# Patient Record
Sex: FEMALE | Race: WHITE
[De-identification: names, ages, dates, MRNs, and addresses within clinical notes are randomized per-mention and may not be internally consistent; named-entity substitution may affect disease eponyms.]

---

## 2017-08-29 ENCOUNTER — HOSPITAL ENCOUNTER (EMERGENCY)
Dept: HOSPITAL 22 - ER | Age: 47
Discharge: HOME | End: 2017-08-29
Payer: MEDICAID

## 2017-08-29 VITALS — HEIGHT: 70 IN | BODY MASS INDEX: 25.77 KG/M2 | WEIGHT: 180 LBS

## 2017-08-29 VITALS — DIASTOLIC BLOOD PRESSURE: 105 MMHG | SYSTOLIC BLOOD PRESSURE: 145 MMHG

## 2017-08-29 DIAGNOSIS — Z72.0: ICD-10-CM

## 2017-08-29 DIAGNOSIS — I10: ICD-10-CM

## 2017-08-29 DIAGNOSIS — L02.413: Primary | ICD-10-CM

## 2017-08-29 PROCEDURE — 0H9DXZZ DRAINAGE OF RIGHT LOWER ARM SKIN, EXTERNAL APPROACH: ICD-10-PCS | Performed by: EMERGENCY MEDICINE

## 2017-08-29 NOTE — EMERGENCY ROOM REPORT
History of Present Illness
Time Seen by MD Catherine
Presenting Problem in Triage
Pt arrived:Walked    
Presenting Problem:SWELLING AND REDNESS NOTED TO R POSTERIOR FOREARM. PT REPORTS
AREA NOTED APPROX 1 WEEK AGO, PT STATES WAS TAKING SOME AMOXICILLIN SHE HAD AT 
HOME, AREA WAS IMPROVING, THEN STATES HIT AREA ON A SURFACE 2 DAYS AGO, STATES 
AREA HAS SWELLED MORE, BECOME MORE PAINFUL, ALSO REPORTS N/V PT REPORTS AREA ON 
R ARM IS RESULT OF USING IV DRUGS 
Onset of symptoms date/time:17/ or onset unknown for:MEDICAL HX UNKNOWN
Treatment Prior to Arrival: AMOXICILLIN   PTA Provided by:SELF
 
Sepsis Risk Assessment: 
Temp: 98.9 B/P: 138/91 MAP: 106 Pulse: 86 Resp: 18
Recent fever? N Clinical Suspician of Infection? N 
Mental Status: 1 - Regular (Normal Baseline)   Sepsis Risk:Low Sepsis Risk 
 
Have you (or family members/close friends) recently traveled outside the [place]? N
If Yes, where/when: 
Have you had exposure to infectious disease within the past month? N TB? 
Other?  Specify: 
 
Comment
The patient has an abscess on her RIGHT forearm extensor surface for 5 days. 
Subjective fever. Nausea vomiting. The patient is an IV drug abuser. She has had
prior abscesses which usually come to a head on their own and resolve, but this 
one did not.
ALLERGIES
Coded Allergies:
codeine (17)
naproxen (From NAPROSYN) (12/01/15)
 
Home Medications
Reported Medications
No Known Home Medications
 
 
 
History
 
Medical History
General
   CAD? No
   Angina: No
   MI: No
   Hypertension? Yes
   Hyperlipidemia? No
   CHF? No
   DVT? No
   PE? No
   COPD? No
   Asthma? Yes
   Anemia? No
   GERD? No
   Gastric ulcers? No
   GI Bleed? No
   Hernia? No
   Thyroid Problems? No
   Hypothyroidism? No
   CVA? No
   Seizures? No
   Diabetes? No
   Renal Insuffiency? No
   End Stage Renal Disease? No
   UTI? No
   Stones? No
   BPH? No
   GB Disease: Yes
   Nephritic Syndrome? No
   Asplenia? No
   Hepatitis? Yes
   Sickle Cell Disease? No
   Arthritis? No
   Migraines? No
   Cataracts? No
   Glaucoma? No
   MRSA? No
   HIV? No
   TB? No
   Anxiety? No
   Depression? No
   Cancer? No
   More? Yes
   Additional hx:
IV DRUG USE
Immunization Hx
   DT/Tetanus UNKNOWN
   Flu REFUSES
   Pneumonia REFUSES
Surgical Hx
Previous Surgery?Y   X 1   Tubal Ligation   Gallbladd   
              
            
 
GYN Hx
   LMP N/A
 
Family History
Family Hx
   Diabetes No
   CAD Yes
   Hypertension Yes
   Hyperlipidemia Yes
   Cancer Yes
   TB No
 
Social History
Smoking Hx
   Smoker: Current Every Day Smoker
   Tobacco: Yes
   Type Cigarettes
   Packs/day 1 1/2 - 2 Packs
Alcohol
   Alcohol: No
 
Review of Systems
All Other Systems Reviewed and Negative
Constitutional
fever
Skin
see HPI
 
Physical Exam
Vital Signs
 Vital Signs
 
 
Date Time Temp Pulse Resp B/P Pulse O2 O2 Flow FiO2
 
     Ox Delivery Rate 
 
 1221 98.9 86 18 138/91 96   
 
 
General Appearance normal appearance
Respiratory Status
No: respiratory distress. 
Cardiovascular regular rate/rhythm, normal peripheral pulses
Extremities indurated abscess on the extensor surface of RIGHT forearm at about 
the mid shaft. Approximately 5 cm diameter with surrounding cellulitis 10 cm 
diameter.
Neurologic alert, no motor/sensory deficits
 
Medical Decision Making
 
LABS/Meds/Orders
Pt receiving controlled substance in ED? Yes
Arun was queried for this patient? Yes
Comment
[id number] 
 
0 rxs
Results/Orders
 Current Medication Orders
 
 
  Sig/Giulia Start time  Last
 
Medication Dose Route Stop Time Status Admin
 
Oxycodone/ 1 EACH ONCE ONE  1330 DCr 
 
Acetaminophen  PO  1331  
 
Clindamycin HCl 300 MG ONCE ONE  1300 DC 
 
  PO  1301  
 
Lidocaine/Epinephrine 10 ML ONCE ONE  1300 DC 
 
  SC  1301  
 
Lidocaine/Epinephrine 0 .STK-MED ONE  1259 DC 
 
  .ROUTE   
 
Sodium Chloride 10 ML PRN PRN  1245 AC 
 
  IV  1236  
 
 
 Orders
 
 
Procedure Date/time Status
 
CULTURE, WOUND  1330 Active
 
IV SALINE LOCK  1236 Active
 
CULTURE, BLOOD  1236 Active
 
LACTIC ACID  1236 Active
 
CBC WITH AUTO DIFF  1236 Active
 
CHEM 12 PROFILE  1236 Active
 
 
 
Progress
-
Nurses unable to obtain IV access.
 
Procedures
 
Incision and Drainage
Progress
Incision/Drainage
Performed by: CHIP RAMESH
 
Consent: Verbal consent obtained.
Risks and benefits: risks, benefits and alternatives were discussed
Consent given by: patient
Patient identity confirmed: verbally with patient
Type: abscess
Location: RIGHT forearm
Anesthesia: local infiltration
Local anesthetic: lidocaine 1% with epinephrine
Patient sedated: no
Scalpel size: 11
Incision type: single straight
Complexity: simple
Drainage: purulent plus blood
Drainage amount: Large amount
Wound treatment: probed for loculationsl. wound left open
Packin/4 inch
Culture:  Yes
Patient tolerance: Patient tolerated the procedure well with no immediate 
complications
 
 
 
Departure
 
Departure
Disposition DC Home or Self Care(routine)
Clinical Impression
Primary Impression: Cutaneous abscess
Qualifiers:  Site of cutaneous abscess: extremity Site of cutaneous abscess of 
extremity: upper extremity Laterality: right Qualified Code: L02.413 - Cutaneous
abscess of right upper limb
Condition STABLE
Patient Instructions DI for Incision and Drainage of a Skin Abscess
Additional Instructions
Additional instructions for ABSCESS:
Day one and two:  Remove the bandage and shower the area, leaving the packing in
place.  Gently blot dry.  Apply a bandage.
Day three: Follow-up with primary care physician, clinic, or Urgent Treatment 
Center for packing removal and culture results.
 
Return to the emergency department if increasing pain, swelling, redness, 
redstreaks or fever greater than 101 degrees.
 
 
Prescriptions
Current Visit Scripts
Clindamycin Hcl (Clindamycin 300MG*****) 300 MG PO QID 
     #40 CAP 
 
OXYCODONE HCL/ACETAMINOPHEN (Percocet 5-325 MG Tablet) 1 TAB PO Q6HP PRN pain
     #10 TAB 
 
 
ED Critical Care
   Critical Care No

## 2017-08-29 NOTE — EMERGENCY ROOM REPORT
History of Present Illness
Time Seen by MD Catherine
Presenting Problem in Triage
Pt arrived:Walked    
Presenting Problem:SWELLING AND REDNESS NOTED TO R POSTERIOR FOREARM. PT REPORTS
AREA NOTED APPROX 1 WEEK AGO, PT STATES WAS TAKING SOME AMOXICILLIN SHE HAD AT 
HOME, AREA WAS IMPROVING, THEN STATES HIT AREA ON A SURFACE 2 DAYS AGO, STATES 
AREA HAS SWELLED MORE, BECOME MORE PAINFUL, ALSO REPORTS N/V PT REPORTS AREA ON 
R ARM IS RESULT OF USING IV DRUGS 
Onset of symptoms date/time:17/ or onset unknown for:MEDICAL HX UNKNOWN
Treatment Prior to Arrival: AMOXICILLIN   PTA Provided by:SELF
 
Sepsis Risk Assessment: 
Temp: 98.9 B/P: 138/91 MAP: 106 Pulse: 86 Resp: 18
Recent fever? N Clinical Suspician of Infection? N 
Mental Status: 1 - Regular (Normal Baseline)   Sepsis Risk:Low Sepsis Risk 
 
Have you (or family members/close friends) recently traveled outside the [place]? N
If Yes, where/when: 
Have you had exposure to infectious disease within the past month? N TB? 
Other?  Specify: 
 
Comment
The patient has an abscess on her RIGHT forearm extensor surface for 5 days. 
Subjective fever. Nausea vomiting. The patient is an IV drug abuser. She has had
prior abscesses which usually come to a head on their own and resolve, but this 
one did not.
ALLERGIES
Coded Allergies:
codeine (17)
naproxen (From NAPROSYN) (12/01/15)
 
Home Medications
Reported Medications
No Known Home Medications
 
 
 
History
 
Medical History
General
   CAD? No
   Angina: No
   MI: No
   Hypertension? Yes
   Hyperlipidemia? No
   CHF? No
   DVT? No
   PE? No
   COPD? No
   Asthma? Yes
   Anemia? No
   GERD? No
   Gastric ulcers? No
   GI Bleed? No
   Hernia? No
   Thyroid Problems? No
   Hypothyroidism? No
   CVA? No
   Seizures? No
   Diabetes? No
   Renal Insuffiency? No
   End Stage Renal Disease? No
   UTI? No
   Stones? No
   BPH? No
   GB Disease: Yes
   Nephritic Syndrome? No
   Asplenia? No
   Hepatitis? Yes
   Sickle Cell Disease? No
   Arthritis? No
   Migraines? No
   Cataracts? No
   Glaucoma? No
   MRSA? No
   HIV? No
   TB? No
   Anxiety? No
   Depression? No
   Cancer? No
   More? Yes
   Additional hx:
IV DRUG USE
Immunization Hx
   DT/Tetanus UNKNOWN
   Flu REFUSES
   Pneumonia REFUSES
Surgical Hx
Previous Surgery?Y   X 1   Tubal Ligation   Gallbladd   
              
            
 
GYN Hx
   LMP N/A
 
Family History
Family Hx
   Diabetes No
   CAD Yes
   Hypertension Yes
   Hyperlipidemia Yes
   Cancer Yes
   TB No
 
Social History
Smoking Hx
   Smoker: Current Every Day Smoker
   Tobacco: Yes
   Type Cigarettes
   Packs/day 1 1/2 - 2 Packs
Alcohol
   Alcohol: No
 
Review of Systems
All Other Systems Reviewed and Negative
Constitutional
fever
Skin
see HPI
 
Physical Exam
Vital Signs
 Vital Signs
 
 
Date Time Temp Pulse Resp B/P Pulse O2 O2 Flow FiO2
 
     Ox Delivery Rate 
 
 1221 98.9 86 18 138/91 96   
 
 
General Appearance normal appearance
Respiratory Status
No: respiratory distress. 
Cardiovascular regular rate/rhythm, normal peripheral pulses
Extremities indurated abscess on the extensor surface of RIGHT forearm at about 
the mid shaft. Approximately 5 cm diameter with surrounding cellulitis 10 cm 
diameter.
Neurologic alert, no motor/sensory deficits
 
Medical Decision Making
 
LABS/Meds/Orders
Pt receiving controlled substance in ED? Yes
Arun was queried for this patient? Yes
Comment
[id number] 
 
0 rxs
Results/Orders
 Current Medication Orders
 
 
  Sig/Giulia Start time  Last
 
Medication Dose Route Stop Time Status Admin
 
Oxycodone/ 1 EACH ONCE ONE  1330 DCr 
 
Acetaminophen  PO  1331  
 
Clindamycin HCl 300 MG ONCE ONE  1300 DC 
 
  PO  1301  
 
Lidocaine/Epinephrine 10 ML ONCE ONE  1300 DC 
 
  SC  1301  
 
Lidocaine/Epinephrine 0 .STK-MED ONE  1259 DC 
 
  .ROUTE   
 
Sodium Chloride 10 ML PRN PRN  1245 AC 
 
  IV  1236  
 
 
 Orders
 
 
Procedure Date/time Status
 
CULTURE, WOUND  1330 Active
 
IV SALINE LOCK  1236 Active
 
CULTURE, BLOOD  1236 Active
 
LACTIC ACID  1236 Active
 
CBC WITH AUTO DIFF  1236 Active
 
CHEM 12 PROFILE  1236 Active
 
 
 
Progress
-
Nurses unable to obtain IV access.
 
Procedures
 
Incision and Drainage
Progress
Incision/Drainage
Performed by: CHIP RAMESH
 
Consent: Verbal consent obtained.
Risks and benefits: risks, benefits and alternatives were discussed
Consent given by: patient
Patient identity confirmed: verbally with patient
Type: abscess
Location: RIGHT forearm
Anesthesia: local infiltration
Local anesthetic: lidocaine 1% with epinephrine
Patient sedated: no
Scalpel size: 11
Incision type: single straight
Complexity: simple
Drainage: purulent plus blood
Drainage amount: Large amount
Wound treatment: probed for loculationsl. wound left open
Packin/4 inch
Culture:  Yes
Patient tolerance: Patient tolerated the procedure well with no immediate 
complications
 
 
 
Departure
 
Departure
Disposition DC Home or Self Care(routine)
Clinical Impression
Primary Impression: Cutaneous abscess
Qualifiers:  Site of cutaneous abscess: extremity Site of cutaneous abscess of 
extremity: upper extremity Laterality: right Qualified Code: L02.413 - Cutaneous
abscess of right upper limb
Condition STABLE
Patient Instructions DI for Incision and Drainage of a Skin Abscess
Additional Instructions
Additional instructions for ABSCESS:
Day one and two:  Remove the bandage and shower the area, leaving the packing in
place.  Gently blot dry.  Apply a bandage.
Day three: Follow-up with primary care physician, clinic, or Urgent Treatment 
Center for packing removal and culture results.
 
Return to the emergency department if increasing pain, swelling, redness, 
redstreaks or fever greater than 101 degrees.
 
 
Prescriptions
Current Visit Scripts
Clindamycin Hcl (Clindamycin 300MG*****) 300 MG PO QID 
     #40 CAP 
 
OXYCODONE HCL/ACETAMINOPHEN (Percocet 5-325 MG Tablet) 1 TAB PO Q6HP PRN pain
     #10 TAB 
 
 
ED Critical Care
   Critical Care No

## 2017-09-01 ENCOUNTER — HOSPITAL ENCOUNTER (INPATIENT)
Dept: HOSPITAL 22 - UTC | Age: 47
LOS: 3 days | Discharge: HOME | DRG: 603 | End: 2017-09-04
Attending: FAMILY MEDICINE | Admitting: FAMILY MEDICINE
Payer: MEDICAID

## 2017-09-01 VITALS — DIASTOLIC BLOOD PRESSURE: 104 MMHG | SYSTOLIC BLOOD PRESSURE: 187 MMHG

## 2017-09-01 VITALS — HEIGHT: 70 IN | WEIGHT: 185 LBS | BODY MASS INDEX: 26.48 KG/M2

## 2017-09-01 VITALS — SYSTOLIC BLOOD PRESSURE: 144 MMHG | DIASTOLIC BLOOD PRESSURE: 86 MMHG

## 2017-09-01 DIAGNOSIS — B95.62: ICD-10-CM

## 2017-09-01 DIAGNOSIS — L03.113: ICD-10-CM

## 2017-09-01 DIAGNOSIS — L02.818: Primary | ICD-10-CM

## 2017-09-01 LAB
BASOPHILS # BLD AUTO: 2.5 K/MM3 (ref 0.7–4.5)
BUN: 5 MG/DL (ref 7–18)
EOSINOPHIL NFR BLD AUTO: 31.7 % (ref 10–50)
GFR SERPLBLD BASED ON 1.73 SQ M-ARVRAT: 90 ML/MIN (ref 59–?)
HCT VFR BLD CALC: 12.6 G/DL (ref 12.2–16.2)

## 2017-09-01 NOTE — EMERGENCY ROOM REPORT
History of Present Illness
Time Seen by MD 
Presenting Problem in Triage
Pt arrived:Walked    
Presenting Problem:PT ADVISES SHE HAD AN ABCESS ON HER RT FOREARM THAT HAD AN I&
D PERFORMED ON WED AND THE PACKING HAS SINCE FALLEN OUT AND PT HAS HAD AN 
INCREASE IN PAIN, REDNESS, AND SWELLING AT THE SITE 
Onset of symptoms date/time:/ or onset unknown for:MEDICAL HX UNKNOWN
Treatment Prior to Arrival: SENT FROM [facility]   PTA Provided by:NURSE
 
Sepsis Risk Assessment: 
Temp: 98.1 B/P: 187/104 MAP: 131 Pulse: 84 Resp: 20
Recent fever? N Clinical Suspician of Infection? N 
Mental Status: 1 - Regular (Normal Baseline)   Sepsis Risk:Low Sepsis Risk 
 
Have you (or family members/close friends) recently traveled outside the [place]? N
If Yes, where/when: 
Have you had exposure to infectious disease within the past month? N TB? 
Other?  Specify: 
 
Source patient, RN notes reviewed, family, old records
Exam Limitations no limitations
Comment
pt with iv drug use and has swelling rt upper ext - pt was seen in the ed and 
had i/d and placed on abx as op but continued to have sx - pt was seen in [facility] 
and reffered to ed with progressive sx and finding - pt with dec po intake 
Cardiac Chest Pain
   Chest pain indicative of cardiac No
Timing/Duration this evening
Severity moderate
ALLERGIES
Coded Allergies:
codeine (17)
naproxen (From NAPROSYN) (12/01/15)
 
Home Medications
Active Scripts
Clindamycin Hcl (Clindamycin 300MG*****) 300 MG PO QID 
     #40 CAP 
     Prov:      17
OXYCODONE HCL/ACETAMINOPHEN (Percocet 5-325 MG Tablet) 1 TAB PO Q6HP PRN pain
     #10 TAB 
     Prov:      17
 
 
 
History
 
Medical History
General
   CAD? No
   Angina: No
   MI: No
   Hypertension? Yes
   Hyperlipidemia? No
   CHF? No
   DVT? No
   PE? No
   COPD? No
   Asthma? Yes
   Anemia? No
   GERD? No
   Gastric ulcers? No
   GI Bleed? No
   Hernia? No
   Thyroid Problems? No
   Hypothyroidism? No
   CVA? No
   Seizures? No
   Diabetes? No
   Renal Insuffiency? No
   End Stage Renal Disease? No
   UTI? No
   Stones? No
   BPH? No
   GB Disease: Yes
   Nephritic Syndrome? No
   Asplenia? No
   Hepatitis? Yes
   Sickle Cell Disease? No
   Arthritis? No
   Migraines? No
   Cataracts? No
   Glaucoma? No
   MRSA? No
   HIV? No
   TB? No
   Anxiety? No
   Depression? No
   Cancer? No
   More? Yes
   Additional hx:
IV DRUG USE
Immunization Hx
   DT/Tetanus UNKNOWN
   Flu REFUSES
   Pneumonia REFUSES
Surgical Hx
Previous Surgery?Y   X 1   Tubal Ligation   Gallbladd   
              
            
 
GYN Hx
   LMP N/A
 
Family History
Family Hx
   Diabetes No
   CAD Yes
   Hypertension Yes
   Hyperlipidemia Yes
   Cancer Yes
   TB No
 
Social History
Smoking Hx
   Smoker: Current Every Day Smoker
   Tobacco: Yes
   Type Cigarettes
   Packs/day 1 1/2 - 2 Packs
Alcohol
   Alcohol: No
Drugs none
 
Review of Systems
All Other Systems Reviewed and Negative
Constitutional
denies fever
Eyes
denies drainage
ENT
denies: ear pain, epistaxis, throat pain. 
Respiratory
denies cough, denies shortness of breath, denies wheezing
Cardiovascular
denies chest pain, denies palpitations, denies syncope
Gastrointestinal
see HPI, denies abdominal pain, nausea, denies vomiting
Genitourinary
denies: dysuria, frequency, hesitancy, hematuria. 
Musculoskeletal
denies back pain, denies joint pain, denies joint swelling, denies neck pain
Skin
denies rash
Psychiatric/Neurological
denies headache, denies seizure
 
Physical Exam
Vital Signs
 Vital Signs
 
 
Date Time Temp Pulse Resp B/P Pulse O2 O2 Flow FiO2
 
     Ox Delivery Rate 
 
 1837 98.1 84 20 187/104 98   
 
 1808 98.1 84 20 187/104 98   
 
 
-
WBC >12,000 or <4,000 or 10% bands? 
2 or more SIRS Criteria Met?    B/P:187/104 MAP:131
Creatinine >2.0?  UA output<0.5ml/kg/hr for 2 hrs? 
Platelet count >100,000?  Lactate >2.0mmol/1? 
INR >1.2 or PTT > than 60 sec?  Evidence of Organ Dysfunction? 
Provider documented clinical suspician of infection? N
Sepsis Criteria Count: 1  Sepsis Risk: Low Sepsis Risk 
 
General Appearance no apparent distress
Eye Exam
   -
   bilateral eye PERRL, bilateral eye EOMI
Ear, Nose, Throat normal ENT inspection
Neck supple
Respiratory Status
No: respiratory distress. 
Lung Sounds
bilateral: normal breath sounds. 
Cardiovascular regular rate/rhythm
Peripheral Pulses
   Pulses normal Yes
Extremities swelling, induration rt forearm with no abscess with neurovascular 
ok - no compartment syndrome
Strength
4 Upper Ext (L), 4 Upper Ext (R), 4 Lower Ext (L), 4 Lower Ext (R)
Neurologic alert, CNs II-XII nml as tested, no motor/sensory deficits
Reflexes
   Reflexes normal Yes
Mental status normal mood/affect
Skin changes rt forearm as noted 
 
Medical Decision Making
 
LABS/Meds/Orders
Pt receiving controlled substance in ED? No
Results/Orders
 Laboratory Tests
 
 
17:
Lactic Acid 1.4
 
17:
Sodium 135  L, Potassium 3.2  L, Chloride 97  L, Carbon Dioxide 32, BUN 5  L, 
Creatinine 0.7, Estimated Creat Clear 124, Estimated GFR (MDRD) 90, Glucose 137 
H, Calcium 9.0, Total Bilirubin 0.4, AST 19, ALT 20, Alkaline Phosphatase 147  H
, Total Protein 9.4  H, Albumin 3.1  L, Globulin 6.3  H, Albumin/Globulin Ratio 
0.5  L, WBC 7.8, RBC 4.10  L, Hgb 12.6, Hct 36.9  L, MCV 89.9, RDW 13.9, Plt 
Count 320, MPV 7.1  L, Gran % 58.4, Gran # 4.5, Lymphocytes % 31.7, Monocytes % 
6.2, Eosinophils % 3.4, Basophils % 0.4, Lymphocytes # 2.5, Monocytes # 0.5, 
Eosinophils # 0.3, Basophils # 0.0, PUBS MCHC 33.8, MCH 30.3
 Current Medication Orders
 
 
  Sig/Giulia Start time  Last
 
Medication Dose Route Stop Time Status Admin
 
Sodium Chloride 10 ML PRN PRN 1845 AC 
 
  IV 1833  
 
Sodium Chloride 1,000 ML .Q1H1M 1845 DC 
 
  IV  1945  185
 
Sodium Chloride 10 ML PRN PRN  184 AC 
 
  IV  1833  
 
 
 Orders
 
 
Procedure Date/time Status
 
Decision to admit 2005 Active
 
IV SALINE LOCK 1833 Active
 
CULTURE, BLOOD 1833 Active
 
LACTIC ACID 1833 Complete
 
CBC WITH AUTO DIFF 1833 Complete
 
CHEM 12 PROFILE 1833 Complete
 
 
 
Departure
 
Departure
Time of Disposition 
Disposition Still a Patient
Clinical Impression
Primary Impression: Cellulitis
Qualifiers:  Site of cellulitis: extremity Site of cellulitis of extremity: 
upper extremity Laterality: right Qualified Code: L03.113 - Cellulitis of right 
upper limb
Secondary Impressions: IV drug user
Condition STABLE
Referrals
Sachin FOSTER,Alvarado Piedra (Family)
 
ED Critical Care
   Critical Care No
 
Electronically Signed by IVETT Stephenson MD on 17 at 2022

## 2017-09-01 NOTE — EMERGENCY ROOM REPORT
History of Present Illness
Time Seen by MD 
Presenting Problem in Triage
Pt arrived:Walked    
Presenting Problem:PT ADVISES SHE HAD AN ABCESS ON HER RT FOREARM THAT HAD AN I&
D PERFORMED ON WED AND THE PACKING HAS SINCE FALLEN OUT AND PT HAS HAD AN 
INCREASE IN PAIN, REDNESS, AND SWELLING AT THE SITE 
Onset of symptoms date/time:/ or onset unknown for:MEDICAL HX UNKNOWN
Treatment Prior to Arrival: SENT FROM [facility]   PTA Provided by:NURSE
 
Sepsis Risk Assessment: 
Temp: 98.1 B/P: 187/104 MAP: 131 Pulse: 84 Resp: 20
Recent fever? N Clinical Suspician of Infection? N 
Mental Status: 1 - Regular (Normal Baseline)   Sepsis Risk:Low Sepsis Risk 
 
Have you (or family members/close friends) recently traveled outside the [place]? N
If Yes, where/when: 
Have you had exposure to infectious disease within the past month? N TB? 
Other?  Specify: 
 
Source patient, RN notes reviewed, family, old records
Exam Limitations no limitations
Comment
pt with iv drug use and has swelling rt upper ext - pt was seen in the ed and 
had i/d and placed on abx as op but continued to have sx - pt was seen in [facility] 
and reffered to ed with progressive sx and finding - pt with dec po intake 
Cardiac Chest Pain
   Chest pain indicative of cardiac No
Timing/Duration this evening
Severity moderate
ALLERGIES
Coded Allergies:
codeine (17)
naproxen (From NAPROSYN) (12/01/15)
 
Home Medications
Active Scripts
Clindamycin Hcl (Clindamycin 300MG*****) 300 MG PO QID 
     #40 CAP 
     Prov:      17
OXYCODONE HCL/ACETAMINOPHEN (Percocet 5-325 MG Tablet) 1 TAB PO Q6HP PRN pain
     #10 TAB 
     Prov:      17
 
 
 
History
 
Medical History
General
   CAD? No
   Angina: No
   MI: No
   Hypertension? Yes
   Hyperlipidemia? No
   CHF? No
   DVT? No
   PE? No
   COPD? No
   Asthma? Yes
   Anemia? No
   GERD? No
   Gastric ulcers? No
   GI Bleed? No
   Hernia? No
   Thyroid Problems? No
   Hypothyroidism? No
   CVA? No
   Seizures? No
   Diabetes? No
   Renal Insuffiency? No
   End Stage Renal Disease? No
   UTI? No
   Stones? No
   BPH? No
   GB Disease: Yes
   Nephritic Syndrome? No
   Asplenia? No
   Hepatitis? Yes
   Sickle Cell Disease? No
   Arthritis? No
   Migraines? No
   Cataracts? No
   Glaucoma? No
   MRSA? No
   HIV? No
   TB? No
   Anxiety? No
   Depression? No
   Cancer? No
   More? Yes
   Additional hx:
IV DRUG USE
Immunization Hx
   DT/Tetanus UNKNOWN
   Flu REFUSES
   Pneumonia REFUSES
Surgical Hx
Previous Surgery?Y   X 1   Tubal Ligation   Gallbladd   
              
            
 
GYN Hx
   LMP N/A
 
Family History
Family Hx
   Diabetes No
   CAD Yes
   Hypertension Yes
   Hyperlipidemia Yes
   Cancer Yes
   TB No
 
Social History
Smoking Hx
   Smoker: Current Every Day Smoker
   Tobacco: Yes
   Type Cigarettes
   Packs/day 1 1/2 - 2 Packs
Alcohol
   Alcohol: No
Drugs none
 
Review of Systems
All Other Systems Reviewed and Negative
Constitutional
denies fever
Eyes
denies drainage
ENT
denies: ear pain, epistaxis, throat pain. 
Respiratory
denies cough, denies shortness of breath, denies wheezing
Cardiovascular
denies chest pain, denies palpitations, denies syncope
Gastrointestinal
see HPI, denies abdominal pain, nausea, denies vomiting
Genitourinary
denies: dysuria, frequency, hesitancy, hematuria. 
Musculoskeletal
denies back pain, denies joint pain, denies joint swelling, denies neck pain
Skin
denies rash
Psychiatric/Neurological
denies headache, denies seizure
 
Physical Exam
Vital Signs
 Vital Signs
 
 
Date Time Temp Pulse Resp B/P Pulse O2 O2 Flow FiO2
 
     Ox Delivery Rate 
 
 1837 98.1 84 20 187/104 98   
 
 1808 98.1 84 20 187/104 98   
 
 
-
WBC >12,000 or <4,000 or 10% bands? 
2 or more SIRS Criteria Met?    B/P:187/104 MAP:131
Creatinine >2.0?  UA output<0.5ml/kg/hr for 2 hrs? 
Platelet count >100,000?  Lactate >2.0mmol/1? 
INR >1.2 or PTT > than 60 sec?  Evidence of Organ Dysfunction? 
Provider documented clinical suspician of infection? N
Sepsis Criteria Count: 1  Sepsis Risk: Low Sepsis Risk 
 
General Appearance no apparent distress
Eye Exam
   -
   bilateral eye PERRL, bilateral eye EOMI
Ear, Nose, Throat normal ENT inspection
Neck supple
Respiratory Status
No: respiratory distress. 
Lung Sounds
bilateral: normal breath sounds. 
Cardiovascular regular rate/rhythm
Peripheral Pulses
   Pulses normal Yes
Extremities swelling, induration rt forearm with no abscess with neurovascular 
ok - no compartment syndrome
Strength
4 Upper Ext (L), 4 Upper Ext (R), 4 Lower Ext (L), 4 Lower Ext (R)
Neurologic alert, CNs II-XII nml as tested, no motor/sensory deficits
Reflexes
   Reflexes normal Yes
Mental status normal mood/affect
Skin changes rt forearm as noted 
 
Medical Decision Making
 
LABS/Meds/Orders
Pt receiving controlled substance in ED? No
Results/Orders
 Laboratory Tests
 
 
17:
Lactic Acid 1.4
 
17:
Sodium 135  L, Potassium 3.2  L, Chloride 97  L, Carbon Dioxide 32, BUN 5  L, 
Creatinine 0.7, Estimated Creat Clear 124, Estimated GFR (MDRD) 90, Glucose 137 
H, Calcium 9.0, Total Bilirubin 0.4, AST 19, ALT 20, Alkaline Phosphatase 147  H
, Total Protein 9.4  H, Albumin 3.1  L, Globulin 6.3  H, Albumin/Globulin Ratio 
0.5  L, WBC 7.8, RBC 4.10  L, Hgb 12.6, Hct 36.9  L, MCV 89.9, RDW 13.9, Plt 
Count 320, MPV 7.1  L, Gran % 58.4, Gran # 4.5, Lymphocytes % 31.7, Monocytes % 
6.2, Eosinophils % 3.4, Basophils % 0.4, Lymphocytes # 2.5, Monocytes # 0.5, 
Eosinophils # 0.3, Basophils # 0.0, PUBS MCHC 33.8, MCH 30.3
 Current Medication Orders
 
 
  Sig/Giulia Start time  Last
 
Medication Dose Route Stop Time Status Admin
 
Sodium Chloride 10 ML PRN PRN 1845 AC 
 
  IV 1833  
 
Sodium Chloride 1,000 ML .Q1H1M 1845 DC 
 
  IV  1945  185
 
Sodium Chloride 10 ML PRN PRN  184 AC 
 
  IV  1833  
 
 
 Orders
 
 
Procedure Date/time Status
 
Decision to admit 2005 Active
 
IV SALINE LOCK 1833 Active
 
CULTURE, BLOOD 1833 Active
 
LACTIC ACID 1833 Complete
 
CBC WITH AUTO DIFF 1833 Complete
 
CHEM 12 PROFILE 1833 Complete
 
 
 
Departure
 
Departure
Time of Disposition 
Disposition Still a Patient
Clinical Impression
Primary Impression: Cellulitis
Qualifiers:  Site of cellulitis: extremity Site of cellulitis of extremity: 
upper extremity Laterality: right Qualified Code: L03.113 - Cellulitis of right 
upper limb
Secondary Impressions: IV drug user
Condition STABLE
Referrals
Sachin FOSTER,Alvarado Piedra (Family)
 
ED Critical Care
   Critical Care No
 
Electronically Signed by IVETT Stephenson MD on 17 at 2022

## 2017-09-02 VITALS — SYSTOLIC BLOOD PRESSURE: 119 MMHG | DIASTOLIC BLOOD PRESSURE: 64 MMHG

## 2017-09-02 VITALS — DIASTOLIC BLOOD PRESSURE: 80 MMHG | SYSTOLIC BLOOD PRESSURE: 153 MMHG

## 2017-09-02 VITALS — SYSTOLIC BLOOD PRESSURE: 159 MMHG | DIASTOLIC BLOOD PRESSURE: 90 MMHG

## 2017-09-02 VITALS — DIASTOLIC BLOOD PRESSURE: 90 MMHG | SYSTOLIC BLOOD PRESSURE: 159 MMHG

## 2017-09-02 VITALS — SYSTOLIC BLOOD PRESSURE: 178 MMHG | DIASTOLIC BLOOD PRESSURE: 90 MMHG

## 2017-09-02 VITALS — DIASTOLIC BLOOD PRESSURE: 99 MMHG | SYSTOLIC BLOOD PRESSURE: 185 MMHG

## 2017-09-02 VITALS — SYSTOLIC BLOOD PRESSURE: 168 MMHG | DIASTOLIC BLOOD PRESSURE: 96 MMHG

## 2017-09-02 VITALS — DIASTOLIC BLOOD PRESSURE: 112 MMHG | SYSTOLIC BLOOD PRESSURE: 199 MMHG

## 2017-09-02 VITALS — DIASTOLIC BLOOD PRESSURE: 95 MMHG | SYSTOLIC BLOOD PRESSURE: 192 MMHG

## 2017-09-02 VITALS — SYSTOLIC BLOOD PRESSURE: 134 MMHG | DIASTOLIC BLOOD PRESSURE: 75 MMHG

## 2017-09-02 VITALS — SYSTOLIC BLOOD PRESSURE: 178 MMHG | DIASTOLIC BLOOD PRESSURE: 84 MMHG

## 2017-09-02 VITALS — DIASTOLIC BLOOD PRESSURE: 72 MMHG | SYSTOLIC BLOOD PRESSURE: 141 MMHG

## 2017-09-02 VITALS — DIASTOLIC BLOOD PRESSURE: 77 MMHG | SYSTOLIC BLOOD PRESSURE: 139 MMHG

## 2017-09-02 VITALS — SYSTOLIC BLOOD PRESSURE: 154 MMHG | DIASTOLIC BLOOD PRESSURE: 74 MMHG

## 2017-09-02 VITALS — SYSTOLIC BLOOD PRESSURE: 141 MMHG | DIASTOLIC BLOOD PRESSURE: 72 MMHG

## 2017-09-02 VITALS — SYSTOLIC BLOOD PRESSURE: 153 MMHG | DIASTOLIC BLOOD PRESSURE: 86 MMHG

## 2017-09-02 VITALS — SYSTOLIC BLOOD PRESSURE: 188 MMHG | DIASTOLIC BLOOD PRESSURE: 92 MMHG

## 2017-09-02 LAB
BASOPHILS # BLD AUTO: 1.9 K/MM3 (ref 0.7–4.5)
BUN: 4 MG/DL (ref 7–18)
EOSINOPHIL NFR BLD AUTO: 33 % (ref 10–50)
GFR SERPLBLD BASED ON 1.73 SQ M-ARVRAT: 107 ML/MIN (ref 59–?)
HCT VFR BLD CALC: 10.9 G/DL (ref 12.2–16.2)

## 2017-09-02 PROCEDURE — 0H9BXZZ DRAINAGE OF RIGHT UPPER ARM SKIN, EXTERNAL APPROACH: ICD-10-PCS | Performed by: SURGERY

## 2017-09-02 NOTE — ANESTHESIA RECORD
Anesthesia Record Part I
Total IV fluids: 500
EBL (ml): 10
Urine Output: 0
B/P: 139/57
% SaO2: 98
Pulse: 75
Resps: 16
Temp: 97.8
Patient is: Awake, Stable
Stable to PACU at: 1050
 
Electronically Signed by Duke Manjarrez CRNA on 09/02/17 at 1056

## 2017-09-02 NOTE — HISTORY AND PHYSICAL REPORT
Demographics:
Admit date: 17
Chief complaint:
swollen upper ext 
PRIMARY DIAGNOSIS: CELLULITIS
Allergies:
Coded Allergies:
codeine (17)
naproxen (From NAPROSYN) (12/01/15)
 
 
History of present illness:
History of present illness:
this wf who admits to iv drug use has progressive swelling and tenderness rt 
forearm- she was seen a couple of days ago in the ed and had i/d and po abx 
which showed mrsa- she has continued sx and progressive swelling and pain and 
returned to ed for failing op treatment- pt was admitted for iv abx and pain 
meds with surg consult -
 
Past medical history:
Family HX
   Family Hx Insignificant Yes
Immunization HX
   DT/Tetanus Unknown
   Flu REFUSES
   Pneumonia Never Had
   TB Test in last year No
General
   CAD? No
   Angina: No
   MI: No
   Hypertension? Yes
   Hyperlipidemia? No
   CHF? No
   DVT? No
   PE? No
   COPD? No
   Asthma? Yes
   Anemia? No
   GERD? No
   Gastric ulcers? No
   GI Bleed? No
   Hernia? No
   Thyroid Problems? No
   Hypothyroidism? No
   CVA? No
   Seizures? No
   Diabetes? No
   Renal Insuffiency? No
   UTI? No
   Stones? No
   BPH? No
   GB Disease: Yes
   Nephritic Syndrome? No
   Asplenia? No
   Hepatitis? Yes
   Sickle Cell Disease? No
   Arthritis? No
   Migraines? No
   Cataracts? No
   Glaucoma? No
   MRSA? No
   HIV? No
   TB? No
   Anxiety? No
   Depression? No
   Cancer? No
   More? Yes
   Additional hx:
IV DRUG USE
Past Surgical HX
Previous Surgery?Y   X 1   Tubal Ligation   Gallbladd   
              
            
 
Current home meds:
Active Scripts
Clindamycin Hcl (Clindamycin 300MG*****) 300 MG PO QID 
     #40 CAP 
     Prov:      17
OXYCODONE HCL/ACETAMINOPHEN (Percocet 5-325 MG Tablet) 1 TAB PO Q6HP PRN pain
     #10 TAB 
     Prov:      17
 
 
 
Social Hx:
Smoking HX
   Tobacco Yes
   Type Cigarettes
   Packs/day 1 1/2 - 2 PACKS
   Are you/the child exposed to second-hand smoke: Yes
Alcohol
   Alcohol: No
Hx of Drug Use
   Drug Use? Yes
   Drug(s) of Choice: heroin
Patien't marital status is 
Patient's support system is good
 
Review of systems:
Constitutional
see HPI, fever, weakness. 
Eyes
No: blurred vision. 
Ears, Nose, Mouth, Throat
No ear discharge, No epistaxis, No throat pain
Respiratory
No: cough, shortness of breath, wheezing. 
Cardiovascular
No chest pain, No palpitations, No syncope
Gastrointestinal/Abdominal
No diarrhea, No vomiting
Genitourinary
No: dysuria, frequency, hesitancy, hematuria. 
Musculoskeletal
No: back pain, neck pain. 
Skin
see HPI, other.  No: rash. 
Neurological
No: headache, seizure disorder. 
Psychiatric
No: no symptoms reported. 
 
Exam:
Lab data for last 24 hours:
Laboratory Tests
 
 
17:
Lactic Acid 1.4, ESR >120  H
 
17:
Sodium 135  L, Potassium 3.2  L, Chloride 97  L, Carbon Dioxide 32, BUN 5  L, 
Creatinine 0.7, Estimated Creat Clear 124, Estimated GFR (MDRD) 90, Glucose 137 
H, Calcium 9.0, Total Bilirubin 0.4, AST 19, ALT 20, Alkaline Phosphatase 147  H
, Total Protein 9.4  H, Albumin 3.1  L, Globulin 6.3  H, Albumin/Globulin Ratio 
0.5  L, WBC 7.8, RBC 4.10  L, Hgb 12.6, Hct 36.9  L, MCV 89.9, RDW 13.9, Plt 
Count 320, MPV 7.1  L, Gran % 58.4, Gran # 4.5, Lymphocytes % 31.7, Monocytes % 
6.2, Eosinophils % 3.4, Basophils % 0.4, Lymphocytes # 2.5, Monocytes # 0.5, 
Eosinophils # 0.3, Basophils # 0.0, PUBS MCHC 33.8, MCH 30.3
Microbiology
1840  BLOOD: Anaerobic Blood Culture - RECD
1840  BLOOD: Aerobic Blood Culture - RECD
1840  BLOOD: Anaerobic Blood Culture - RECD
1840  BLOOD: Aerobic Blood Culture - RECD
 
 
Admission vital signs:
1ST Vital Signs
 
 
 Result Date Time
 
Pulse Ox 98 1808
 
B/P 187/104 1808
 
Temp 98.1 1808
 
Pulse 84 1808
 
Resp 20 1808
 
O2 Delivery ROOM AIR 2055
 
 
 
Exam
   General appearance: alert, awake
   Eyes: anicteric, PERRLA
   ENT: dry mucous membranes
   Neck: no carotid bruit
   Cardiovascular: regular rate & rhythm, no murmur, no rub
   Respiratory: clear to auscultation
   ABD: soft
   Genitourinary: no hematuria
   Extremities: swollen rt forearm with reddness and induration with 
neurovascular ok 
   Musculoskeletal: equal muscle strength
   Skin: see above 
   Neuro: alert, CNs II-XII nml as tested
Additional information:
will admit  for iv abx and surg consult as pt failed out pt
 
Plan:
Problem List
 1. IV drug user
 
 2. Cellulitis
 
 3. Tobacco use disorder
 
 4. MRSA cellulitis
 
Plan:
will see if i/d needed 
 
Electronically Signed by Sachin FOSTER,Alvarado Piedra on 17 at 3331

## 2017-09-02 NOTE — CONSULT NOTE
Pharmacokinetic Consult
Date of consult: 09/02/17
Time of consult: 0849
Referring provider:
DR. ROMERO
Reason for consult:
VANCOMYCIN DOSING
Allergies:
Coded Allergies:
codeine (08/29/17)
naproxen (From NAPROSYN) (12/01/15)
 
Home Medications:
Active Scripts
Clindamycin Hcl (Clindamycin 300MG*****) 300 MG PO QID 
     #40 CAP 
     Prov:      08/29/17
OXYCODONE HCL/ACETAMINOPHEN (Percocet 5-325 MG Tablet) 1 TAB PO Q6HP PRN pain
     #10 TAB 
     Prov:      08/29/17
 
 
Height (feet): 5
Height (inches): 10.00
Medical History:
   CAD? No
   Angina: No
   MI: No
   Hypertension? Yes
   Hyperlipidemia? No
   CHF? No
   DVT? No
   PE? No
   COPD? No
   Asthma? Yes
   Anemia? No
   GERD? No
   Gastric ulcers? No
   GI Bleed? No
   Hernia? No
   Thyroid Problems? No
   Hypothyroidism? No
   CVA? No
   Seizures? No
   Diabetes? No
   Renal Insuffiency? No
   UTI? No
   Stones? No
   BPH? No
   GB Disease: Yes
   Nephritic Syndrome? No
   Asplenia? No
   Hepatitis? Yes
   Sickle Cell Disease? No
   Arthritis? No
   Migraines? No
   Cataracts? No
   Glaucoma? No
   MRSA? No
   HIV? No
   TB? No
   Anxiety? No
   Depression? No
   Cancer? No
   More? Yes
   Additional hx:
IV DRUG USE
Labs:
Laboratory Tests
 
 
09/02/17 0625:
Sodium 139, Potassium 4.0, Chloride 101, Carbon Dioxide 28, BUN 4  L, Creatinine
0.6, Estimated Creat Clear 154, Estimated GFR (MDRD) 107, Glucose 84, Calcium 
8.4  L, WBC 5.8, RBC 3.57  L, Hgb 10.9  L, Hct 32.0  L, MCV 89.5, RDW 14.2, Plt 
Count 261, MPV 8.3, Gran % 53.2, Gran # 3.1, Lymphocytes % 33.0, Monocytes % 9.8
 H, Eosinophils % 3.6, Basophils % 0.3, Lymphocytes # 1.9, Monocytes # 0.6, 
Eosinophils # 0.2, Basophils # 0.0, PUBS MCHC 33.7, MCH 30.2
 
09/01/17 1840:
Lactic Acid 1.4, ESR >120  H
 
09/01/17 1840:
Sodium 135  L, Potassium 3.2  L, Chloride 97  L, Carbon Dioxide 32, BUN 5  L, 
Creatinine 0.7, Estimated Creat Clear 124, Estimated GFR (MDRD) 90, Glucose 137 
H, Calcium 9.0, Total Bilirubin 0.4, AST 19, ALT 20, Alkaline Phosphatase 147  H
, Total Protein 9.4  H, Albumin 3.1  L, Globulin 6.3  H, Albumin/Globulin Ratio 
0.5  L, WBC 7.8, RBC 4.10  L, Hgb 12.6, Hct 36.9  L, MCV 89.9, RDW 13.9, Plt 
Count 320, MPV 7.1  L, Gran % 58.4, Gran # 4.5, Lymphocytes % 31.7, Monocytes % 
6.2, Eosinophils % 3.4, Basophils % 0.4, Lymphocytes # 2.5, Monocytes # 0.5, 
Eosinophils # 0.3, Basophils # 0.0, PUBS MCHC 33.8, MCH 30.3
Microbiology
09/01 1840  BLOOD: Anaerobic Blood Culture - RECD
09/01 1840  BLOOD: Aerobic Blood Culture - RECD
09/01 1840  BLOOD: Anaerobic Blood Culture - RECD
09/01 1840  BLOOD: Aerobic Blood Culture - RECD
 
 
Problem List:
 1. Cellulitis
 
Plan:
BASED ON PATIENT FACTORS, RECOMMEND VANCOMYCIN 1250 MG IV Q8H. WILL OBTAIN 
VANCOMYCIN TROUGH LEVEL PRIOR TO 4TH DOSE. PHARMACY WILL FOLLOW DAILY AND ADJUST
AS APPROPRIATE.
 
Electronically Signed by Ronda Hardin on 09/02/17 at 0850

## 2017-09-02 NOTE — ANESTHESIA RECORD
Anesthesia Record Part II
Discharge time: 1120
Destination: Second Floor
PACU nurse assessment review? Yes
Patient is: Stable
Anesthesia complications? No
 
Electronically Signed by Duke Manjarrez CRNA on 09/02/17 at 1050

## 2017-09-02 NOTE — PHARMACY CLINIC NOTE
Patient Demographics
 
Patient Demographics
Admission date:
17
 
Date: 17
Time: 1106
Allergies
Coded Allergies:
codeine (17)
naproxen (From NAPROSYN) (12/01/15)
 
HEIGHT- FT: 5
IN: 10.00
[id number]
 
 
VTE General Information
Labs:
Laboratory Tests
 
 
 
 
 0625 1840
 
Hematology  
 
  Hgb (12.2 - 16.2 g/dL) 10.9  L 12.6
 
  Hct (37.0 - 47.0 %) 32.0  L 36.9  L
 
  Plt Count (142 - 424 K/mm3) 261 320
 
 
 
Disclaimer
The following section includes nursing documentation that has been pulled in for
pharmacy review.
 
Patient's VTE score: 1
Patient's VTE Risk: VERY LOW RISK
Clinical trial participant? No
 
VTE prophylaxis
NQF 0371
   VTE prophylaxis ordered? Yes
Type of prophylaxis/treatment: EVELINE
 
Electronically Signed by Ronda Hardin on 17 at 1106

## 2017-09-02 NOTE — CONSULT NOTE
Standard Demographics
 
Patient Demo
Date of Consultation: 17
Referring Provider: Walter Stephenson MD
Reason for Consultation: abscess
PRIMARY DIAGNOSIS: CELLULITIS
Allergies:
Coded Allergies:
codeine (17)
naproxen (From NAPROSYN) (12/01/15)
 
 
History of Present Illness
Chief Complaint:
Abscess
History of Present Illness:
This is a 46-year-old female seen in consultation from Dr. Stephenson for evaluation
regarding a RIGHT upper extremity abscess. She states that her "a month or so 
ago" she had a small injury when working on fence. She had initial resolution of
symptoms; however, over the past "week or so" she has had increasing pain and 
redness along the RIGHT forearm. She was diagnosed with abscess and underwent 
incision and drainage in the emergency department. She initially improved at the
site of drainage; however, she has now developed increasing redness and swelling
in the area adjacent to the incision and drainage site. No fevers.
Past Medical History
Reports: hypertension, asthma. 
Surgical History
Previous Surgery?Y   X 1   Tubal Ligation   Gallbladd   
              
            
 
Allergies
Coded Allergies:
codeine (17)
naproxen (From NAPROSYN) (12/01/15)
 
Medications:
Active Scripts
Clindamycin Hcl (Clindamycin 300MG*****) 300 MG PO QID 
     #40 CAP 
     Prov:      17
OXYCODONE HCL/ACETAMINOPHEN (Percocet 5-325 MG Tablet) 1 TAB PO Q6HP PRN pain
     #10 TAB 
     Prov:      17
 
 
Family history
Postive for: HTN. 
Smoking Hx
   Tobacco: Yes
   Smoker: Current Every Day Smoker
   Type: Cigarettes
   Packs/day: 1 1/2 - 2 Packs
   Are you/the child exposed to second-hand smoke: Yes
Alcohol
   Alcohol: No
Hx of Drug Use
   Drug Use? Yes
   Drug(s) of Choice: .
 
Review of Systems
Constitutional
No: recent weight loss. 
Skin
No: bruising. 
Immune/allergy
No: anaphalaxis. 
Eyes
No: discharge. 
ENT
No: nose bleed. 
Respiratory
No: pneumonia. 
Cardiovascular
No: palpitations. 
GI
No: nausea, vomitting. 
 (female)
No: hematuria. 
Heme
No: petechia. 
Endocrine
No: polydipsia. 
Neurological
No: change in LOC. 
Psychiatric
No: anxious. 
 
Physical Exam
VS/I&O
Vital Signs
 
 
Date Time Temp Pulse Resp B/P Pulse O2 O2 Flow FiO2
 
     Ox Delivery Rate 
 
 0354 98.1 62 18 119/64 94 ROOM AIR  
 
 0300   18     
 
 2303   18     
 
 211 98.4 64 18 144/86 94 ROOM AIR  
 
 2055  64      
 
2055 98.4 64 18 144/86    
 
2055     94 ROOM AIR  
 
 204 98.1 64 20 153/109 98   
 
 204 98.1 64 20 153/109 98   
 
2039  64 20 153/109 98   
 
 1837 98.1 84 20 187/104 98   
 
 1808 98.1 84 20 187/104 98   
 
 
I&O
 
 
  0700
 
Intake Total 1091
 
Output Total 
 
Balance 1091
 
  
 
Intake, IV 1091
 
Patient 83.916 kg
 
Weight 
 
 
 
Exam
   General appearance no acute distress
   Respiratory no distress
   Cardiovascular regular rate and rhythm
   Abdomen soft
   Skin abcess location (Right forearm), abcess condition (+ cellulitis), abcess
size (15cm)
 
Plan
Plan:
Impression:
RIGHT forearm abscess
 
 
Plan:
Incision and drainage of RIGHT forearm abscess-I have discussed the risks and 
benefits and she agrees to proceed
 
Electronically Signed by TOBI VALERO MD on 17 at 0814

## 2017-09-02 NOTE — ANESTHESIA RECORD
Anesthesia Record Part II
Discharge time: 1120
Destination: Second Floor
PACU nurse assessment review? Yes
Patient is: Stable
Anesthesia complications? No
 
Electronically Signed by Duke Manjarrez CRNA on 09/02/17 at 1054

## 2017-09-02 NOTE — HISTORY AND PHYSICAL REPORT
Demographics:
Admit date: 17
Chief complaint:
swollen upper ext 
PRIMARY DIAGNOSIS: CELLULITIS
Allergies:
Coded Allergies:
codeine (17)
naproxen (From NAPROSYN) (12/01/15)
 
 
History of present illness:
History of present illness:
this wf who admits to iv drug use has progressive swelling and tenderness rt 
forearm- she was seen a couple of days ago in the ed and had i/d and po abx 
which showed mrsa- she has continued sx and progressive swelling and pain and 
returned to ed for failing op treatment- pt was admitted for iv abx and pain 
meds with surg consult -
 
Past medical history:
Family HX
   Family Hx Insignificant Yes
Immunization HX
   DT/Tetanus Unknown
   Flu REFUSES
   Pneumonia Never Had
   TB Test in last year No
General
   CAD? No
   Angina: No
   MI: No
   Hypertension? Yes
   Hyperlipidemia? No
   CHF? No
   DVT? No
   PE? No
   COPD? No
   Asthma? Yes
   Anemia? No
   GERD? No
   Gastric ulcers? No
   GI Bleed? No
   Hernia? No
   Thyroid Problems? No
   Hypothyroidism? No
   CVA? No
   Seizures? No
   Diabetes? No
   Renal Insuffiency? No
   UTI? No
   Stones? No
   BPH? No
   GB Disease: Yes
   Nephritic Syndrome? No
   Asplenia? No
   Hepatitis? Yes
   Sickle Cell Disease? No
   Arthritis? No
   Migraines? No
   Cataracts? No
   Glaucoma? No
   MRSA? No
   HIV? No
   TB? No
   Anxiety? No
   Depression? No
   Cancer? No
   More? Yes
   Additional hx:
IV DRUG USE
Past Surgical HX
Previous Surgery?Y   X 1   Tubal Ligation   Gallbladd   
              
            
 
Current home meds:
Active Scripts
Clindamycin Hcl (Clindamycin 300MG*****) 300 MG PO QID 
     #40 CAP 
     Prov:      17
OXYCODONE HCL/ACETAMINOPHEN (Percocet 5-325 MG Tablet) 1 TAB PO Q6HP PRN pain
     #10 TAB 
     Prov:      17
 
 
 
Social Hx:
Smoking HX
   Tobacco Yes
   Type Cigarettes
   Packs/day 1 1/2 - 2 PACKS
   Are you/the child exposed to second-hand smoke: Yes
Alcohol
   Alcohol: No
Hx of Drug Use
   Drug Use? Yes
   Drug(s) of Choice: heroin
Patien't marital status is 
Patient's support system is good
 
Review of systems:
Constitutional
see HPI, fever, weakness. 
Eyes
No: blurred vision. 
Ears, Nose, Mouth, Throat
No ear discharge, No epistaxis, No throat pain
Respiratory
No: cough, shortness of breath, wheezing. 
Cardiovascular
No chest pain, No palpitations, No syncope
Gastrointestinal/Abdominal
No diarrhea, No vomiting
Genitourinary
No: dysuria, frequency, hesitancy, hematuria. 
Musculoskeletal
No: back pain, neck pain. 
Skin
see HPI, other.  No: rash. 
Neurological
No: headache, seizure disorder. 
Psychiatric
No: no symptoms reported. 
 
Exam:
Lab data for last 24 hours:
Laboratory Tests
 
 
17:
Lactic Acid 1.4, ESR >120  H
 
17:
Sodium 135  L, Potassium 3.2  L, Chloride 97  L, Carbon Dioxide 32, BUN 5  L, 
Creatinine 0.7, Estimated Creat Clear 124, Estimated GFR (MDRD) 90, Glucose 137 
H, Calcium 9.0, Total Bilirubin 0.4, AST 19, ALT 20, Alkaline Phosphatase 147  H
, Total Protein 9.4  H, Albumin 3.1  L, Globulin 6.3  H, Albumin/Globulin Ratio 
0.5  L, WBC 7.8, RBC 4.10  L, Hgb 12.6, Hct 36.9  L, MCV 89.9, RDW 13.9, Plt 
Count 320, MPV 7.1  L, Gran % 58.4, Gran # 4.5, Lymphocytes % 31.7, Monocytes % 
6.2, Eosinophils % 3.4, Basophils % 0.4, Lymphocytes # 2.5, Monocytes # 0.5, 
Eosinophils # 0.3, Basophils # 0.0, PUBS MCHC 33.8, MCH 30.3
Microbiology
1840  BLOOD: Anaerobic Blood Culture - RECD
1840  BLOOD: Aerobic Blood Culture - RECD
1840  BLOOD: Anaerobic Blood Culture - RECD
1840  BLOOD: Aerobic Blood Culture - RECD
 
 
Admission vital signs:
1ST Vital Signs
 
 
 Result Date Time
 
Pulse Ox 98 1808
 
B/P 187/104 1808
 
Temp 98.1 1808
 
Pulse 84 1808
 
Resp 20 1808
 
O2 Delivery ROOM AIR 2055
 
 
 
Exam
   General appearance: alert, awake
   Eyes: anicteric, PERRLA
   ENT: dry mucous membranes
   Neck: no carotid bruit
   Cardiovascular: regular rate & rhythm, no murmur, no rub
   Respiratory: clear to auscultation
   ABD: soft
   Genitourinary: no hematuria
   Extremities: swollen rt forearm with reddness and induration with 
neurovascular ok 
   Musculoskeletal: equal muscle strength
   Skin: see above 
   Neuro: alert, CNs II-XII nml as tested
Additional information:
will admit  for iv abx and surg consult as pt failed out pt
 
Plan:
Problem List
 1. IV drug user
 
 2. Cellulitis
 
 3. Tobacco use disorder
 
 4. MRSA cellulitis
 
Plan:
will see if i/d needed 
 
Electronically Signed by Sachin FOSTER,Alvarado Piedra on 17 at 8959

## 2017-09-03 VITALS — DIASTOLIC BLOOD PRESSURE: 81 MMHG | SYSTOLIC BLOOD PRESSURE: 149 MMHG

## 2017-09-03 VITALS — SYSTOLIC BLOOD PRESSURE: 149 MMHG | DIASTOLIC BLOOD PRESSURE: 81 MMHG

## 2017-09-03 VITALS — DIASTOLIC BLOOD PRESSURE: 88 MMHG | SYSTOLIC BLOOD PRESSURE: 153 MMHG

## 2017-09-03 VITALS — DIASTOLIC BLOOD PRESSURE: 100 MMHG | SYSTOLIC BLOOD PRESSURE: 171 MMHG

## 2017-09-03 VITALS — DIASTOLIC BLOOD PRESSURE: 81 MMHG | SYSTOLIC BLOOD PRESSURE: 151 MMHG

## 2017-09-03 NOTE — ACUTE CARE PROGRESS NOTE (QUA)
Progress Notes
 
Subjective
Date 17
Time 1100
Note
Pt had I&D of right forearm yesterday.  States it is still very sore, but 
feeling somewhat better.
Patient/family reports: feeling better, pain
 
Objective
Findings
Last VS-Temp:98.1 B/P:151/81  Pulse:62 Resp:18 SaO2:98    ROOM AIR 
Last weight lbs:185 oz:0 K.916 Method:Floor Scales 
 
 
Exam
   General appearance: alert, awake
   Cardiovascular: regular rate & rhythm
   Respiratory: normal exam
   Extremities: right arm is bandaged but no streaking 
   Skin: normal exam
   Neuro: alert, oriented
Reviewed: allergies, medications, vital signs
 
Assessment/Plan
Problem List
 1. IV drug user
 
 2. Cellulitis
 
 3. Tobacco use disorder
 
 4. MRSA cellulitis
 
Patient condition Stable
Plan: continue current care
This inpt stay is expected to cross 2 MNs from start of care Yes
 
Electronically Signed by Alvarado Stephenson MD on 17 at 1348

## 2017-09-03 NOTE — POST-OP PROGRESS NOTE
Post Op Subjective Data
Patient is post-op day 1
Subjective data:
Feels "OK"
 
Post op objective data
Vitals,I&O,and Labs:
Vital signs, intake and output,and available lab data for the last 24 hours is 
as noted below.
Vital Signs
 
 
Date Time Temp Pulse Resp B/P Pulse O2 O2 Flow FiO2
 
     Ox Delivery Rate 
 
09/03 0759 98.1 62 18 151/81 98   
 
09/03 0758   18     
 
09/03 0723 98.1 62 18 151/81 98 ROOM AIR  
 
09/03 0616   16     
 
09/03 0404   16     
 
09/03 0347 98.7 53 16 153/88 96 ROOM AIR  
 
09/03 0144   16     
 
09/02 2357   16     
 
09/02 2340 97.6 62 16 153/86 97 ROOM AIR  
 
09/02 2159   16     
 
09/02 2134   16     
 
09/02 2124 98.7 63 16 154/74 97   
 
09/02 2105   16     
 
09/02 2006 98.7 63 16 154/74 97 ROOM AIR  
 
09/02 1831  65 18 159/90 96   
 
09/02 1828  00 00 00/00 00   
 
09/02 1820 98.5 60 18 134/75 98   
 
09/02 1809 97.8       
 
09/02 1720 98.5 68 18 168/96 97   
 
09/02 1717   18     
 
09/02 1620 98.6 67 18 153/80 97   
 
09/02 1606   20     
 
09/02 1520 98.6 82 18 139/77 93   
 
09/02 1449   20     
 
09/02 1420 98.8 61 20 141/72 92   
 
09/02 1350 98.4 61 20 154/95 95   
 
09/02 1329   20     
 
09/02 1320 98.6 66 18 178/90 97   
 
09/02 1250 98.7 58 20 178/84 99   
 
09/02 1222   20     
 
09/02 1220 98.6 61 20 192/95 99   
 
09/02 1205 97.4 72 20 188/92 97   
 
09/02 1150 98.3 74 20 185/99 98   
 
09/02 1141   20     
 
09/02 1135 98.1 81 20 199/112 97   
 
09/02 1135 98.1 81 20 199/112 97   
 
09/02 1121   20     
 
09/02 1120 98.5 64 16 147/73 98 ROOM AIR  
 
09/02 1116   20     
 
09/02 1110 98.4 68 16 145/63 98 ROOM AIR  
 
09/02 1108   22     
 
09/02 1100 98.1 66 16 141/60 98 ROOM AIR  
 
09/02 1056 97.8 75 16 139/57 98   
 
09/02 1050 97.8 75 16 139/57 98 ROOM AIR  
 
09/02 0928   18     
 
 
 
 
 09/02 1500 09/02 2300 09/03 0700
 
Intake Total 
 
Output Total  0 
 
Balance 
 
    
 
Intake, IV  50 2110
 
Intake, Oral 280 240 480
 
Intake, Tube  0 
 
Irrigant   
 
Output,  0 
 
Emesis   
 
Output,  0 
 
Estimated   
 
Blood Loss   
 
Output, Other  0 
 
Output, Urine  0 
 
Patient 83.916 kg  
 
Weight   
 
 
Laboratory Tests
 
 
Test Result Date Time
 
Chemistry  
 
  Sodium (mmoL/L) 139 09/02 0625
 
  Potassium (mmoL/L) 4.0 09/02 0625
 
  Chloride (mmoL/L) 101 09/02 0625
 
  Carbon Dioxide (mmoL/L) 28 09/02 0625
 
  BUN (mg/dL) 4 09/02 0625
 
  Creatinine (mg/dL) 0.6 09/02 0625
 
  Estimated Creat Clear (ML/MIN) 154 09/02 0625
 
  Estimated GFR (MDRD) (ML/MIN) 107 09/02 0625
 
  Glucose (mg/dL) 84 09/02 0625
 
  Lactic Acid (mmol/L) 1.4 09/01 1840
 
  Calcium (mg/dL) 8.4 09/02 0625
 
  Total Bilirubin (mg/dL) 0.4 09/01 1840
 
  AST (U/L) 19 09/01 1840
 
  ALT (U/L) 20 09/01 1840
 
  Alkaline Phosphatase (U/L) 147 09/01 1840
 
  Total Protein (gm/dL) 9.4 09/01 1840
 
  Albumin (gm/dL) 3.1 09/01 1840
 
  Globulin (gm/dL) 6.3 09/01 1840
 
  Albumin/Globulin Ratio 0.5 09/01 1840
 
Hematology  
 
  WBC (K/MM3) 5.8 09/02 0625
 
  RBC (M/mm3) 3.57 09/02 0625
 
  Hgb (g/dL) 10.9 09/02 0625
 
  Hct (%) 32.0 09/02 0625
 
  MCV (fl) 89.5 09/02 0625
 
  RDW (%) 14.2 09/02 0625
 
  Plt Count (K/mm3) 261 09/02 0625
 
  MPV (fl) 8.3 09/02 0625
 
  Gran % (%) 53.2 09/02 0625
 
  Gran # (K/mm3) 3.1 09/02 0625
 
  Lymphocytes % (%) 33.0 09/02 0625
 
  Monocytes % (%) 9.8 09/02 0625
 
  Eosinophils % (%) 3.6 09/02 0625
 
  Basophils % (%) 0.3 09/02 0625
 
  Lymphocytes # (K/mm3) 1.9 09/02 0625
 
  Monocytes # (K/mm3) 0.6 09/02 0625
 
  Eosinophils # (K/mm3) 0.2 09/02 0625
 
  Basophils # (K/MM3) 0.0 09/02 0625
 
  PUBS MCHC (g/dl) 33.7 09/02 0625
 
  ESR (mm/hr) >120 09/01 1840
 
Immunology  
 
  MCH (pg) 30.2 09/02 0625
 
 
 
 
Physical Exam
VS/I&O
Vital Signs
 
 
Date Time Temp Pulse Resp B/P Pulse O2 O2 Flow FiO2
 
     Ox Delivery Rate 
 
09/03 0759 98.1 62 18 151/81 98   
 
09/03 0758   18     
 
09/03 0723 98.1 62 18 151/81 98 ROOM AIR  
 
09/03 0616   16     
 
09/03 0404   16     
 
09/03 0347 98.7 53 16 153/88 96 ROOM AIR  
 
09/03 0144   16     
 
09/02 2357   16     
 
09/02 2340 97.6 62 16 153/86 97 ROOM AIR  
 
09/02 2159   16     
 
09/02 2134   16     
 
09/02 2124 98.7 63 16 154/74 97   
 
09/02 2105   16     
 
09/02 2006 98.7 63 16 154/74 97 ROOM AIR  
 
09/02 1831  65 18 159/90 96   
 
09/02 1828  00 00 00/00 00   
 
09/02 1820 98.5 60 18 134/75 98   
 
09/02 1809 97.8       
 
09/02 1720 98.5 68 18 168/96 97   
 
09/02 1717   18     
 
09/02 1620 98.6 67 18 153/80 97   
 
09/02 1606   20     
 
09/02 1520 98.6 82 18 139/77 93   
 
09/02 1449   20     
 
09/02 1420 98.8 61 20 141/72 92   
 
09/02 1350 98.4 61 20 154/95 95   
 
09/02 1329   20     
 
09/02 1320 98.6 66 18 178/90 97   
 
09/02 1250 98.7 58 20 178/84 99   
 
09/02 1222   20     
 
09/02 1220 98.6 61 20 192/95 99   
 
09/02 1205 97.4 72 20 188/92 97   
 
09/02 1150 98.3 74 20 185/99 98   
 
09/02 1141   20     
 
09/02 1135 98.1 81 20 199/112 97   
 
09/02 1135 98.1 81 20 199/112 97   
 
09/02 1121   20     
 
09/02 1120 98.5 64 16 147/73 98 ROOM AIR  
 
09/02 1116   20     
 
09/02 1110 98.4 68 16 145/63 98 ROOM AIR  
 
09/02 1108   22     
 
09/02 1100 98.1 66 16 141/60 98 ROOM AIR  
 
09/02 1056 97.8 75 16 139/57 98   
 
09/02 1050 97.8 75 16 139/57 98 ROOM AIR  
 
09/02 0928   18     
 
 
I&O
 
 
 09/03 0700
 
Intake Total 3160
 
Output Total 0
 
Balance 3160
 
  
 
Intake, IV 2160
 
Intake, Oral 1000
 
Intake, Tube 0
 
Irrigant 
 
Output, 0
 
Emesis 
 
Output, 0
 
Estimated 
 
Blood Loss 
 
Output, Other 0
 
Output, Urine 0
 
Patient 83.916 kg
 
Weight 
 
 
 
Exam
   General appearance no acute distress
   Respiratory no distress
   Cardiovascular regular rate and rhythm
   Skin abcess location (packed.no spreading cellulitis), abcess condition
 
Post op patient plan
Diagnoses:
RIGHT upper extremity abscess-overall, doing fairly well status post extensive 
incision and drainage
Plan: Antibiotics
This inpt stay is expected to cross 2 MNs from start of care Yes
Additional data:
Twice-daily dressing changes
 
Electronically Signed by TOBI VALERO MD on 09/03/17 at 0831

## 2017-09-03 NOTE — ACUTE CARE PROGRESS NOTE (QUA)
Progress Notes
 
Subjective
Date 17
Time 1100
Note
Pt had I&D of right forearm yesterday.  States it is still very sore, but 
feeling somewhat better.
Patient/family reports: feeling better, pain
 
Objective
Findings
Last VS-Temp:98.1 B/P:151/81  Pulse:62 Resp:18 SaO2:98    ROOM AIR 
Last weight lbs:185 oz:0 K.916 Method:Floor Scales 
 
 
Exam
   General appearance: alert, awake
   Cardiovascular: regular rate & rhythm
   Respiratory: normal exam
   Extremities: right arm is bandaged but no streaking 
   Skin: normal exam
   Neuro: alert, oriented
Reviewed: allergies, medications, vital signs
 
Assessment/Plan
Problem List
 1. IV drug user
 
 2. Cellulitis
 
 3. Tobacco use disorder
 
 4. MRSA cellulitis
 
Patient condition Stable
Plan: continue current care
This inpt stay is expected to cross 2 MNs from start of care Yes
 
Electronically Signed by Alvarado Stephenson MD on 17 at 1340

## 2017-09-03 NOTE — CONSULT NOTE
Pharmacokinetic Consult
Date of consult: 09/03/17
Time of consult: 8812
Referring provider:
DR. ROMERO
Reason for consult:
VANCOMYCIN TROUGH LEVEL
Allergies:
Coded Allergies:
codeine (09/02/17)
naproxen (From NAPROSYN) (09/02/17)
 
Home Medications:
Active Scripts
Clindamycin Hcl (Clindamycin 300MG*****) 300 MG PO QID 
     #40 CAP 
     Prov:      08/29/17
OXYCODONE HCL/ACETAMINOPHEN (Percocet 5-325 MG Tablet) 1 TAB PO Q6HP PRN PAIN
     #10 TAB 
     Prov:      08/29/17
 
 
Height (feet): 5
Height (inches): 10.00
Medical History:
   CAD? No
   Angina: No
   MI: No
   Hypertension? Yes
   Hyperlipidemia? No
   CHF? No
   DVT? No
   PE? No
   COPD? No
   Asthma? Yes
   Anemia? No
   GERD? No
   Gastric ulcers? No
   GI Bleed? No
   Hernia? No
   Thyroid Problems? No
   Hypothyroidism? No
   CVA? No
   Seizures? No
   Diabetes? No
   Renal Insuffiency? No
   UTI? No
   Stones? No
   BPH? No
   GB Disease: Yes
   Nephritic Syndrome? No
   Asplenia? No
   Hepatitis? Yes
   Sickle Cell Disease? No
   Arthritis? No
   Migraines? No
   Cataracts? No
   Glaucoma? No
   MRSA? No
   HIV? No
   TB? No
   Anxiety? No
   Depression? No
   Cancer? No
   More? Yes
   Additional hx:
IV DRUG USE
Labs:
Laboratory Tests
 
 
09/03/17 1245:
Vancomycin Trough 11.8
 
Problem List:
 1. MRSA cellulitis
 
Plan:
BASED ON VANCOMYCIN TROUGH LEVEL, RECOMMEND CONTINUING VANCOMYCIN 1250 MG IV 
Q8H. PHARMACY WILL CONTINUE TO MONITOR DAILY AND ADJUST AS APPROPRIATE.
 
Electronically Signed by Ronda Hardin on 09/03/17 at 2196

## 2017-09-03 NOTE — CONSULT NOTE
Pharmacokinetic Consult
Date of consult: 09/03/17
Time of consult: 6322
Referring provider:
DR. ROMERO
Reason for consult:
VANCOMYCIN TROUGH LEVEL
Allergies:
Coded Allergies:
codeine (09/02/17)
naproxen (From NAPROSYN) (09/02/17)
 
Home Medications:
Active Scripts
Clindamycin Hcl (Clindamycin 300MG*****) 300 MG PO QID 
     #40 CAP 
     Prov:      08/29/17
OXYCODONE HCL/ACETAMINOPHEN (Percocet 5-325 MG Tablet) 1 TAB PO Q6HP PRN PAIN
     #10 TAB 
     Prov:      08/29/17
 
 
Height (feet): 5
Height (inches): 10.00
Medical History:
   CAD? No
   Angina: No
   MI: No
   Hypertension? Yes
   Hyperlipidemia? No
   CHF? No
   DVT? No
   PE? No
   COPD? No
   Asthma? Yes
   Anemia? No
   GERD? No
   Gastric ulcers? No
   GI Bleed? No
   Hernia? No
   Thyroid Problems? No
   Hypothyroidism? No
   CVA? No
   Seizures? No
   Diabetes? No
   Renal Insuffiency? No
   UTI? No
   Stones? No
   BPH? No
   GB Disease: Yes
   Nephritic Syndrome? No
   Asplenia? No
   Hepatitis? Yes
   Sickle Cell Disease? No
   Arthritis? No
   Migraines? No
   Cataracts? No
   Glaucoma? No
   MRSA? No
   HIV? No
   TB? No
   Anxiety? No
   Depression? No
   Cancer? No
   More? Yes
   Additional hx:
IV DRUG USE
Labs:
Laboratory Tests
 
 
09/03/17 1245:
Vancomycin Trough 11.8
 
Problem List:
 1. MRSA cellulitis
 
Plan:
BASED ON VANCOMYCIN TROUGH LEVEL, RECOMMEND CONTINUING VANCOMYCIN 1250 MG IV 
Q8H. PHARMACY WILL CONTINUE TO MONITOR DAILY AND ADJUST AS APPROPRIATE.
 
Electronically Signed by Ronda Hardin on 09/03/17 at 4349

## 2017-09-04 VITALS — SYSTOLIC BLOOD PRESSURE: 167 MMHG | DIASTOLIC BLOOD PRESSURE: 87 MMHG

## 2017-09-04 VITALS — DIASTOLIC BLOOD PRESSURE: 98 MMHG | SYSTOLIC BLOOD PRESSURE: 165 MMHG

## 2017-09-04 NOTE — ACUTE CARE PROGRESS NOTE (QUA)
Progress Notes
 
Subjective
Date 17
Time 0732
Note
doing better
Patient/family reports: feeling better
Nursing reports: no complaints
 
Objective
Findings
Last VS-Temp:98.0 B/P:167/87  Pulse:56 Resp:18 SaO2:98    ROOM AIR 
Last weight lbs:185 oz:0 K.916 Method:Floor Scales 
 
 
Exam
   General appearance: alert, awake
   Eyes: anicteric, PERRLA
   ENT: dry mucous membranes
   Neck: no JVD
   Cardiovascular: regular rate & rhythm
   Respiratory: no respiratory distress
   ABD: soft
   Genitourinary: normal voiding & quantity
   Extremities: dressing rt upper ext 
   Musculoskeletal: equal muscle strength
   Skin: dressing rt upper ext 
   Neuro: alert, CNs II-XII nml as tested
Reviewed: allergies, medications, vital signs, lab results, consult note
 
Assessment/Plan
Problem List
 1. IV drug user
 
 2. Cellulitis
 
 3. Tobacco use disorder
 
 4. MRSA cellulitis
 
Patient condition Improving
Plan: initiate discharge plan
This inpt stay is expected to cross 2 MNs from start of care Yes
Comments:
discussed with surg and will d/c today 
 
Electronically Signed by Alvarado Stephenson MD on 17 at 0773

## 2017-09-04 NOTE — DISCHARGE SUMMARY STANDARD
Demographics
Admit date: 09/01/17
Discharge date: 09/04/17
 
History of present illness
History of present illness
this wf who admits to iv drug use has progressive swelling and tenderness rt 
forearm- she was seen a couple of days ago in the ed and had i/d and po abx 
which showed mrsa- she has continued sx and progressive swelling and pain and 
returned to ed for failing op treatment- pt was admitted for iv abx and pain 
meds with surg consult -
 
Hospital Course
Hospital Course:
pt has did better with ivf fluids and abx and surg i/d and will be released to 
be followed as op by pcp and surg -s is a 46-year-old female seen in 
consultation from Dr. Stephenson for evaluation regarding a RIGHT upper extremity 
abscess. She states that her "a month or so ago" she had a small injury when 
working on fence. She had initial resolution of symptoms; however, over the past
"week or so" she has had increasing pain and redness along the RIGHT forearm. 
She was diagnosed with abscess and underwent incision and drainage in the 
emergency department. She initially improved at the site of drainage; however, 
she has now developed increasing redness and swelling in the area adjacent to 
the incision and drainage site. No fevers.ision and drainage of complex RIGHT 
upper extremity abscess (complex 15 cm subcutaneous pocket with small punctate 
collections)
Indications:
BERYL MANN is a 46 year-old Female with a history RIGHT upper extremity abscess 
status post bedside incision and drainage. She continued to progress in terms of
pain, swelling, and redness and was admitted for further evaluation and 
management.
 
 
Discharge diagnoses
Problem List
 1. IV drug user
 
 2. Cellulitis
 
 3. Tobacco use disorder
 
 4. MRSA cellulitis
 
 5. Abscess of forearm, right
 
 
Medications
Medications:
Discharge meds are as noted.
 
Comment:
will follow as op closely with surg
 
Follow up
Follow up in office in: 2 DAYS
with:
Fryman APRN, Eugonda
 
Electronically Signed by Sachin FOSTER,Alvarado Piedra on 09/04/17 at 0788

## 2017-09-04 NOTE — POST-OP PROGRESS NOTE
Post Op Subjective Data
Patient is post-op day 2
Subjective data:
No new complaints.
 
Post op objective data
Vitals,I&O,and Labs:
Vital signs, intake and output,and available lab data for the last 24 hours is 
as noted below.
Vital Signs
 
 
Date Time Temp Pulse Resp B/P Pulse O2 O2 Flow FiO2
 
     Ox Delivery Rate 
 
09/04 0842 98.0 56 18 167/87 98   
 
09/04 0720 98.0 56 18 167/87 98 ROOM AIR  
 
09/04 0627   18     
 
09/04 0400 98.0 50 18 165/98 99 ROOM AIR  
 
09/04 0123   18     
 
09/03 2132 98.9 61 18 149/81 97   
 
09/03 2011   18     
 
09/03 1940 98.9 61 18 149/81 97 ROOM AIR  
 
09/03 1611   18     
 
09/03 1515 98.5 70 18 171/100 99 ROOM AIR  
 
09/03 1307   18     
 
09/03 1048   18     
 
09/03 0934   18     
 
 
 
 
 09/03 1500 09/03 2300 09/04 0700
 
Intake Total 960  
 
Output Total 0 0 
 
Balance 960 0 
 
    
 
Intake, Oral 960  
 
Output, Urine 0 0 
 
 
Laboratory Tests
 
 
Test Result Date Time
 
Chemistry  
 
  Sodium (mmoL/L) 139 09/02 0625
 
  Potassium (mmoL/L) 4.0 09/02 0625
 
  Chloride (mmoL/L) 101 09/02 0625
 
  Carbon Dioxide (mmoL/L) 28 09/02 0625
 
  BUN (mg/dL) 4 09/02 0625
 
  Creatinine (mg/dL) 0.6 09/02 0625
 
  Estimated Creat Clear (ML/MIN) 154 09/02 0625
 
  Estimated GFR (MDRD) (ML/MIN) 107 09/02 0625
 
  Glucose (mg/dL) 84 09/02 0625
 
  Lactic Acid (mmol/L) 1.4 09/01 1840
 
  Calcium (mg/dL) 8.4 09/02 0625
 
  Total Bilirubin (mg/dL) 0.4 09/01 1840
 
  AST (U/L) 19 09/01 1840
 
  ALT (U/L) 20 09/01 1840
 
  Alkaline Phosphatase (U/L) 147 09/01 1840
 
  Total Protein (gm/dL) 9.4 09/01 1840
 
  Albumin (gm/dL) 3.1 09/01 1840
 
  Globulin (gm/dL) 6.3 09/01 1840
 
  Albumin/Globulin Ratio 0.5 09/01 1840
 
Hematology  
 
  WBC (K/MM3) 5.8 09/02 0625
 
  RBC (M/mm3) 3.57 09/02 0625
 
  Hgb (g/dL) 10.9 09/02 0625
 
  Hct (%) 32.0 09/02 0625
 
  MCV (fl) 89.5 09/02 0625
 
  RDW (%) 14.2 09/02 0625
 
  Plt Count (K/mm3) 261 09/02 0625
 
  MPV (fl) 8.3 09/02 0625
 
  Gran % (%) 53.2 09/02 0625
 
  Gran # (K/mm3) 3.1 09/02 0625
 
  Lymphocytes % (%) 33.0 09/02 0625
 
  Monocytes % (%) 9.8 09/02 0625
 
  Eosinophils % (%) 3.6 09/02 0625
 
  Basophils % (%) 0.3 09/02 0625
 
  Lymphocytes # (K/mm3) 1.9 09/02 0625
 
  Monocytes # (K/mm3) 0.6 09/02 0625
 
  Eosinophils # (K/mm3) 0.2 09/02 0625
 
  Basophils # (K/MM3) 0.0 09/02 0625
 
  PUBS MCHC (g/dl) 33.7 09/02 0625
 
  ESR (mm/hr) >120 09/01 1840
 
Immunology  
 
  MCH (pg) 30.2 09/02 0625
 
Toxicology  
 
  Vancomycin Trough (mcg/mL) 11.8 09/03 1245
 
 
 
 
Physical Exam
VS/I&O
Vital Signs
 
 
Date Time Temp Pulse Resp B/P Pulse O2 O2 Flow FiO2
 
     Ox Delivery Rate 
 
09/04 0842 98.0 56 18 167/87 98   
 
09/04 0720 98.0 56 18 167/87 98 ROOM AIR  
 
09/04 0627   18     
 
09/04 0400 98.0 50 18 165/98 99 ROOM AIR  
 
09/04 0123   18     
 
09/03 2132 98.9 61 18 149/81 97   
 
09/03 2011   18     
 
09/03 1940 98.9 61 18 149/81 97 ROOM AIR  
 
09/03 1611   18     
 
09/03 1515 98.5 70 18 171/100 99 ROOM AIR  
 
09/03 1307   18     
 
09/03 1048   18     
 
09/03 0934   18     
 
 
I&O
 
 
 09/04 0700
 
Intake Total 960
 
Output Total 0
 
Balance 960
 
  
 
Intake, Oral 960
 
Output, Urine 0
 
 
 
Exam
   General appearance no acute distress
   Respiratory no distress
   Cardiovascular regular rate and rhythm
   Extremities moves all (no spreading cellulitis)
 
Post op patient plan
Diagnoses:
RUE abscess with cellulitis-overall, doing very well status post incision and 
drainage
Plan: (see below)
This inpt stay is expected to cross 2 MNs from start of care Yes
Additional data:
Likely discharge home today as per primary service with close outpatient follow-
up. She will continue by mouth antibiotics.
 
Electronically Signed by TOBI VALERO MD on 09/04/17 at 0908

## 2017-09-04 NOTE — DISCHARGE SUMMARY STANDARD
Demographics
Admit date: 09/01/17
Discharge date: 09/04/17
 
History of present illness
History of present illness
this wf who admits to iv drug use has progressive swelling and tenderness rt 
forearm- she was seen a couple of days ago in the ed and had i/d and po abx 
which showed mrsa- she has continued sx and progressive swelling and pain and 
returned to ed for failing op treatment- pt was admitted for iv abx and pain 
meds with surg consult -
 
Hospital Course
Hospital Course:
pt has did better with ivf fluids and abx and surg i/d and will be released to 
be followed as op by pcp and surg -s is a 46-year-old female seen in 
consultation from Dr. Stephenson for evaluation regarding a RIGHT upper extremity 
abscess. She states that her "a month or so ago" she had a small injury when 
working on fence. She had initial resolution of symptoms; however, over the past
"week or so" she has had increasing pain and redness along the RIGHT forearm. 
She was diagnosed with abscess and underwent incision and drainage in the 
emergency department. She initially improved at the site of drainage; however, 
she has now developed increasing redness and swelling in the area adjacent to 
the incision and drainage site. No fevers.ision and drainage of complex RIGHT 
upper extremity abscess (complex 15 cm subcutaneous pocket with small punctate 
collections)
Indications:
BERYL MANN is a 46 year-old Female with a history RIGHT upper extremity abscess 
status post bedside incision and drainage. She continued to progress in terms of
pain, swelling, and redness and was admitted for further evaluation and 
management.
 
 
Discharge diagnoses
Problem List
 1. IV drug user
 
 2. Cellulitis
 
 3. Tobacco use disorder
 
 4. MRSA cellulitis
 
 5. Abscess of forearm, right
 
 
Medications
Medications:
Discharge meds are as noted.
 
Comment:
will follow as op closely with surg
 
Follow up
Follow up in office in: 2 DAYS
with:
Fryman APRN, Eugonda
 
Electronically Signed by Sachin FOSTER,Alvarado Piedra on 09/04/17 at 0762

## 2017-09-04 NOTE — ACUTE CARE PROGRESS NOTE (QUA)
Progress Notes
 
Subjective
Date 17
Time 0732
Note
doing better
Patient/family reports: feeling better
Nursing reports: no complaints
 
Objective
Findings
Last VS-Temp:98.0 B/P:167/87  Pulse:56 Resp:18 SaO2:98    ROOM AIR 
Last weight lbs:185 oz:0 K.916 Method:Floor Scales 
 
 
Exam
   General appearance: alert, awake
   Eyes: anicteric, PERRLA
   ENT: dry mucous membranes
   Neck: no JVD
   Cardiovascular: regular rate & rhythm
   Respiratory: no respiratory distress
   ABD: soft
   Genitourinary: normal voiding & quantity
   Extremities: dressing rt upper ext 
   Musculoskeletal: equal muscle strength
   Skin: dressing rt upper ext 
   Neuro: alert, CNs II-XII nml as tested
Reviewed: allergies, medications, vital signs, lab results, consult note
 
Assessment/Plan
Problem List
 1. IV drug user
 
 2. Cellulitis
 
 3. Tobacco use disorder
 
 4. MRSA cellulitis
 
Patient condition Improving
Plan: initiate discharge plan
This inpt stay is expected to cross 2 MNs from start of care Yes
Comments:
discussed with surg and will d/c today 
 
Electronically Signed by Alvarado Stephenson MD on 17 at 0733

## 2017-10-07 NOTE — EXTERNAL MEDICAL SUMMARY RPT
[facility] Patient Summary
 Created on: 10/04/2017
 
BERYL MANN
External Reference #: [id number]
: 68
Sex: Female
 
Demographics
 
 
 
 Address  [address]  [address]
 
 Home Phone  [phone]
 
 Preferred Language  Unknown
 
 Marital Status  Unknown
 
 Spiritism Affiliation  Unknown
 
 Race  Unknown
 
 Ethnic Group  Unknown
 
 
Author
 
 
 
 Author  [facility]IR Andrade, [employer] 
 
 Organization  [employer]
 
 Address  Unknown
 
 Phone  Unavailable
 
 
 
Results
 
 
 
 Vancomycin [Mass/volume] in Serum or Plasma --trough
 
 
 
 
 Observa  Value  Referen  Units  Interpr  Notes  Date
 
 tion   ce    etation  
 
   Range    
 
 
 
 
 Vancomyci  10.0 -   mcg/mL  Normal  No   Sep 3 
 
 n   20.0    informati  2017 
 
 [Mass/vol     on in   12:45 PM
 
 ume] in      source  
 
 Serum or      data 
 
 Plasma      
 
 --trough     
 
 
 
 
 Choriogonadotropin.beta subunit [Units] in 24 hour Urine
 
 
 
 
 Observa  Value  Referen  Units  Interpr  Notes  Date
 
 tion   ce    etation  
 
   Range    
 
 
 
 
 Choriogon  NEG  No   No   No   Sep 2 
 
 adotropin   informati  informati  informati  2017 
 
 .beta    on in   on in   on in   10:00 AM
 
 subunit    source   source   source  
 
 [Units]    data  data  data 
 
 in 24      
 
 hour      
 
 Urine     
 
 
 
 
 Basic metabolic panel in Blood
 
 
 
 
 Observa  Value  Referen  Units  Interpr  Notes  Date
 
 tion   ce    etation  
 
   Range    
 
 
 
 
 Urea   7 - 18  mg/dL  Low  No   Sep 2 
 
 nitrogen      informati  2017 6:25
 
 [Mass/vol     on in    AM
 
 ume] in      source  
 
 Serum or      data 
 
 Plasma     
 
 Calcium   8.5 -   mg/dL  Low  No   Sep 2 
 
 [Mass/vol  10.1    informati  2017 6:25
 
 ume] in      on in    AM
 
 Serum or      source  
 
 Plasma     data 
 
 Chloride   98 - 107  mmoL/L  Normal  No   Sep 2 
 
 [Moles/vo     informati  2017 6:25
 
 lume] in      on in    AM
 
 Serum or      source  
 
 Plasma     data 
 
 Carbon   21.0 -   mmoL/L  Normal  No   Sep 2 
 
 dioxide,   32.0    informati  2017 6:25
 
 total      on in    AM
 
 [Moles/vo     source  
 
 lume] in      data 
 
 Serum or      
 
 Plasma     
 
 Creatinin  0.55 -   mg/dL  Normal  No   Sep 2 
 
 e   1.02    informati  2017 6:25
 
 [Mass/vol     on in    AM
 
 ume] in      source  
 
 Serum or      data 
 
 Plasma     
 
 Creatinin  50 - 200  ML/MIN  Normal  No   Sep 2 
 
 e renal      informati  2017 6:25
 
 clearance     on in    AM
 
       source  
 
 predicted     data 
 
  by      
 
 Cockcroft     
 
 -Gault      
 
 formula     
 
 Estimated  59-  ML/MIN  No   REFERENCE  Sep 2 
 
      informati   RANGE:   2017 6:25
 
 glomerula    on in   >60    AM
 
 r     source   ML/MIN/1. 
 
 filtratio    data  73 SQUARE 
 
 n rate       METERSIf 
 
 (GF      this  
 
     patient  
 
     is  
 
     -A 
 
     merican,  
 
     then  
 
     multiply  
 
     theresult 
 
      by  
 
     1.210. 
 
 Glucose   74 - 106  mg/dL  Normal  No   Sep 2 
 
 [Mass/vol     informati  2017 6:25
 
 ume] in      on in    AM
 
 Serum or      source  
 
 Plasma     data 
 
 Potassium  3.5 - 5.1  mmoL/L  Normal  No   Sep 2 
 
       informati  2017 6:25
 
 [Moles/vo     on in    AM
 
 lume] in      source  
 
 Serum or      data 
 
 Plasma     
 
 Sodium   136 - 145  mmoL/L  Normal  No   Sep 2 
 
 [Moles/vo     informati  2017 6:25
 
 lume] in      on in    AM
 
 Serum or      source  
 
 Plasma     data 
 
 
 
 
 CBC W Auto Differential panel in Blood
 
 
 
 
 Observa  Value  Referen  Units  Interpr  Notes  Date
 
 tion   ce    etation  
 
   Range    
 
 
 
 
 Basophils  0 - 0.2  K/MM3  Normal  No   Sep 2 
 
       informati  2017 6:25
 
 [#/volume     on in    AM
 
 ] in      source  
 
 Blood by      data 
 
 Automated     
 
  count     
 
 Basophils  0.1 - 2.0  %  Normal  No   Sep 2 
 
 /100      informati  2017 6:25
 
 leukocyte     on in    AM
 
 s in      source  
 
 Blood by      data 
 
 Automated     
 
  count     
 
 Eosinophi  0.0 - 0.4  K/mm3  Normal  No   Sep 2 
 
 ls      informati  2017 6:25
 
 [#/volume     on in    AM
 
 ] in      source  
 
 Blood by      data 
 
 Automated     
 
  count     
 
 Eosinophi  0.1 -   %  Normal  No   Sep 2 
 
 ls/100   12.0    informati  2017 6:25
 
 leukocyte     on in    AM
 
 s in      source  
 
 Blood by      data 
 
 Automated     
 
  count     
 
 Granulocy  1.8 - 7.8  K/mm3  Normal  No   Sep 2 
 
 karen      informati  2017 6:25
 
 [#/volume     on in    AM
 
 ] in      source  
 
 Blood by      data 
 
 Automated     
 
  count     
 
 Granulocy  37.0 -   %  Normal  No   Sep 2 
 
 karen/100   80.0    informati  2017 6:25
 
 leukocyte     on in    AM
 
 s in      source  
 
 Blood by      data 
 
 Automated     
 
  count     
 
 Hematocri  37.0 -   %  Low  No   Sep 2 
 
 t [Volume  47.0    informati  2017 6:25
 
       on in    AM
 
 Fraction]     source  
 
  of Blood     data 
 
 Hemoglobi  12.2 -   g/dL  Low  No   Sep 2 
 
 n   16.2    informati  2017 6:25
 
 [Mass/vol     on in    AM
 
 ume] in      source  
 
 Blood     data 
 
 Lymphocyt  0.7 - 4.5  K/mm3  Normal  No   Sep 2 
 
 es      informati   6:25
 
 [#/volume     on in    AM
 
 ] in      source  
 
 Unspecifi     data 
 
 ed      
 
 specimen      
 
 by      
 
 Automated     
 
  count     
 
 Lymphocyt  10 - 50.0  %  Normal  No   Sep 2 
 
 es      informati  2017 6:25
 
 [#/volume     on in    AM
 
 ] in      source  
 
 Unspecifi     data 
 
 ed      
 
 specimen      
 
 by      
 
 Automated     
 
  count     
 
 Erythrocy  27 - 31.2  pg  Normal  No   Sep 2 
 
 te mean      informati  2017 6:25
 
 corpuscul     on in    AM
 
 ar      source  
 
 hemoglobi     data 
 
 n      
 
 [Entitic      
 
 mass]     
 
 Erythrocy  31.8 -   g/dl  Normal  No   Sep 2 
 
 te mean   35.4    informati  2017 6:25
 
 corpuscul     on in    AM
 
 ar      source  
 
 hemoglobi     data 
 
 n      
 
 concentra     
 
 tion      
 
 [Mass/vol     
 
 ume] by      
 
 Automated     
 
  count     
 
 Erythrocy  82.2 -   fl  Normal  No   Sep 2 
 
 te mean   97.8    informati  2017 6:25
 
 corpuscul     on in    AM
 
 ar volume     source  
 
  [Entitic     data 
 
  volume]      
 
 by      
 
 Automated     
 
  count     
 
 Monocytes  0.1 - 1.0  K/mm3  Normal  No   Sep 2 
 
       informati  2017 6:25
 
 [#/volume     on in    AM
 
 ] in      source  
 
 Blood by      data 
 
 Automated     
 
  count     
 
 Monocytes  1.7 - 9.3  %  High  No   Sep 2 
 
 /100      informati  2017 6:25
 
 leukocyte     on in    AM
 
 s in      source  
 
 Blood by      data 
 
 Automated     
 
  count     
 
 Platelet   7.4 -   fl  Normal  No   Sep 2 
 
 mean   10.4    informati  2017 6:25
 
 volume      on in    AM
 
 [Entitic      source  
 
 volume]      data 
 
 in Blood      
 
 by      
 
 Automated     
 
  count     
 
 Platelets  142 - 424  K/mm3  Normal  No   Sep 2 
 
       informati  2017 6:25
 
 [#/volume     on in    AM
 
 ] in      source  
 
 Blood     data 
 
 Erythrocy  4.2 - 5.4  M/mm3  Low  No   Sep 2 
 
 karen      informati  2017 6:25
 
 [#/volume     on in    AM
 
 ] in      source  
 
 Amniotic      data 
 
 fluid     
 
 Erythrocy  11.5 -   %  Normal  No   Sep 2 
 
 te   17.5    informati  2017 6:25
 
 distribut     on in    AM
 
 ion width     source  
 
  [Entitic     data 
 
  volume]      
 
 by      
 
 Automated     
 
  count     
 
 Leukocyte  4.8 -   K/MM3  No   No   Sep 2 
 
 s   10.8   informati  informati  2017 6:25
 
 [#/volume    on in   on in    AM
 
 ] in     source   source  
 
 Blood    data  data 
 
 
 
 
 CRP
 
 
 
 
 Observa  Value  Referen  Units  Interpr  Notes  Date
 
 tion   ce    etation  
 
   Range    
 
 
 
 
 CRP  0.0 - 0.9  MG/DL  High  No   Sep 1 
 
     informati  2017 6:40
 
     on in    PM
 
     source  
 
     data 
 
 
 
 
 Lactate [Moles/volume] in Blood
 
 
 
 
 Observa  Value  Referen  Units  Interpr  Notes  Date
 
 tion   ce    etation  
 
   Range    
 
 
 
 
 Lactate   0.4 - 2.0  mmol/L  Normal  No   Sep 1 
 
 [Moles/vo     informati  2017 6:40
 
 lume] in      on in    PM
 
 Blood     source  
 
     data 
 
 
 
 
 Comprehensive metabolic 2000 panel in Serum or Plasma
 
 
 
 
 Observa  Value  Referen  Units  Interpr  Notes  Date
 
 tion   ce    etation  
 
   Range    
 
 
 
 
 Albumin/G  1.1 - 1.8  No   Low  No   Sep 1 
 
 lobulin    informati   informati  2017 6:40
 
 [Mass    on in    on in    PM
 
 ratio] in   source    source  
 
  Serum or   data   data 
 
  Plasma     
 
 Albumin   3.4 - 5.0  gm/dL  Low  No   Sep 1 
 
 [Mass/vol     informati  2017 6:40
 
 ume] in      on in    PM
 
 Serum or      source  
 
 Plasma     data 
 
 Alkaline   46 - 116  U/L  High  No   Sep 1 
 
 phosphata     informati  2017 6:40
 
 se      on in    PM
 
 [Enzymati     source  
 
 c      data 
 
 activity/     
 
 volume]      
 
 in Serum      
 
 or Plasma     
 
 Bilirubin  0.2 - 1.0  mg/dL  Normal  No   Sep 1 
 
 .total      informati  2017 6:40
 
 [Mass/vol     on in    PM
 
 ume] in      source  
 
 Serum or      data 
 
 Plasma     
 
 Urea   7 - 18  mg/dL  Low  No   Sep 1 
 
 nitrogen      informati  2017 6:40
 
 [Mass/vol     on in    PM
 
 ume] in      source  
 
 Serum or      data 
 
 Plasma     
 
 Calcium   8.5 -   mg/dL  Normal  No   Sep 1 
 
 [Mass/vol  10.1    informati  2017 6:40
 
 ume] in      on in    PM
 
 Serum or      source  
 
 Plasma     data 
 
 Chloride   98 - 107  mmoL/L  Low  No   Sep 1 
 
 [Moles/vo     informati  2017 6:40
 
 lume] in      on in    PM
 
 Serum or      source  
 
 Plasma     data 
 
 Carbon   21.0 -   mmoL/L  Normal  No   Sep 1 
 
 dioxide,   32.0    informati  2017 6:40
 
 total      on in    PM
 
 [Moles/vo     source  
 
 lume] in      data 
 
 Serum or      
 
 Plasma     
 
 Creatinin  0.55 -   mg/dL  Normal  No   Sep 1 
 
 e   1.02    informati  2017 6:40
 
 [Mass/vol     on in    PM
 
 ume] in      source  
 
 Serum or      data 
 
 Plasma     
 
 Creatinin  50 - 200  ML/MIN  Normal  No   Sep 1 
 
 e renal      informati  2017 6:40
 
 clearance     on in    PM
 
       source  
 
 predicted     data 
 
  by      
 
 Cockcroft     
 
 -Gault      
 
 formula     
 
 Estimated  59-  ML/MIN  No   REFERENCE  Sep 1 
 
      informati   RANGE:   2017 6:40
 
 glomerula    on in   >60    PM
 
 r     source   ML/MIN/1. 
 
 filtratio    data  73 SQUARE 
 
 n rate       METERSIf 
 
 (GF      this  
 
     patient  
 
     is  
 
     -A 
 
     merican,  
 
     then  
 
     multiply  
 
     theresult 
 
      by  
 
     1.210. 
 
 Globulin   1.3 - 3.2  gm/dL  High  No   Sep 1 
 
 [Mass/vol     informati  2017 6:40
 
 ume] in      on in    PM
 
 Serum     source  
 
     data 
 
 Glucose   74 - 106  mg/dL  High  No   Sep 1 
 
 [Mass/vol     informati  2017 6:40
 
 ume] in      on in    PM
 
 Serum or      source  
 
 Plasma     data 
 
 Potassium  3.5 - 5.1  mmoL/L  Low  No   Sep 1 
 
       informati  2017 6:40
 
 [Moles/vo     on in    PM
 
 lume] in      source  
 
 Serum or      data 
 
 Plasma     
 
 Sodium   136 - 145  mmoL/L  Low  No   Sep 1 
 
 [Moles/vo     informati  2017 6:40
 
 lume] in      on in    PM
 
 Serum or      source  
 
 Plasma     data 
 
 Aspartate  15 - 37  U/L  Normal  No   Sep 1 
 
       informati  2017 6:40
 
 aminotran     on in    PM
 
 sferase      source  
 
 [Enzymati     data 
 
 c      
 
 activity/     
 
 volume]      
 
 in Serum      
 
 or Plasma     
 
 Alanine   12 - 78  U/L  Normal  No   Sep 1 
 
 aminotran     informati  2017 6:40
 
 sferase      on in    PM
 
 [Enzymati     source  
 
 c      data 
 
 activity/     
 
 volume]      
 
 in Serum      
 
 or Plasma     
 
 Protein   6.4 - 8.2  gm/dL  High  No   Sep 1 
 
 [Mass/vol     informati   6:40
 
 ume] in      on in    PM
 
 Serum or      source  
 
 Plasma     data 
 
 
 
 
 CBC W Auto Differential panel in Blood
 
 
 
 
 Observa  Value  Referen  Units  Interpr  Notes  Date
 
 tion   ce    etation  
 
   Range    
 
 
 
 
 Basophils  0 - 0.2  K/MM3  Normal  No   Sep 1 
 
       informati  2017 6:40
 
 [#/volume     on in    PM
 
 ] in      source  
 
 Blood by      data 
 
 Automated     
 
  count     
 
 Basophils  0.1 - 2.0  %  Normal  No   Sep 1 
 
 /100      informati  2017 6:40
 
 leukocyte     on in    PM
 
 s in      source  
 
 Blood by      data 
 
 Automated     
 
  count     
 
 Eosinophi  0.0 - 0.4  K/mm3  Normal  No   Sep 1 
 
 ls      informati  2017 6:40
 
 [#/volume     on in    PM
 
 ] in      source  
 
 Blood by      data 
 
 Automated     
 
  count     
 
 Eosinophi  0.1 -   %  Normal  No   Sep 1 
 
 ls/100   12.0    informati  2017 6:40
 
 leukocyte     on in    PM
 
 s in      source  
 
 Blood by      data 
 
 Automated     
 
  count     
 
 Granulocy  1.8 - 7.8  K/mm3  Normal  No   Sep 1 
 
 karen      informati  2017 6:40
 
 [#/volume     on in    PM
 
 ] in      source  
 
 Blood by      data 
 
 Automated     
 
  count     
 
 Granulocy  37.0 -   %  Normal  No   Sep 1 
 
 karen/100   80.0    informati  2017 6:40
 
 leukocyte     on in    PM
 
 s in      source  
 
 Blood by      data 
 
 Automated     
 
  count     
 
 Hematocri  37.0 -   %  Low  No   Sep 1 
 
 t [Volume  47.0    informati  2017 6:40
 
       on in    PM
 
 Fraction]     source  
 
  of Blood     data 
 
 Hemoglobi  12.2 -   g/dL  No   No   Sep 1 
 
 n   16.2   informati  informati  2017 6:40
 
 [Mass/vol    on in   on in    PM
 
 ume] in     source   source  
 
 Blood    data  data 
 
 Lymphocyt  0.7 - 4.5  K/mm3  Normal  No   Sep 1 
 
 es      informati  2017 6:40
 
 [#/volume     on in    PM
 
 ] in      source  
 
 Unspecifi     data 
 
 ed      
 
 specimen      
 
 by      
 
 Automated     
 
  count     
 
 Lymphocyt  10 - 50.0  %  Normal  No   Sep 1 
 
 es      informati  2017 6:40
 
 [#/volume     on in    PM
 
 ] in      source  
 
 Unspecifi     data 
 
 ed      
 
 specimen      
 
 by      
 
 Automated     
 
  count     
 
 Erythrocy  27 - 31.2  pg  Normal  No   Sep 1 
 
 te mean      informati  2017 6:40
 
 corpuscul     on in    PM
 
 ar      source  
 
 hemoglobi     data 
 
 n      
 
 [Entitic      
 
 mass]     
 
 Erythrocy  31.8 -   g/dl  Normal  No   Sep 1 
 
 te mean   35.4    informati  2017 6:40
 
 corpuscul     on in    PM
 
 ar      source  
 
 hemoglobi     data 
 
 n      
 
 concentra     
 
 tion      
 
 [Mass/vol     
 
 ume] by      
 
 Automated     
 
  count     
 
 Erythrocy  82.2 -   fl  Normal  No   Sep 1 
 
 te mean   97.8    informati  2017 6:40
 
 corpuscul     on in    PM
 
 ar volume     source  
 
  [Entitic     data 
 
  volume]      
 
 by      
 
 Automated     
 
  count     
 
 Monocytes  0.1 - 1.0  K/mm3  Normal  No   Sep 1 
 
       informati  2017 6:40
 
 [#/volume     on in    PM
 
 ] in      source  
 
 Blood by      data 
 
 Automated     
 
  count     
 
 Monocytes  1.7 - 9.3  %  Normal  No   Sep 1 
 
 /100      informati  2017 6:40
 
 leukocyte     on in    PM
 
 s in      source  
 
 Blood by      data 
 
 Automated     
 
  count     
 
 Platelet   7.4 -   fl  Low  No   Sep 1 
 
 mean   10.4    informati  2017 6:40
 
 volume      on in    PM
 
 [Entitic      source  
 
 volume]      data 
 
 in Blood      
 
 by      
 
 Automated     
 
  count     
 
 Platelets  142 - 424  K/mm3  Normal  No   Sep 1 
 
       informati  2017 6:40
 
 [#/volume     on in    PM
 
 ] in      source  
 
 Blood     data 
 
 Erythrocy  4.2 - 5.4  M/mm3  Low  No   Sep 1 
 
 karen      informati  2017 6:40
 
 [#/volume     on in    PM
 
 ] in      source  
 
 Amniotic      data 
 
 fluid     
 
 Erythrocy  11.5 -   %  Normal  No   Sep 1 
 
 te   17.5    informati  2017 6:40
 
 distribut     on in    PM
 
 ion width     source  
 
  [Entitic     data 
 
  volume]      
 
 by      
 
 Automated     
 
  count     
 
 Leukocyte  4.8 -   K/MM3  Normal  No   Sep 1 
 
 s   10.8    informati   6:40
 
 [#/volume     on in    PM
 
 ] in      source  
 
 Blood     data 
 
 
 
 
 HepC Qnt-ARUP
 
 
 
 
 Observa  Value  Referen  Units  Interpr  Notes  Date
 
 tion   ce    etation  
 
   Range    
 
 Hepatit  7.3  No   log_IU  No   INTERPR   
 
 is C    informa   informa  ETIVE   2017 
 
 RNA   tion in   tion in  INFORMA  3:50 PM
 
    source    source  TION:  
 
    data    data  Hepatit 
 
      is C  
 
      Virus  
 
      by  
 
      Quantit 
 
      ative  
 
      PCR\.br 
 
      \The  
 
      quantit 
 
      ative  
 
      range  
 
      of this 
 
       assay  
 
      is 1.2  
 
      - 8.0  
 
      log  
 
      IU/mL  
 
      (15-\.b 
 
      r\100,0 
 
      00,000  
 
      IU/mL). 
 
      \.br\Li 
 
      elaine of  
 
      detecti 
 
      on  
 
      (LOD):\ 
 
      .br\15  
 
      IU/mL  
 
      (1.2  
 
      log  
 
      IU/mL)\ 
 
      .br\LOD 
 
       values 
 
       do not 
 
       apply  
 
      to  
 
      diluted 
 
        
 
      specime 
 
      ns.\.br 
 
      \An  
 
      interpr 
 
      etation 
 
       of  
 
      "Not  
 
      Detecte 
 
      d" does 
 
       not  
 
      rule  
 
      out the 
 
        
 
      presenc 
 
      e\.br\o 
 
      f PCR  
 
      inhibit 
 
      ors in  
 
      the  
 
      patient 
 
        
 
      specime 
 
      n or  
 
      hepatit 
 
      is C  
 
      virus  
 
      RNA\.br 
 
      \concen 
 
      tration 
 
      s below 
 
       the  
 
      level  
 
      of  
 
      detecti 
 
      on of  
 
      the  
 
      test.  
 
      Care\.b 
 
      r\shoul 
 
      d be  
 
      taken  
 
      when  
 
      interpr 
 
      eting  
 
      any  
 
      single  
 
      viral  
 
      load\.b 
 
      r\deter 
 
      minatio 
 
      n.\.br\ 
 
      This  
 
      test  
 
      should  
 
      not be  
 
      used  
 
      for  
 
      blood  
 
      donor  
 
      screeni 
 
      ng,  
 
      associa 
 
      regi\.br 
 
      \re-ent 
 
      ry  
 
      protoco 
 
      ls, or  
 
      for  
 
      screeni 
 
      ng  
 
      Human  
 
      Cell,  
 
      Tissues 
 
        
 
      and\.br 
 
      \Cellul 
 
      ar  
 
      Tissue- 
 
      Based  
 
      Product 
 
      s  
 
      (HCT/P) 
 
      . 
 
 HCV   19,000,  No   IU/mL  No   No    
 
 IU-ARUP  000  informa   informa  informa  2017 
 
   tion in   tion in  tion in  3:50 PM
 
    source    source   source 
 
    data    data   data 
 
 HCV RNA  Detecte  Not   No   Abnorma  No    
 
    d  Detecte  informa  l  informa  2017 
 
 Interpr   d  tion in   tion in  3:50 PM
 
 etation     source    source 
 
     data    data 
 
 EER HCV  See   No   No   No   Access    
 
  RNA   Note  informa  informa  informa  ARUP   2017 
 
 Quant    tion in  tion in  tion in  Enhance  3:50 PM
 
 RT-PCR-    source   source   source  d  
 
 [facility]    data   data   data  Report  
 
      using  
 
      either  
 
      link  
 
      below:\ 
 
      .br\\.b 
 
      r\-Dire 
 
      ct  
 
      access: 
 
        
 
      https:/ 
 
      [email or web address] 
 
      [id number] 
 
      [id number] 
 
      [id number] 
 
      \.br\\. 
 
      br\-Ent 
 
      er  
 
      [place] 
 
      e,  
 
      [employer] 
 
      d:  
 
      https:/ 
 
      [email or web address] 
 
      [employer] 
 
      com\.br 
 
      \  
 
      [place] 
 
      e:  
 
      9Km?q-E 
 
      7\.br\  
 
      [employer] 
 
      d:  
 
      X!d3+c\ 
 
      .br\Per 
 
      formed  
 
      by EMERITA garcia,\ 
 
      .br\[address] 
 
        
 
      Yusef Cummins  
 
      [facility] [place]  
 
      [id number]  
 
      [phone]\. 
 
      br\www. 
 
      [employer] 
 
      .com,  
 
      Ramesh Blankenship MD -  
 
      Lab.  
 
      Direct[employer] 
 
      r 
 
 
 
 
 EK EKG 12 LEAD
 
 
 
 
 Observa  Value  Referen  Units  Interpr  Notes  Date
 
 tion   ce    etation  
 
   Range    
 
    Station  No   No   No   No    
 
  clara ECG  informa  informa  informa  informa  2017 
 
     tion in  tion in  tion in  tion in  5:20 PM
 
  Study\.   source   source   source   source 
 
  br\St.    data   data   data   data 
 
  Elizabe     
 
  th      
 
  [place]     
 
  h\.br\I     
 
  nterpre     
 
  tive      
 
  Stateme     
 
  nts\.br     
 
  \Sinus      
 
  bradyca     
 
  rdia\.b     
 
  r\Chrissie     
 
  l ECG      
 
  except      
 
  for      
 
  rate\.b     
 
  r\Elect     
 
  ronical     
 
  ly      
 
  Signed      
 
  On      
 
  7-15-20     
 
  17      
 
  11:30:5     
 
  4 EDT      
 
  by Price Whitman MD     
 
 
 
 
 Chlamyd/GC TMA
 
 
 
 
 Observa  Value  Referen  Units  Interpr  Notes  Date
 
 tion   ce    etation  
 
   Range    
 
 [place]  Negativ  No   No   No   No    
 
 ia   e  informa  informa  informa  informa   
 
 trachom   tion in  tion in  tion in  tion in  12:45 
 
 atis    source   source   source   source  PM
 
    data   data   data   data 
 
 Neisser  Negativ  No   No   No   Testing   
 
 ia   e  informa  informa  informa      
 
 gonorrh   tion in  tion in  tion in  methodo  12:45 
 
 oeae    source   source   source  logy is  PM
 
    data   data   data    
 
      transcr 
 
      iption  
 
      mediate 
 
      d  
 
      amplifi 
 
      cation  
 
      (TMA)  
 
      using  
 
      the  
 
      Aptima  
 
      Combo 2 
 
       assay  
 
      from  
 
      Nephros 
 
      [email or web address] 
 
      be.\.br 
 
      \A  
 
      negativ 
 
      e  
 
      result  
 
      does  
 
      not  
 
      complet 
 
      ely  
 
      rule  
 
      out a  
 
      [place]yd 
 
      ia  
 
      trachom 
 
      atis or 
 
        
 
      Neisser 
 
      ia  
 
      gonorrh 
 
      oeae  
 
      infecti 
 
      on due  
 
      to  
 
      potenti 
 
      al  
 
      inhibit 
 
      ors or  
 
      levels  
 
      present 
 
       below  
 
      the  
 
      limit  
 
      of  
 
      detecti 
 
      on by  
 
      this  
 
      assay.  
 
        
 
      Results 
 
       are  
 
      depende 
 
      nt on  
 
      proper  
 
      collect 
 
      ion and 
 
        
 
      transpo 
 
      rt of  
 
      specime 
 
      n. This 
 
       test  
 
      is  
 
      indicat 
 
      ed for  
 
      medical 
 
        
 
      purpose 
 
      s only  
 
      and  
 
      should  
 
      not be  
 
      used  
 
      for  
 
      legal  
 
      or  
 
      forensi 
 
      c  
 
      purpose 
 
      s.\.br\ 
 
      \.br\Th 
 
      e  
 
      perform 
 
      ance  
 
      charact 
 
      eristic 
 
      s of  
 
      this  
 
      test  
 
      were  
 
      validat 
 
      ed by  
 
      [facility] 
 
      are  
 
      laborat 
 
      ory.  
 
      This  
 
      assay  
 
      is FDA  
 
      cleared 
 
       to  
 
      test  
 
      the  
 
      followi 
 
      ng  
 
      specime 
 
      ns:  
 
      clinici 
 
      an-kelechi 
 
      ected  
 
      endocer 
 
      vical,  
 
      vaginal 
 
       and  
 
      male  
 
      urethra 
 
      l swab  
 
      specime 
 
      ns,  
 
      patient 
 
        
 
      collect 
 
      ed  
 
      vaginal 
 
        
 
      specime 
 
      ns  
 
      within  
 
      a  
 
      clinic  
 
      setting 
 
      , Thin  
 
      Prep  
 
      Specime 
 
      ns in  
 
      Preserv 
 
      Cyt  
 
      Solutio 
 
      n, and  
 
      first-s 
 
      tream,  
 
      unprese 
 
      rved  
 
      male  
 
      urine  
 
      specime 
 
      ns.  
 
      Testing 
 
       on  
 
      female  
 
      urine  
 
      is not  
 
      FDA  
 
      approve 
 
      d by  
 
      this  
 
      methodo 
 
      logy,  
 
      but has 
 
       been  
 
      develop 
 
      ed and  
 
      validat 
 
      ed by  
 
      the [facility] 
 
      are  
 
      laborat 
 
      ory.   
 
      Detaile 
 
      d  
 
      methodo 
 
      logy is 
 
        
 
      availab 
 
      le upon 
 
        
 
      request 
 
      . 
 
 
 
 
 Hep Prf-Ac
 
 
 
 
 Observa  Value  Referen  Units  Interpr  Notes  Date
 
 tion   ce    etation  
 
   Range    
 
 Hepatit  Negativ  Negativ  No   No   No    
 
 is B   e  e  informa  informa  informa  2017 
 
 virus     tion in  tion in  tion in  9:05 AM
 
 surface     source   source   source 
 
  Ag      data   data   data 
 
 [Presen      
 
 ce] in       
 
 Serum       
 
 by       
 
 Immunoa      
 
 ssay      
 
 Hep B   Negativ  Negativ  No   No   No    
 
 Core   e  e  informa  informa  informa  2017 
 
 IgM    tion in  tion in  tion in  9:05 AM
 
     source   source   source 
 
     data   data   data 
 
 Hepatit  Negativ  Negativ  No   No   No    
 
 is A   e  e  informa  informa  informa  2017 
 
 virus     tion in  tion in  tion in  9:04 AM
 
 Ab      source   source   source 
 
 [Units/     data   data   data 
 
 volume]      
 
  in       
 
 Serum       
 
 by       
 
 Radioim      
 
 munoass      
 
 ay       
 
 (MANNY)      
 
 Hep C   Positiv  Negativ  No   Abnorma  High    
 
 Ab  e  e  informa  l  signal/  2017 
 
    tion in   cutoff   9:21 AM
 
     source   ratio  
 
     data   (>= 5,  
 
      Archite 
 
      ct  
 
      Anti-HC 
 
      V). [facility] 
 
      are  
 
      Laborat 
 
      ory  
 
      recomme 
 
      nds  
 
      collect 
 
      ing a  
 
      new  
 
      specime 
 
      n and  
 
      testing 
 
       for  
 
      Hepatit 
 
      is C  
 
      RNA  
 
      Quantit 
 
      ative  
 
      PCR to  
 
      confirm 
 
        
 
      positiv 
 
      ity and 
 
        
 
      provide 
 
       a  
 
      baselin 
 
      e viral 
 
       load  
 
      for  
 
      monitor 
 
      ing  
 
      treatme 
 
      nt  
 
      efficac 
 
      y. 
 
 
 
 
 Mg
 
 
 
 
 Observa  Value  Referen  Units  Interpr  Notes  Date
 
 tion   ce    etation  
 
   Range    
 
 Magnesi  2.0  1.6 -   mg/dL  No   No    
 
 um    2.4   informa  informa   
 
 [Moles/     tion in  tion in  6:37 PM
 
 volume]      source   source 
 
  in       data   data 
 
 Serum       
 
 or       
 
 Plasma      
 
 
 
 
 TSH
 
 
 
 
 Observa  Value  Referen  Units  Interpr  Notes  Date
 
 tion   ce    etation  
 
   Range    
 
 Thyrotr  0.516  0.270 -  mcIU/mL  No   No    
 
 opin     4.200   informa  informa   
 
 [Units/     tion in  tion in  6:37 PM
 
 volume]      source   source 
 
  in       data   data 
 
 Serum       
 
 or       
 
 Plasma      
 
 
 
 
 GGT
 
 
 
 
 Observa  Value  Referen  Units  Interpr  Notes  Date
 
 tion   ce    etation  
 
   Range    
 
 Gamma   52  5 - 36  IU/L  High  No    
 
 glutamy      informa  2017 
 
 l       tion in  6:37 PM
 
 transfe       source 
 
 rase        data 
 
 [Enzyma      
 
 tic       
 
 activit      
 
 y/volum      
 
 e] in       
 
 Serum       
 
 or       
 
 Plasma      
 
 
 
 
 Hemogram
 
 
 
 
 Observa  Value  Referen  Units  Interpr  Notes  Date
 
 tion   ce    etation  
 
   Range    
 
 LEUKOCY  9.7  4.0 -   x10(3)/  No   No    
 
 KAREN   11.0  mcL  informa  informa  2017 
 
     tion in  tion in  3:55 PM
 
      source   source 
 
      data   data 
 
 Erythro  3.86  3.80 -   x10(6)/  No   No    
 
 cytes    5.10  mcL  informa  informa   
 
 [#/volu     tion in  tion in  3:55 PM
 
 me] in       source   source 
 
 Blood       data   data 
 
 by       
 
 Automat      
 
 ed       
 
 count      
 
 Hemoglo  11.7  12.0 -   gm/dL  Low  No    
 
 bin    15.6    informa   
 
 [Mass/v      tion in  3:55 PM
 
 olume]        source 
 
 in        data 
 
 Blood      
 
 Hematoc  35.9  35.7 -   %  No   No    
 
 rit    45.9   informa  informa   
 
 [Volume     tion in  tion in  3:55 PM
 
        source   source 
 
 Fractio      data   data 
 
 n] of       
 
 Blood       
 
 by       
 
 Automat      
 
 ed       
 
 count      
 
 Erythro  93.2  82.5 -   fL  No   No    
 
 cyte    99.8   informa  informa  2017 
 
 mean      tion in  tion in  3:55 PM
 
 corpusc      source   source 
 
 ular       data   data 
 
 volume       
 
 [Entiti      
 
 c       
 
 volume]      
 
  by       
 
 Automat      
 
 ed       
 
 count      
 
 Erythro  30.2  27.0 -   pg  No   No    
 
 cyte    34.3   informa  informa   
 
 mean      tion in  tion in  3:55 PM
 
 corpusc      source   source 
 
 ular       data   data 
 
 hemoglo      
 
 bin       
 
 [Entiti      
 
 c mass]      
 
  by       
 
 Automat      
 
 ed       
 
 count      
 
 Erythro  32.4  32.1 -   gm/dL  No   No    
 
 cyte    35.3   informa  informa  2017 
 
 mean      tion in  tion in  3:55 PM
 
 corpusc      source   source 
 
 ular       data   data 
 
 hemoglo      
 
 bin       
 
 concent      
 
 ration       
 
 [Mass/v      
 
 olume]       
 
 by       
 
 Automat      
 
 ed       
 
 count      
 
 Erythro  15.1  11.5 -   %  High  No    
 
 cyte    15.0    informa  2017 
 
 distrib      tion in  3:55 PM
 
 ution        source 
 
 width        data 
 
 [Ratio]      
 
  by       
 
 Automat      
 
 ed       
 
 count      
 
 Platele  234  144 -   x10(3)/  No   No    
 
 ts    423  mcL  informa  informa  2017 
 
 [#/volu     tion in  tion in  3:55 PM
 
 me] in       source   source 
 
 Blood       data   data 
 
 by       
 
 Automat      
 
 ed       
 
 count      
 
 MPV  8.3  6.8 -   fL  No   No    
 
   10.8   informa  informa  2017 
 
     tion in  tion in  3:55 PM
 
      source   source 
 
      data   data 
 
 
 
 
 UA
 
 
 
 
 Observa  Value  Referen  Units  Interpr  Notes  Date
 
 tion   ce    etation  
 
   Range    
 
 UA   Dorinda  No   No   No   No    
 
 Color   informa  informa  informa  informa  2017 
 
   tion in  tion in  tion in  tion in  8:39 PM
 
    source   source   source   source 
 
    data   data   data   data 
 
 UA   Cloudy  Clear  No   Abnorma  No    
 
 Appear    informa  l  informa  2017 
 
    tion in   tion in  8:39 PM
 
     source    source 
 
     data    data 
 
 UA   Negativ  Negativ  No   No   No    
 
 Glucose  e  e  informa  informa  informa  2017 
 
    tion in  tion in  tion in  8:39 PM
 
     source   source   source 
 
     data   data   data 
 
 UA   Negativ  Negativ  No   No   No    
 
 Ketones  e  e  informa  informa  informa   
 
    tion in  tion in  tion in  8:39 PM
 
     source   source   source 
 
     data   data   data 
 
 UA   Negativ  Negativ  No   No   No    
 
 Blood  e  e  informa  informa  informa  2017 
 
    tion in  tion in  tion in  8:39 PM
 
     source   source   source 
 
     data   data   data 
 
 UA pH  5.0  4.8 -   No   No   Referen   
 
   8.0  informa  informa  ce   2017 
 
    tion in  tion in  range   8:39 PM
 
     source   source  valid  
 
     data   data  for  
 
      random  
 
      specime 
 
      ns  
 
      only. 
 
 UA   100   Negativ  No   Abnorma  No    
 
 Protein  mg/dl  e  informa  l  informa   
 
    tion in   tion in  8:39 PM
 
     source    source 
 
     data    data 
 
 UA   2 mg/dl  <=1   No   Abnorma  No    
 
 Urobili   mg/dl  informa  l  informa  2017 
 
 nogen    tion in   tion in  8:39 PM
 
     source    source 
 
     data    data 
 
 UA   Negativ  Negativ  No   No   No    
 
 Nitrite  e  e  informa  informa  informa  2017 
 
    tion in  tion in  tion in  8:39 PM
 
     source   source   source 
 
     data   data   data 
 
 UA Leuk  Large  Negativ  No   Abnorma  No    
 
  Est   e  informa  l  informa  2017 
 
    tion in   tion in  8:39 PM
 
     source    source 
 
     data    data 
 
 UA Spec  1.028  1.001 -  No   No   Referen   
 
  Grav    1.035  informa  informa  ce   2017 
 
    tion in  tion in  range   8:39 PM
 
     source   source  valid  
 
     data   data  for  
 
      random  
 
      specime 
 
      ns  
 
      only. 
 
 UA WBC  39  0 - 4  /HPF  High  No    
 
      informa  2017 
 
      tion in  8:39 PM
 
       source 
 
       data 
 
 UA RBC  4  0 - 3  /HPF  High  No    
 
      informa  2017 
 
      tion in  8:39 PM
 
       source 
 
       data 
 
 UA   4+  No   No   No   No    
 
 Squam    informa  informa  informa  informa  2017 
 
 Epi   tion in  tion in  tion in  tion in  8:39 PM
 
    source   source   source   source 
 
    data   data   data   data 
 
 UA   3+  No   No   No   No    
 
 Mucous   informa  informa  informa  informa  2017 
 
   tion in  tion in  tion in  tion in  8:39 PM
 
    source   source   source   source 
 
    data   data   data   data 
 
 UA   Trace  No   No   Abnorma  No    
 
 Bacteri   informa  informa  l  informa  2017 
 
 a   tion in  tion in   tion in  8:39 PM
 
    source   source    source 
 
    data   data    data 
 
 UA CA   Few  No   No   No   No    
 
 Ox Flor   informa  informa  informa  informa  2017 
 
   tion in  tion in  tion in  tion in  8:39 PM
 
    source   source   source   source 
 
    data   data   data   data

## 2017-10-07 NOTE — EXTERNAL MEDICAL SUMMARY RPT
Patient Summary
 Created on: 10/05/2017
 
BERYL MANN
: 68
Sex: Undifferentiated
 
Demographics
 
 
 
 Preferred Language  English
 
 Marital Status  Unknown
 
 [employer] Affiliation  Unknown
 
 Race  Unknown
 
 Ethnic Group  Unknown
 
 
Author
 
 
 
 Author            SAMIR
 
 Address  Unknown
 
 Phone  [email or web address]
 
                                                                
 
Immunization
                                    No patient found.

## 2017-10-07 NOTE — EXTERNAL MEDICAL SUMMARY RPT
Patient Summary
 Created on: 10/05/2017
 
BERYL MANN
External Reference #: [id number]
: 68
Sex: Female
 
Demographics
 
 
 
 Address  [address] [place]  [address]
 
 Preferred Language  English
 
 Marital Status  Unknown
 
 [employer] Affiliation  Unknown
 
 Race  Unknown
 
 Ethnic Group  Unknown
 
 
Author
 
 
 
 Author            ,            SAMIR DAWSON
 
 Address  Unknown
 
 Phone  [email or web address]
 
 
 
Care Team Providers
 
 
 
 Care Team Member Name  Role  Phone
 
 VINICIO COOK,   Unavailable  Unavailable
 
 VINICIO COOK   
 
 ELLIS ALL, ELLIS ALL   Unavailable  Unavailable
 
 [facility]   Un[facility]  Unavailable
 
 [facility]   
 
 RAJESH PAMELA,   Unavailable  Unavailable
 
 RAJESH PAMELA   
 
 RAJESH PAMELA,   Unavailable  Unavailable
 
 RAJESH PAMELA   
 
 RAJESH, REYNA,   Unavailable  Unavailable
 
 RAJESH, REYNA   
 
 [facility]  Unavailable  Unavailable
 
  [facility]  
 
    
 
 [facility]   Unavailable  Unavailable
 
 [place]   
 
 [facility] [place]  
 
    
 
 [facility]   Unavailable  Unavailable
 
 [place]  
 
  [facility] [place]  
 
    
 
 [facility]   Unavailable  Unavailable
 
 TREMAYNE BECKMANEY   Unavailable  Unavailable
 
 [facility]   
 
 PITO ROMERO,   Unavailable  Unavailable
 
 PITO ROMERO JOHN W,   Unavailable  Unavailable
 
 SHAHLA CRUZ   
 
 [facility]   Unavailable  Unavailable
 
 [facility]   
 
 [facility]   
 
 DANIEL MULLEN,   Unavailable  Unavailable
 
 DANIEL MULLEN   
 
 [facility],  Unavailable  Unavailable
 
  [facility]  
 
    
 
 [facility]   Unavailable  Unavailable
 
 [facility], [facility]   
 
 VÍCTOR GRAVES   Unavailable  Unavailable
 
 VÍCTOR GRAVES   Unavailable  Unavailable
 
 TASH THOMAS,   Unavailable  Unavailable
 
 TASH THOMAS EMMETT P,   Unavailable  Unavailable
 
 SHELLY DUQUE   
 
 [facility]   Unavailable  Unavailable
 
 [facility]   
 
 [facility]   
 
 [facility]   Unavailable  Unavailable
 
 [facility]   
 
 [facility]   
 
 RENUSCH, RENUSCH   Unavailable  Unavailable
 
 GENA ARNOLD,   Unavailable  Unavailable
 
 GENA ARNOLD PHILLIP A,   Unavailable  Unavailable
 
 ÁLVARO CORDERO A C, WRIGHT,   Unavailable  Unavailable
 
 ASA C   
 
                                            
 
Purpose
                      Continuity of Care Document - 2009 through 10-05-
2017                                                                            
                     
 
Problems
                      
 
 
 Code         Diagnosis    DOS          Provider     Status     
 
                                                               
 
               
 
 [id number]       CUTANEOUS   2017   [facility]              
 
              ABSCESS OF                PHYSICIANS,             
 
      RIGHT UPPER           PLLC                   
 
   LIMB                       
 
                  
 
      
 
 [id number]         ANEMIA   2015   [facility]              
 
              UNSPECIFIED               PHYSICIANS,             
 
                            PLLC                   
 
                          
 
      
 
 I10          ESSENTIAL   [facility]              
 
      HYPERTENSIO          INC                     
 
  N                          
 
             
 
 [id number]       CELLULITIS   2015   [facility]              
 
              OF RIGHT                PHYSICIANS,             
 
      UPPER LIMB            PLLC                   
 
                              
 
             
 
 R945         ABNORMAL   2015   [facility]              
 
              RESULTS OF                [facility]              
 
      LIVER           INC                     
 
  FUNCTION                 
 
  STUDIES       
 
                
 
        
 
 Z720         TOBACCO USE  2015   [facility]              
 
                                        [facility]              
 
                       INC                     
 
                 
 
 [id number]       UNSPECIFIED  10-   [facility]              
 
               ASTHMA                [facility]              
 
      UNCOMPLICAT          INC                     
 
  ED                         
 
              
 
 66046        ASTHMA,   2015   [facility]              
 
              UNSPECIFIED               PHYSICIANS              
 
      ,           GROUP                   
 
  UNSPECIFIED                 
 
   STATUS         
 
                
 
        
 
 [id number]        OTHER   2015   [facility]              
 
              MALAISE AND               PHYSICIANS              
 
       FATIGUE             GROUP                   
 
                              
 
           
 
 7905         OTHER   2015   [facility]              
 
              NONSPECIFIC               PHYSICIANS              
 
       ABNORMAL           GROUP                   
 
  SERUM                  
 
  ENZYME      
 
  LEVELS        
 
                
 
       
 
 39518        DEGEN   2015   [place]              
 
              LUMBAR/LUMB               MEDICAL              
 
      OSACRAL           IMAGING ASS             
 
  INTERVERTEB                 
 
  RAL DISC              
 
                
 
         
 
 7245         UNSPECIFIED  2015   [place]              
 
               BACKACHE                 MEDICAL              
 
                           IMAGING ASS             
 
                        
 
            
 
 7804         DIZZINESS   2015   [place]              
 
              AND                MEDICAL              
 
      GIDDINESS            IMAGING ASS             
 
                              
 
                  
 
 87949        SHORTNESS   2015   [place]              
 
              OF BREATH                 MEDICAL              
 
                           IMAGING ASS             
 
                        
 
            
 
 2859         UNSPECIFIED  2014   [facility]                
 
               ANEMIA                                           
 
                                          
 
          
 
 6202         OTHER AND   2014   [facility]              
 
              UNSPECIFIED               PAMELA                     
 
     OVARIAN                                  
 
  CYST            
 
                
 
 3384         CHRONIC   2010   [facility],              
 
              PAIN                VINICIO W               
 
    SYNDROME                                     
 
                        
 
         
 
 V154         PERS HX   2010   DEPT FOR              
 
              PSYCHOLOGIC               PUBLIC HLTH             
 
    AL TRAUMA                                   
 
  PRS HAZARDS             
 
   HEALTH       
 
                
 
        
 
 43346        REGULAR   2010   F[facility]              
 
              ASTIGMATISM               VISION                  
 
                                                 
 
                 
 
 69526        UNSPECIFIED  2010   [facility]              
 
                              SERVICES              
 
    ARTHROPATHY          PSC                     
 
   OTHER                 
 
  SPECIFIED    
 
  SITES         
 
                
 
      
 
 09044        DISPLCMT   2010   [facility]              
 
              LUMBAR                SERVICES              
 
    INTERVERT           PSC                     
 
  DISC W/O                 
 
  MYELOPATHY    
 
                
 
           
 
 7242         LUMBAGO      2010   [facility]ANS              
 
                                        SERVICES              
 
                 PSC                     
 
                 
 
 12289        SPINAL STEN  2010   [facility]              
 
               LUMB REG                [facility]              
 
    W/O           INC                     
 
  NEUROGENIC                 
 
  CLAUDICATIO   
 
  N             
 
             
 
 V571         OTHER   2010   [place]              
 
              PHYSICAL                [facility]              
 
    THERAPY              INC                     
 
                             
 
        
 
 77964        CONTUSION   2010   [place]              
 
              OF BACK                   [facility]              
 
                         INC                     
 
                     
 
 8472         LUMBAR   2010   [place]              
 
              SPRAIN AND                EMERGENCY              
 
    STRAIN               SERVICES              
 
                ASSOCIATES    
 
                   
 
           
 
         PLACE OF   2010   [place]              
 
              OCCURRENCE,               MEDICAL              
 
     HOME                IMAGING              
 
                ASSOCIATES    
 
                  
 
           
 
         FALL FROM   2010   [place]              
 
              OTHER                MEDICAL              
 
    SLIPPING           IMAGING              
 
  TRIPPING OR   ASSOCIATES    
 
   STUMBLING                
 
                       
 
           
 
 4660         ACUTE   10-   [facility],              
 
              BRONCHITIS                VINICIO W               
 
                                                 
 
                   
 
 5276         MUCOCELE OF  10-   [facility]              
 
               SALIVARY                PATHOLOGY              
 
    GLAND                LABORATORY              
 
                              
 
             
 
 39161        DENTAL   2009   MULL[facility],              
 
              CARIES                DANIEL W                
 
    EXTENDING                                   
 
  INTO PULP            
 
                
 
          
 
 5220         PULPITIS     2009   DANIEL MULLEN                
 
                                          
 
           
 
 [id number]        TOOTH   2009   [facility],              
 
              BROKEN FX                DANIEL COOPER                
 
    DUE TO                                   
 
  TRAUMA W/O           
 
  MENTION    
 
  COMP          
 
                
 
                                                                                
                                                                                
                                                                                
                                                                                
                                                                                
             
 
Medications
                      
 
 
 Na  ND  Rx  Da  Fi  Fi  Am  Da  Di  Ph  RX  Ph  St
 
 me  C   No  te  ll  ll  ou  ys  ag  ar   #  ys  at
 
         rm        s   nt      no  ma      ic  us
 
             Or  Da              si  cy      ia    
 
             de  te              s           n     
 
             re                                    
 
             d                                     
 
                                                   
 
                                                   
 
                                                   
 
                                                  
 
                                   
 
                           
 
                      
 
               
 
              
 
 RI  68      09  09      30  8           00  [place]  Ac
 
 OM  38      -0  -2      .0              00  L-  ti
 
 ET  20      4-  9-      00              07  MA  ve
 
 HA  04      20  20                      50  RT    
 
 ZI  10      17  17                      75       
 
 NE  1                                   29  PH    
 
                                            AR    
 
 25                                          MA    
 
                                            CY    
 
 MG                                             
 
                                      #5    
 
 TA                                    91 
 
 BL                                    
 
 ET                                    
 
                                
 
                           
 
                           
 
                           
 
                  
 
                  
 
                  
 
               
 
               
 
 HY  00      09  09      10  3           00  [place]  Ac
 
 DR  40      -0  -2      .0              00  L-  ti
 
 OC  60      4-  9-      00              02  MA  ve
 
 OD  12      20  20                      24  RT    
 
 ON  50      17  17                      17       
 
 -A  1                                   72  PH    
 
 CE                                          AR    
 
 TA                                          MA    
 
 MI                                          CY    
 
 NO                                             
 
 PH                                    #5    
 
 N                                     91 
 
 10                                    
 
 -3                                    
 
 25                             
 
                           
 
                           
 
                           
 
                  
 
                  
 
                  
 
               
 
               
 
               
 
 JAIME  53      09  09      20  10          00  [place]  Ac
 
 LF  74      -0  -2      .0              00  L-  ti
 
 AM  60      4-  9-      00              07  MA  ve
 
 ET  27      20  20                      50  RT    
 
 HO  20      17  17                      75       
 
 XA  5                                   30  PH    
 
 ZO                                          AR    
 
 LE                                          MA    
 
 -T                                          CY    
 
 MP                                              
 
                                      #5    
 
 DS                                    91 
 
                                      
 
 TA                                    
 
 BL                             
 
 ET                        
 
                           
 
                           
 
                  
 
                  
 
                  
 
               
 
               
 
               
 
               
 
 TR  57      09  09      6.  3           00  HO  Ac
 
 AM  66      -0  -2      00              00  ME  ti
 
 AD  40      6-  9-      0               04  TO  ve
 
 OL  37      20  20                      02  WN    
 
    71      17  17                      44       
 
 HC  8                                   70  PH    
 
 L                                           AR    
 
 50                                          MA    
 
                                            CY    
 
 MG                                             
 
                                      OF    
 
 TA                                     
 
 BL                                 CY 
 
 ET                                 NT 
 
                             HI 
 
                        AN 
 
                        A  
 
                           
 
                  
 
                  
 
                  
 
                  
 
                  
 
               
 
               
 
              
 
 CL  00      08  09      40  10          00  HO  Ac
 
 IN  59      -2  -2      .0              00  ME  ti
 
 DA  12      9-  2-      00              06  TO  ve
 
 MY  93      20  20                      09  WN    
 
 CI  20      17  17                      32       
 
 N   1                                   77  PH    
 
 HC                                          AR    
 
 L                                           MA    
 
 30                                          CY    
 
 0                                               
 
 MG                                    OF    
 
                                        
 
 CA                                 CY 
 
 PS                                 NT 
 
 UL                          HI 
 
 E                      AN 
 
                        A  
 
                           
 
                  
 
                  
 
                  
 
                  
 
                  
 
                  
 
                 
 
              
 
 OX  53      08  09      10  2           00  HO  Ac
 
 YC  74      -2  -2      .0              00  ME  ti
 
 OD  60      9-  2-      00              02  TO  ve
 
 ON  20      20  20                      01  WN    
 
 E-  30      17  17                      44       
 
 AC  5                                   93  PH    
 
 ET                                          AR    
 
 AM                                          MA    
 
 IN                                          CY    
 
 OP                                             
 
 HE                                    OF    
 
 N                                       
 
 5-                                 CY 
 
 32                                 NT 
 
 5                           HI 
 
                        AN 
 
                        A  
 
                           
 
                  
 
                  
 
                  
 
                  
 
                  
 
                 
 
               
 
              
 
 AL  00      03  05  5   90  30      EA  16  AR  Ac
 
 RI  78      -1  -1      .0          ST  82  NO  ti
 
 AZ  11      7-  7-      00          SI  20  LD  ve
 
 OL  08      20  20                  DE           
 
 AM  90      10  10                         RI    
 
  2  5                               PH      CH    
 
                                    AR      AR    
 
 MG                                  MA      D     
 
                                    CY      W     
 
 TA                                             
 
 BL                         OF            
 
 ET                                   
 
                         CY      
 
                         NT      
 
                  HI      
 
                AN      
 
                A       
 
                       
 
                  
 
                  
 
                  
 
               
 
               
 
               
 
               
 
              
 
     00      03  05  1   15  25      EA  16  GI  Ac
 
     59      -2  -1      0.          ST  98  LB  ti
 
     10      9-  7-      00          SI  21  ER  ve
 
     38      20  20      0           DE      T     
 
     50      10  10                         [place]    
 
     1                               PH      HN    
 
                                     AR       W    
 
                                     MA            
 
                                     CY            
 
                                                
 
                            OF            
 
                                      
 
                           CY      
 
                         NT      
 
                  HI      
 
                AN      
 
                A    
 
                     
 
               
 
               
 
               
 
               
 
               
 
               
 
               
 
              
 
 AL  00      03  04  5   90  30      EA  16  AR  Ac
 
 RI  78      -1  -1      .0          ST  82  NO  ti
 
 AZ  11      7-  7-      00          SI  20  LD  ve
 
 OL  08      20  20                  DE           
 
 AM  90      10  10                         RI    
 
  2  5                               PH      CH    
 
                                    AR      AR    
 
 MG                                  MA      D     
 
                                    CY      W     
 
 TA                                             
 
 BL                         OF            
 
 ET                                   
 
                         CY      
 
                         NT      
 
                  HI      
 
                AN      
 
                A       
 
                       
 
                  
 
                  
 
                  
 
               
 
               
 
               
 
               
 
              
 
     59      10  04  11  8.  20      EA  14  AR  Ac
 
     31      -2  -1      50          ST  82  NO  ti
 
     00      3-  6-      0           SI  09  LD  ve
 
     57      20  20                  DE           
 
     92      09  10                         RI    
 
     0                               PH      CH    
 
                                     AR      AR    
 
                                     MA      D     
 
                                     CY      W     
 
                                                 
 
                            OF            
 
                                     
 
                         CY      
 
                         NT      
 
                  HI      
 
                AN      
 
                A       
 
                       
 
               
 
               
 
               
 
               
 
               
 
               
 
               
 
              
 
 TI  00      03  03  1   90  30      EA  16  GI  Ac
 
 ZA  18      -3  -3      .0          ST  99  LB  ti
 
 NI  54      0-  0-      00          SI  57  ER  ve
 
 DI  40      20  20                  DE      T     
 
 NE  02      10  10                         [place]    
 
    3                               PH      HN    
 
 HC                                  AR       W    
 
 L                                   MA            
 
 4                                   CY            
 
 MG                                             
 
                           OF            
 
 TA                                   
 
 BL                      CY      
 
 ET                      NT      
 
                  HI      
 
                AN      
 
                A    
 
                     
 
                  
 
                  
 
                  
 
                  
 
                  
 
               
 
               
 
              
 
     00      03  03  1   15  25      EA  16  GI  Ac
 
     59      -2  -2      0.          ST  98  LB  ti
 
     10      9-  9-      00          SI  21  ER  ve
 
     38      20  20      0           DE      T     
 
     50      10  10                         [place]    
 
     1                               PH      HN    
 
                                     AR       W    
 
                                     MA            
 
                                     CY            
 
                                                
 
                            OF            
 
                                      
 
                           CY      
 
                         NT      
 
                  HI      
 
                AN      
 
                A    
 
                     
 
               
 
               
 
               
 
               
 
               
 
               
 
               
 
              
 
 AL  00      03  03  5   90  30      EA  16  AR  Ac
 
 RI  78      -1  -1      .0          ST  82  NO  ti
 
 AZ  11      7-  7-      00          SI  20  LD  ve
 
 OL  08      20  20                  DE           
 
 AM  90      10  10                         RI    
 
  2  5                               PH      CH    
 
                                    AR      AR    
 
 MG                                  MA      D     
 
                                    CY      W     
 
 TA                                             
 
 BL                         OF            
 
 ET                                   
 
                         CY      
 
                         NT      
 
                  HI      
 
                AN      
 
                A       
 
                       
 
                  
 
                  
 
                  
 
               
 
               
 
               
 
               
 
              
 
 TI  00      03  03  0   60  30      EA  16  MI  Ac
 
 ZA  18      -0  -0      .0          ST  57  CK  ti
 
 NI  54      1-  1-      00          SI  54     ve
 
 DI  40      20  20                  DE      GR    
 
 NE  02      10  10                         EG    
 
    3                               PH      OR    
 
 HC                                  AR      Y     
 
 L                                   MA      E     
 
 4                                   CY            
 
 MG                                             
 
                           OF            
 
 TA                                   
 
 BL                      CY      
 
 ET                      NT      
 
                  HI      
 
                AN      
 
                A      
 
                     
 
                  
 
                  
 
                  
 
                  
 
                  
 
               
 
               
 
              
 
 LI  63      03  03  5   60  30      EA  16  MI  Ac
 
 DO  48      -0  -0      .0          ST  57  CK  ti
 
 DE  10      1-  1-      00          SI  56     ve
 
 RM  68      20  20                  DE      GR    
 
    70      10  10                         EG    
 
 5%  6                               PH      OR    
 
                                    AR      Y     
 
 PA                                  MA      E     
 
 TC                                  CY            
 
 H                                              
 
                            OF            
 
                                      
 
                         CY      
 
                         NT      
 
                  HI      
 
                AN      
 
                A      
 
                     
 
                 
 
               
 
               
 
               
 
               
 
               
 
               
 
              
 
 AL  67      02  02  00  90  30      EA  16  AR  Ac
 
 RI  25      -1  -2      .0          ST  43  NO  ti
 
 AZ  30      9-  6-      00          SI  25  LD  ve
 
 OL  90      20  20                  DE           
 
 AM  11      10  10                         RI    
 
    1                               PH      CH    
 
 0.                                  AR      AR    
 
 5                                   MA      D     
 
 MG                                  CY      W     
 
                                                
 
 TA                         OF            
 
 BL                         CY         
 
 ET                      NT      
 
                         HI      
 
                  AN      
 
                A       
 
                        
 
                       
 
                  
 
                  
 
                  
 
                  
 
               
 
               
 
              
 
     59      10  12  01  8.  15      EA  14  AR  Ac
 
     31      -2  -0      50          ST  82  NO  ti
 
     00      3-  3-      0           SI  09  LD  ve
 
     57      20  20                  DE           
 
     92      09  09                         RI    
 
     0                               PH      CH    
 
                                     AR      AR    
 
                                     MA      D     
 
                                     CY      W     
 
                                                 
 
                            OF            
 
                           CY         
 
                         NT      
 
                         HI      
 
                  AN      
 
                A       
 
                        
 
                       
 
               
 
               
 
               
 
               
 
               
 
               
 
              
 
     59      10  11  00  8.  15      EA  14  AR  Ac
 
     31      -2  -0      50          ST  82  NO  ti
 
     00      3-  5-      0           SI  09  LD  ve
 
     57      20  20                  DE           
 
     92      09  09                         RI    
 
     0                               PH      CH    
 
                                     AR      AR    
 
                                     MA      D     
 
                                     CY      W     
 
                                                 
 
                            OF            
 
                           CY         
 
                         NT      
 
                         HI      
 
                  AN      
 
                A       
 
                        
 
                       
 
               
 
               
 
               
 
               
 
               
 
               
 
              
 
 AZ  00      10  10  00  6.  6       EA  14  AR  Ac
 
 IT  09      -0  -2      00          ST  56  NO  ti
 
 HR  37      5-  2-      0           SI  82  LD  ve
 
 OM  14      20  20                  DE           
 
 YC  61      09  09                         RI    
 
 IN  8                               PH      CH    
 
                                    AR      AR    
 
 25                                  MA      D     
 
 0                                   CY      W     
 
 MG                                             
 
                           OF            
 
 TA                        CY         
 
 BL                      NT      
 
 ET                      HI      
 
                  AN      
 
                A       
 
                        
 
                       
 
                  
 
                  
 
                  
 
                  
 
                  
 
               
 
              
 
     00      10  10  00  60  20      EA  14  AR  Ac
 
     25      -0  -2      .0          ST  56  NO  ti
 
     83      5-  2-      00          SI  81  LD  ve
 
     67      20  20                  DE           
 
     80      09  09                         RI    
 
     1                               PH      CH    
 
                                     AR      AR    
 
                                     MA      D     
 
                                     CY      W     
 
                                                 
 
                            OF            
 
                            CY         
 
                         NT      
 
                         HI      
 
                  AN      
 
                A       
 
                        
 
                       
 
               
 
               
 
               
 
               
 
               
 
               
 
              
 
 IN  00      07  10  02  60  20      EA  13  MO  Ac
 
 DO  78      -1  -2      .0          ST  52  SE  ti
 
 ME  12      7-  2-      00          SI  24  S   ve
 
 TH  32      20  20                  DE      ST    
 
 AC  51      09  09                         EP    
 
 IN  0                               PH      HE    
 
                                    AR      N     
 
 25                                  MA      A     
 
                                    CY            
 
 MG                                              
 
                           OF            
 
 CA                         CY         
 
 PS                      NT      
 
 UL                      HI      
 
 E                AN      
 
                A       
 
                       
 
                     
 
                  
 
                  
 
                  
 
                  
 
                  
 
                 
 
              
 
 AM  00      09  10  00  15  5       EA  14  HE  Ac
 
 OX  78      -2  -0      .0          ST  43  ND  ti
 
 IC  12      5-  8-      00          SI  14  ER  ve
 
 IL  61      20  20                  DE      SO    
 
 LI  30      09  09                         N     
 
 N   5                               PH      RO    
 
 50                                  AR      BE    
 
 0                                   MA      RT    
 
 MG                                  CY       W    
 
                                               
 
 CA                         OF            
 
 PS                         CY         
 
 UL                      NT      
 
 E                       HI      
 
                  AN      
 
                A       
 
                        
 
                        
 
                  
 
                  
 
                  
 
                  
 
                 
 
               
 
              
 
     00      09  10  00  20  3       EA  14  HE  Ac
 
     05      -2  -0      .0          ST  43  ND  ti
 
     44      5-  8-      00          SI  13  ER  ve
 
     65      20  20                  DE      SO    
 
     02      09  09                         N     
 
     9                               PH      RO    
 
                                     AR      BE    
 
                                     MA      RT    
 
                                     CY       W    
 
                                                
 
                            OF            
 
                            CY         
 
                         NT      
 
                         HI      
 
                  AN      
 
                A       
 
                        
 
                        
 
               
 
               
 
               
 
               
 
               
 
               
 
              
 
 IN  00      07  09  01  60  20      EA  13  MO  Ac
 
 DO  78      -1  -1      .0          ST  52  SE  ti
 
 ME  12      7-  0-      00          SI  24  S   ve
 
 TH  32      20  20                  DE      ST    
 
 AC  51      09  09                         EP    
 
 IN  0                               PH      HE    
 
                                    AR      N     
 
 25                                  MA      A     
 
                                    CY            
 
 MG                                              
 
                           OF            
 
 CA                         CY         
 
 PS                      NT      
 
 UL                      HI      
 
 E                AN      
 
                A       
 
                       
 
                     
 
                  
 
                  
 
                  
 
                  
 
                  
 
                 
 
              
 
 IN  00      07  07  00  60  20      EA  13  MO  Ac
 
 DO  78      -1  -3      .0          ST  52  SE  ti
 
 ME  12      7-  0-      00          SI  24  S   ve
 
 TH  32      20  20                  DE      ST    
 
 AC  51      09  09                         EP    
 
 IN  0                               PH      HE    
 
                                    AR      N     
 
 25                                  MA      A     
 
                                    CY            
 
 MG                                              
 
                           OF            
 
 CA                         CY         
 
 PS                      NT      
 
 UL                      HI      
 
 E                AN      
 
                A       
 
                       
 
                     
 
                  
 
                  
 
                  
 
                  
 
                  
 
                 
 
              
 
 IN  00      06  07  01  30  10      EA  13  MO  Ac
 
 DO  78      -1  -1      .0          ST  15  SE  ti
 
 ME  12      6-  6-      00          SI  69  S   ve
 
 TH  32      20  20                  DE      ST    
 
 AC  51      09  09                         EP    
 
 IN  0                               PH      HE    
 
                                    AR      N     
 
 25                                  MA      A     
 
                                    CY            
 
 MG                                              
 
                           OF            
 
 CA                         CY         
 
 PS                      NT      
 
 UL                      HI      
 
 E                AN      
 
                A       
 
                       
 
                     
 
                  
 
                  
 
                  
 
                  
 
                  
 
                 
 
              
 
 IN  00      06  07  00  30  10      EA  13  MO  Ac
 
 DO  78      -1  -0      .0          ST  15  SE  ti
 
 ME  12      6-  2-      00          SI  69  S   ve
 
 TH  32      20  20                  DE      ST    
 
 AC  51      09  09                         EP    
 
 IN  0                               PH      HE    
 
                                    AR      N     
 
 25                                  MA      A     
 
                                    CY            
 
 MG                                              
 
                           OF            
 
 CA                         CY         
 
 PS                      NT      
 
 UL                      HI      
 
 E                AN      
 
                A       
 
                       
 
                     
 
                  
 
                  
 
                  
 
                  
 
                  
 
                 
 
              
 
     00      06  07  00  60  20      EA  13  MO  Ac
 
     55      -1  -0      .0          ST  15  SE  ti
 
     50      6-  2-      00          SI  70  S   ve
 
     16      20  20                  DE      ST    
 
     30      09  09                         EP    
 
     2                               PH      HE    
 
                                     AR      N     
 
                                     MA      A     
 
                                     CY            
 
                                                 
 
                            OF            
 
                            CY         
 
                         NT      
 
                         HI      
 
                  AN      
 
                A       
 
                       
 
                     
 
               
 
               
 
               
 
               
 
               
 
               
 
              
 
     00      06  06  00  15  3       EA  13  HE  Ac
 
     59      -0  -1      .0          ST  00  ND  ti
 
     10      4-  8-      00          SI  77  ER  ve
 
     38      20  20                  DE      SO    
 
     50      09  09                         N     
 
     1                               PH      RO    
 
                                     AR      BE    
 
                                     MA      RT    
 
                                     CY       W    
 
                                                
 
                            OF            
 
                            CY         
 
                         NT      
 
                         HI      
 
                  AN      
 
                A       
 
                        
 
                        
 
               
 
               
 
               
 
               
 
               
 
               
 
              
 
     00      05  05  00  20  3       EA  12  HE  Ac
 
     59      -0  -2      .0          ST  65  ND  ti
 
     10      7-  1-      00          SI  12  ER  ve
 
     38      20  20                  DE      SO    
 
     50      09  09                         N     
 
     1                               PH      RO    
 
                                     AR      BE    
 
                                     MA      RT    
 
                                     CY       W    
 
                                                
 
                            OF            
 
                            CY         
 
                         NT      
 
                         HI      
 
                  AN      
 
                A       
 
                        
 
                        
 
               
 
               
 
               
 
               
 
               
 
               
 
              
 
 RI  00      05  05  00  12  2       EA  12  HE  Ac
 
 OM  78      -0  -2      .0          ST  65  ND  ti
 
 ET  11      7-  1-      00          SI  13  ER  ve
 
 HA  83      20  20                  DE      SO    
 
 ZI  01      09  09                         N     
 
 NE  0                               PH      RO    
 
                                    AR      BE    
 
 25                                  MA      RT    
 
                                    CY       W    
 
 MG                                             
 
                           OF            
 
 TA                         CY         
 
 BL                      NT      
 
 ET                      HI      
 
                  AN      
 
                A       
 
                        
 
                        
 
                  
 
                  
 
                  
 
                  
 
                  
 
               
 
              
 
     00      05  05  00  15  3       EA  12  HE  Ac
 
     59      -0  -2      .0          ST  67  ND  ti
 
     10      8-  1-      00          SI  12  ER  ve
 
     54      20  20                  DE      SO    
 
     00      09  09                         N     
 
     1                               PH      RO    
 
                                     AR      BE    
 
                                     MA      RT    
 
                                     CY       W    
 
                                                
 
                            OF            
 
                            CY         
 
                         NT      
 
                         HI      
 
                  AN      
 
                A       
 
                        
 
                        
 
               
 
               
 
               
 
               
 
               
 
               
 
              
 
 AM  00      05  05  00  30  10      EA  12  ST  Ac
 
 OX  78      -0  -2      .0          ST  60  EP  ti
 
 IC  12      4-  1-      00          SI  38  HE  ve
 
 IL  61      20  20                  DE      NS    
 
 LI  30      09  09                              
 
 N   5                               PH      KE    
 
 50                                  AR      VI    
 
 0                                   MA      N     
 
 MG                                  CY      C     
 
                                                
 
 CA                         OF            
 
 PS                         CY         
 
 UL                      NT      
 
 E                       HI      
 
                  AN      
 
                A       
 
                        
 
                       
 
                  
 
                  
 
                  
 
                  
 
                 
 
               
 
              
 
     00      04  04  00  20  5       EA  12  ST  Ac
 
     17      -1  -2      .0          ST  31  EP  ti
 
     26      3-  3-      00          SI  07  HE  ve
 
     35      20  20                  DE      NS    
 
     97      09  09                              
 
     0                               PH      KE    
 
                                     AR      VI    
 
                                     MA      N     
 
                                     CY      C     
 
                                                
 
                            OF            
 
                            CY         
 
                         NT      
 
                         HI      
 
                  AN      
 
                A       
 
                        
 
                       
 
               
 
               
 
               
 
               
 
               
 
               
 
              
 
 AM  00      04  04  00  30  10      EA  12  ST  Ac
 
 OX  78      -0  -2      .0          ST  26  EP  ti
 
 IC  12      9-  3-      00          SI  87  HE  ve
 
 IL  61      20  20                  DE      NS    
 
 LI  30      09  09                              
 
 N   5                               PH      KE    
 
 50                                  AR      VI    
 
 0                                   MA      N     
 
 MG                                  CY      C     
 
                                                
 
 CA                         OF            
 
 PS                         CY         
 
 UL                      NT      
 
 E                       HI      
 
                  AN      
 
                A       
 
                        
 
                       
 
                  
 
                  
 
                  
 
                  
 
                 
 
               
 
              
 
     00      04  04  00  20  5       EA  12  ST  Ac
 
     60      -0  -2      .0          ST  26  EP  ti
 
     35      9-  3-      00          SI  86  HE  ve
 
     46      20  20                  DE      NS    
 
     82      09  09                              
 
     8                               PH      KE    
 
                                     AR      VI    
 
                                     MA      N     
 
                                     CY      C     
 
                                                
 
                            OF            
 
                            CY         
 
                         NT      
 
                         HI      
 
                  AN      
 
                A       
 
                        
 
                       
 
               
 
               
 
               
 
               
 
               
 
               
 
              
 
 CY  59      04  04  00  21  7       CL  19  WR  Ac
 
 CL  74      -1  -2      .0          IN  16  IG  ti
 
 OB  60      3-  3-      00          IC  91  HT  ve
 
 EN  17      20  20                              
 
 ZA  70      09  09                  PH      AR    
 
 RI  6                               AR      DY    
 
 IN                                  MA       C    
 
 E                                   CY            
 
 10                                                
 
                                                
 
 MG                                       
 
                                      
 
 TA                              
 
 BL                              
 
 ET                       
 
                        
 
                     
 
                  
 
               
 
               
 
               
 
               
 
               
 
               
 
     00      04  04  00  21  7       CL  19  WR  Ac
 
     55      -1  -2      .0          IN  16  IG  ti
 
     51      3-  3-      00          IC  92  HT  ve
 
     88      20  20                              
 
     30      09  09                  PH      AR    
 
     2                               AR      DY    
 
                                     MA       C    
 
                                     CY            
 
                                                   
 
                                                 
 
                                          
 
                                       
 
                                 
 
                                 
 
                          
 
                        
 
                     
 
                  
 
     68      04  04  00  20  10      CL  19  WR  Ac
 
     77      -1  -2      .0          IN  16  IG  ti
 
     40      3-  3-      00          IC  90  HT  ve
 
     12      20  20                              
 
     26      09  09                  PH      AR    
 
     0                               AR      DY    
 
                                     MA       C    
 
                                     CY            
 
                                                   
 
                                                  
 
                                          
 
                                       
 
                                 
 
                                 
 
                          
 
                        
 
                     
 
                  
 
                                                                                
                                                                                
                                                                                
                                                                                
                                                                                
                                                                                
  
 
Procedures
                      
 
 
 Procedure  DOS        Code       Location   Performer  Comment  
 
                                                                 
 
                                                 
 
 INCISION     61435      [facility]   RENUSCH             
 
 &   7                     PHYSICIAN                     
 
 DRAINAGE                     S, PLLC            
 
 ABSCESS                  
 
 COMPLICAT             
 
 ED/MULTIP     
 
 LE            
 
               
 
        
 
 HEPATITIS    74601      [facility]            
 
  C   5                     [facility]           
 
 ANTIBODY                     INC        INC       
 
                                     
 
                      
 
 CULTURE     40228      [facility]            
 
 BACTERIAL  5                     [facility]           
 
  BLOOD                     INC        INC       
 
 AEROBIC                            
 
 W/ID              
 
 ISOLATES      
 
               
 
              
 
 BLOOD     51118      [facility]            
 
 COUNT   5                     [facility]           
 
 COMPLETE                     INC        INC       
 
 AUTO&AUTO                           
 
  DIFRNTL              
 
 WBC           
 
               
 
         
 
 IV     36233      [facility]            
 
 INFUSION   5                     [facility]           
 
 THERAPY/P                    INC        INC       
 
 ROPHYLAXI                           
 
 S /DX 1ST             
 
  TO 1 HR      
 
               
 
              
 
 HEPATITIS    87989      [facility] MELENDREZ CORE   5                     [facility]           
 
 ANTIBODY                     INC        INC       
 
 HBCAB                            
 
 TOTAL                 
 
               
 
           
 
 HEPATITIS    02436      [facility] MELENDREZ SURF   5                     [facility]           
 
 ANTIBODY                     INC        INC       
 
 HBSAB                               
 
                       
 
           
 
 [place]     [address]      [facility]            
 
 HEPATITIS  5                     [facility]           
 
  B                     INC        INC       
 
 SURFACE                            
 
 ANTIGEN               
 
               
 
             
 
 [place]     [address]      [facility]            
 
 LACTATE    5                     [facility]           
 
                              INC        INC       
 
                                   
 
             
 
 HEPATITIS    15762      [facility]            
 
  A   5                     [facility]           
 
 ANTIBODY                     INC        INC       
 
 HAAB                                
 
                       
 
          
 
 COMPREHEN    94429      [facility]            
 
 SIVE   5                     [facility]           
 
 METABOLIC                    INC        INC       
 
  PANEL                              
 
                       
 
            
 
 COMPREHEN  10-  66943      [facility]            
 
 SIVE   5                     [facility]           
 
 METABOLIC                    INC        INC       
 
  PANEL                              
 
                       
 
            
 
 COLLECTIO  10-  06609      [facility]            
 
 N VENOUS   5                     [facility]           
 
 BLOOD                     INC        INC       
 
 VENIPUNCT                           
 
 URE                   
 
               
 
         
 
 [place]   10-  [address]      [facility]            
 
 THYROXINE  5                     [facility]           
 
  TOTAL                       INC        INC       
 
                                     
 
                    
 
 [place]   10-  [address]      [facility]            
 
 THYROID   5                     [facility]           
 
 STIMULATI                    INC        INC       
 
 NG                            
 
 HORMONE              
 
 TSH           
 
               
 
         
 
 BLOOD   10-  58493      [facility]            
 
 COUNT   5                     [facility]           
 
 COMPLETE                     INC        INC       
 
 AUTO&AUTO                           
 
  DIFRNTL              
 
 WBC           
 
               
 
         
 
 HEMOGLOBI  10-  89918      [facility]            
 
 N   5                     [facility]           
 
 GLYCOSYLA                    INC        INC       
 
 EVELINE A1C                             
 
                       
 
             
 
 BLOOD     31595      [facility]            
 
 COUNT   5                     [facility]           
 
 COMPLETE                     INC        INC       
 
 AUTO&AUTO                           
 
  DIFRNTL              
 
 WBC           
 
               
 
         
 
 HEPATITIS    42937      [facility]   [facility]            
 
  C   5                     [facility]           
 
 ANTIBODY                     INC        INC       
 
                                     
 
                      
 
 [place]     [address]      [facility]            
 
 HEPATITIS  5                     [facility]           
 
  B                     INC        INC       
 
 SURFACE                            
 
 ANTIGEN               
 
               
 
             
 
 HEPATITIS    13435      [facility]            
 
  B SURF   5                     [facility]           
 
 ANTIBODY                     INC        INC       
 
 HBSAB                               
 
                       
 
           
 
 PROTHROMB    35645      [facility]            
 
 IN TIME    5                     [facility]           
 
                              INC        INC       
 
                                   
 
             
 
 HEPATITIS    01798      [facility]            
 
  B CORE   5                     [facility]           
 
 ANTIBODY                     INC        INC       
 
 HBCAB                            
 
 TOTAL                 
 
               
 
           
 
 COLLECTIO    56531      [facility]            
 
 N VENOUS   5                     [facility]           
 
 BLOOD                     INC        INC       
 
 VENIPUNCT                           
 
 URE                   
 
               
 
         
 
 [place]     [address]      [facility]            
 
 THYROID   5                     [facility]           
 
 STIMULATI                    INC        INC       
 
 NG                            
 
 HORMONE              
 
 TSH           
 
               
 
         
 
 [place]     [address]      [facility]            
 
 AMMONIA    5                     [facility]           
 
                              INC        INC       
 
                                   
 
             
 
 THROMBOPL    80150      [facility]            
 
 ASTIN   5                     [facility]           
 
 TIME                     INC        INC       
 
 PARTIAL                            
 
 PLASMA/WH             
 
 OLE BLOOD     
 
               
 
               
 
 HEPATITIS    52726      [facility]            
 
  A   5                     [facility]           
 
 ANTIBODY                     INC        INC       
 
 HAAB                                
 
                       
 
          
 
 INF AGT           [facility]            
 
 AB DETECT  5                     [facility]           
 
  EIA TECH                    INC        INC       
 
                             
 
 HIV-1&/HI             
 
 V-2 SCR       
 
               
 
             
 
 COMPREHEN    37902      [facility]            
 
 SIVE   5                     [facility]           
 
 METABOLIC                    INC        INC       
 
  PANEL                              
 
                       
 
            
 
 [place]     [address]      [facility]            
 
 THYROXINE  5                     [facility]           
 
  TOTAL                       INC        INC       
 
                                     
 
                    
 
 RADEX     25938      [facility] ALL            
 
 SPINE   5                     MEDICAL                      
 
 LUMBOSACR                    IMAGING            
 
 AL 2/3       ASS        
 
 VIEWS                   
 
                   
 
           
 
 CT     [address]      [place]   BOND ALL            
 
 HEAD/BRAI  5                     MEDICAL                      
 
 N W/O                     IMAGING            
 
 CONTRAST       ASS        
 
 MATERIAL                
 
                   
 
              
 
 RADIOLOGI    32671      [facility] ALL            
 
 C EXAM   5                     MEDICAL                      
 
 CHEST 2                     IMAGING            
 
 VIEWS       ASS        
 
 FRONTAL&L               
 
 ATERAL            
 
               
 
            
 
 INITIAL     64066      VÍCTOR RENEE SADI            
 
 INPATIENT  4                                                   
 
  CONSULT                                        
 
 NEW/ESTAB       
 
  PT 20      
 
 MIN           
 
               
 
         
 
 [place]     [address]      RAJESH   RAJESH            
 
 ABDOMEN &  4                     PAMELA        PAMELA                
 
  PELVIS                                          
 
 W/O                
 
 CONTRAST      
 
 MATERIAL      
 
               
 
              
 
 OPHTH     44709      FISH ARNOLD,            
 
 MEDICAL   0                     VISION     GENA M           
 
 XM&EVAL                                          
 
 COMPRHNSV                       
 
  ESTAB PT     
 
  1/>          
 
               
 
          
 
 TENS           [employer]   [employer]            
 
 DEVICE   0                                                   
 
 4/MORE                                        
 
 LEADS        
 
 MULTI      
 
 NERVE      
 
 STIMULATI     
 
 ON            
 
               
 
        
 
 APPLICATI    30762      [facility]            
 
 ON   0                     [facility]           
 
 MODALITY                     INC        INC       
 
 1/> AREAS                           
 
  HOT/COLD             
 
  PACKS        
 
               
 
            
 
 APPL     38045      [facility]            
 
 MODALITY   0                     [facility]           
 
 1/> AREAS                    INC        INC       
 
  ELEC                            
 
 STIMJ              
 
 UNATTENDE     
 
 D             
 
               
 
       
 
 THERAPEUT    56249      [facility]            
 
 IC PX 1/>  0                     [facility]           
 
  AREAS                     INC        INC       
 
 EACH 15                            
 
 MIN              
 
 EXERCISES     
 
               
 
               
 
 APPL     73906      [facility]            
 
 MODALITY   0                     [facility]           
 
 1/> AREAS                    INC        INC       
 
  ELEC                            
 
 STIMJ EA              
 
 15 MIN        
 
               
 
            
 
 THERAPEUT    01374      [facility]            
 
 IC PX 1/>  0                     [facility]           
 
  AREAS                     INC        INC       
 
 EACH 15                            
 
 MIN              
 
 EXERCISES     
 
               
 
               
 
 APPLICATI    96802      [facility]            
 
 ON   0                     [facility]           
 
 MODALITY                     INC        INC       
 
 1/> AREAS                           
 
  HOT/COLD             
 
  PACKS        
 
               
 
            
 
 APPL     98487      [facility]            
 
 MODALITY   0                     [facility]           
 
 1/> AREAS                    INC        INC       
 
  ELEC                            
 
 STIMJ              
 
 UNATTENDE     
 
 D             
 
               
 
       
 
 APPLICATI    13436      [facility]            
 
 ON   0                     [facility]           
 
 MODALITY                     INC        INC       
 
 1/> AREAS                           
 
  HOT/COLD             
 
  PACKS        
 
               
 
            
 
 APPL     25834      [facility]            
 
 MODALITY   0                     [facility]           
 
 1/> AREAS                    INC        INC       
 
  ELEC                            
 
 STIMJ              
 
 UNATTENDE     
 
 D             
 
               
 
       
 
 THERAPEUT    20317      [facility]            
 
 IC PX 1/>  0                     [facility]           
 
  AREAS                     INC        INC       
 
 EACH 15                            
 
 MIN              
 
 EXERCISES     
 
               
 
               
 
 PHYSICAL     55647      [facility]            
 
 THERAPY   0                     [facility]           
 
 EVALUATIO                    INC        INC       
 
 N                                   
 
                       
 
       
 
 PSYCHOLOG    62587      PHYSICIAN  VERNON THOMAS   0                     S   TASH BOYKIN          
 
 TESTING                     SERVICES             
 
 ADMN BY       [facility]                  
 
 TECH RI                
 
 HR                
 
               
 
        
 
 RADEX     84318      [place]   NETO,            
 
 SPINE   0                     MEDICAL   SHELLY P           
 
 LUMBOSACR                    IMAGING             
 
 AL       ASSOCIATE           
 
 MINIMUM 4    S          
 
  VIEWS                  
 
                 
 
            
 
 LEVEL III  10-  02506      ORAL   ORAL            
 
  SURG   9                     PATHOLOGY  PATHOLOGY          
 
 PATHOLOGY                         
 
        LABORATOR  LABORATOR 
 
 GROSS&ABNER    Y          Y         
 
 ROSCOPIC                          
 
 EXAM              
 
               
 
          
 
 DEEP           SEBAS MULLEN           
 
 SEDATION/  DANIEL Ivy [facility] W W         
 
 ANESTHESI                           
 
 A-1ST 30          
 
 MINUTES       
 
               
 
             
 
 THER     10737      SEBAS MULLEN           
 
 PROPH/DX   9                     DANIEL ROBERT           
 
 NJX IV                     W          W         
 
 PUSH                            
 
 SINGLE/1S         
 
 T      
 
 SBST/DRUG     
 
               
 
               
 
 BIOPSY OF    93982      SEBAS MULLEN           
 
  LIP       9                     , DANIEL SANCHEZ         
 
                                
 
         
 
 ORTHOPANT    48880      SEBAS MULLEN           
 
 OGRAM      9                     , DANIEL SANCHEZ         
 
                                 
 
         
 
 3D     15118      REYNA MENA,           
 
 RENDERING  9                      RAJESH ORELLANA           
 
  W/INTERP                                         
 
  &                           
 
 POSTPROCE     
 
 SS      
 
 SUPERVISI     
 
 ON            
 
               
 
        
 
 MRI     19722      REYNA C  RAJESH,           
 
 SPINAL   9                      RAJESH   REYNA           
 
 CANAL                                          
 
 LUMBAR                           
 
 W/O      
 
 CONTRAST      
 
 MATERIAL      
 
               
 
              
 
 RADEX     52587      [facility]   [facility]            
 
 SPINE   9                     [facility]           
 
 LUMBOSACR                    INC        INC       
 
 AL                            
 
 MINIMUM 4             
 
  VIEWS        
 
               
 
            
 
                                                                                
                                                                                
                                                                                
                                                                                
                                                                                
                                                                                
                                                                                
                                                                                
                              
 
Encounters
                      
 
 
 Encounter  Start   End Date   Code       Location   Performer
 
  Type      Date                                                 
 
                                                        
 
                
 
 EMERGENCY    69398      [facility] RAMESH  
 
    7          7                     PHYSICIAN           
 
 DEPARTMEN                               S, [facility]         
 
 T VISIT                    
 
 HIGH/URGE             
 
 NT      
 
 SEVERITY      
 
               
 
              
 
 HOSPITAL                [facility]            
 
 -   5          5                     [facility]            
 
 OUTPATIEN                                   INC                 
 
 T                                             
 
                   
 
       
 
 EMERGENCY    81753      [facility]            
 
    5          5                     [facility]                 
 
 T VISIT                            
 
 MODERATE          
 
 SEVERITY      
 
               
 
              
 
 EMERGENCY    68862      KAVITHA ROMERO 
 
    5          5                     PHYSICIAN  [facility]      
 
 DEPARTMEN                               [facility]             
 
 T VISIT                      
 
 HIGH/URGE             
 
 NT      
 
 SEVERITY      
 
               
 
              
 
 HOSPITAL   10-  10-             [facility]            
 
 -   5          5                     [facility]            
 
 OUTPATIEN                                   INC                 
 
 T                                             
 
                   
 
       
 
 OFFICE     74036      [facility]   HEATHER 
 
 OUTPATITRENTON  5          5                     PHYSICIAN  [facility]      
 
 T VISIT                                S GROUP             
 
 15                      
 
 MINUTES               
 
               
 
             
 
 HOSPITAL                [facility]            
 
 -   5          5                     [facility]            
 
 OUTPATIEN                                   INC                 
 
 T                                             
 
                   
 
       
 
 OFFICE     97266      JOYCE COOK OUTPATIEN  0          0                     V[facility]IC[facility] COOPER
 
 T VISIT                                                    
 
 15                            
 
 MINUTES       
 
               
 
             
 
 OFFICE     31296      JOYCE COOK OUTPATIEN  0          0                     [facility]
 
 T VISIT                                                    
 
 15                            
 
 MINUTES       
 
               
 
             
 
 OFFICE     98952      PHYSICIAN  VISHNU CRUZ  0          0                     [facility] COOPER   
 
 T VISIT                                SERVICES            
 
 25          PSC            
 
 MINUTES                 
 
                   
 
             
 
 OFFICE     98532      JOYCE COOK OUTPATIEN  0          0                     VINICIO COOPER
 
 T VISIT                                                    
 
 15                            
 
 MINUTES       
 
               
 
             
 
 HOSPITAL                [facility]            
 
 -   0          0                     [facility]            
 
 OUTPATIEN                                   INC                 
 
 T                                             
 
                   
 
       
 
 OFFICE     13224      VISHNU MACIEL  0          0                     S   [facility] [place] [facility]                                SERVICES            
 
 MINUTES           [facility]               
 
                         
 
                 
 
 HOSPITAL                [facility]            
 
 -   0          0                     [facility]            
 
 OUTPATIEN                                   INC                 
 
 T                                             
 
                   
 
       
 
 EMERGENCY    26913      [facility]            
 
    0          0                     [facility]            
 
 DEPARTMEN                               INC                 
 
 T VISIT                            
 
 LOW/MODER         
 
  SEVERITY     
 
               
 
               
 
 EMERGENCY    97275      MERLE   HEATHER, 
 
    0          0                     EMERGENCY  [facility]
 
 [facility]                                [facility]           
 
 T VISIT                    
 
 HIGH/URGE     ASSOCIATE 
 
 NT      S         
 
 SEVERITY                
 
                 
 
              
 
 OFFICE   10-  10-  36228      JOYCE COOK, 
 
 OUTPATIEN  9          9                     [facility]ICIO COOPER  [facility] COOPER
 
 T NEW 30                                                    
 
 MINUTES                             
 
               
 
             
 
 OFFICE     29962      [place]   CONSUELO, 
 
 CONSULTAT  9          9                     MEDICAL   Á[facility]O ARELLANO                                SERV            
 
 NEW/ESTAB         FOUNDATIO         
 
  PATIENT                
 
 40 MIN                  
 
               
 
            
 
 OFFICE     92922      [facility]A REYES  9          9                     [facility]       
 
 T VISIT                                 PSC                
 
 15                    
 
 MINUTES            
 
               
 
             
 
 OFFICE     59592      ASA REYESPATITRENTON  9          9                     [facility]       
 
 T VISIT                                 PSC                
 
 15                    
 
 MINUTES            
 
               
 
             
 
 HOSPITAL                [facility]            
 
 -   9          9                     [facility]            
 
 OUTPATIEN                                   INC                 
 
 T                                             
 
                   
 
       
 
 OFFICE     82645      ASA REYESPATITRENTON  9          9                     [facility] VISIT                                 PSC                
 
 15                    
 
 MINUTES            
 
               
 
             
 
 [facility]                [facility]            
 
 -   9          9                     [facility]            
 
 OUTPATIEN                                   INC                 
 
 T                                             
 
                   
 
       
 
 OFFICE     15737      ASA REYES
 
 OUTPATITRENTON  9          9                     [facility]       
 
 T VISIT                                 PSC                
 
 15                    
 
 MINUTES

## 2017-10-07 NOTE — EXTERNAL MEDICAL SUMMARY RPT
[facility] Patient Summary
 Created on: 10/04/2017
 
BERYL MANN
External Reference #: [id number]
: 68
Sex: Female
 
Demographics
 
 
 
 Address  [address]  [address]
 
 Home Phone  [phone]
 
 Preferred Language  Unknown
 
 Marital Status  Unknown
 
 [employer] Affiliation  Unknown
 
 Race  Unknown
 
 Ethnic Group  Unknown
 
 
Author
 
 
 
 Author  [facility]IR Andrade, [employer] 
 
 Organization  [employer]
 
 Address  Unknown
 
 Phone  Unavailable
 
 
 
Results
 
 
 
 Vancomycin [Mass/volume] in Serum or Plasma --trough
 
 
 
 
 Observa  Value  Referen  Units  Interpr  Notes  Date
 
 tion   ce    etation  
 
   Range    
 
 
 
 
 Vancomyci  10.0 -   mcg/mL  Normal  No   Sep 3 
 
 n   20.0    informati  2017 
 
 [Mass/vol     on in   12:45 PM
 
 ume] in      source  
 
 Serum or      data 
 
 Plasma      
 
 --trough     
 
 
 
 
 Choriogonadotropin.beta subunit [Units] in 24 hour Urine
 
 
 
 
 Observa  Value  Referen  Units  Interpr  Notes  Date
 
 tion   ce    etation  
 
   Range    
 
 
 
 
 Choriogon  NEG  No   No   No   Sep 2 
 
 adotropin   informati  informati  informati  2017 
 
 .beta    on in   on in   on in   10:00 AM
 
 subunit    source   source   source  
 
 [Units]    data  data  data 
 
 in 24      
 
 hour      
 
 Urine     
 
 
 
 
 Basic metabolic panel in Blood
 
 
 
 
 Observa  Value  Referen  Units  Interpr  Notes  Date
 
 tion   ce    etation  
 
   Range    
 
 
 
 
 Urea   7 - 18  mg/dL  Low  No   Sep 2 
 
 nitrogen      informati  2017 6:25
 
 [Mass/vol     on in    AM
 
 ume] in      source  
 
 Serum or      data 
 
 Plasma     
 
 Calcium   8.5 -   mg/dL  Low  No   Sep 2 
 
 [Mass/vol  10.1    informati  2017 6:25
 
 ume] in      on in    AM
 
 Serum or      source  
 
 Plasma     data 
 
 Chloride   98 - 107  mmoL/L  Normal  No   Sep 2 
 
 [Moles/vo     informati  2017 6:25
 
 lume] in      on in    AM
 
 Serum or      source  
 
 Plasma     data 
 
 Carbon   21.0 -   mmoL/L  Normal  No   Sep 2 
 
 dioxide,   32.0    informati  2017 6:25
 
 total      on in    AM
 
 [Moles/vo     source  
 
 lume] in      data 
 
 Serum or      
 
 Plasma     
 
 Creatinin  0.55 -   mg/dL  Normal  No   Sep 2 
 
 e   1.02    informati  2017 6:25
 
 [Mass/vol     on in    AM
 
 ume] in      source  
 
 Serum or      data 
 
 Plasma     
 
 Creatinin  50 - 200  ML/MIN  Normal  No   Sep 2 
 
 e renal      informati  2017 6:25
 
 clearance     on in    AM
 
       source  
 
 predicted     data 
 
  by      
 
 Cockcroft     
 
 -Gault      
 
 formula     
 
 Estimated  59-  ML/MIN  No   REFERENCE  Sep 2 
 
      informati   RANGE:   2017 6:25
 
 glomerula    on in   >60    AM
 
 r     source   ML/MIN/1. 
 
 filtratio    data  73 SQUARE 
 
 n rate       METERSIf 
 
 (GF      this  
 
     patient  
 
     is  
 
     -A 
 
     merican,  
 
     then  
 
     multiply  
 
     theresult 
 
      by  
 
     1.210. 
 
 Glucose   74 - 106  mg/dL  Normal  No   Sep 2 
 
 [Mass/vol     informati  2017 6:25
 
 ume] in      on in    AM
 
 Serum or      source  
 
 Plasma     data 
 
 Potassium  3.5 - 5.1  mmoL/L  Normal  No   Sep 2 
 
       informati  2017 6:25
 
 [Moles/vo     on in    AM
 
 lume] in      source  
 
 Serum or      data 
 
 Plasma     
 
 Sodium   136 - 145  mmoL/L  Normal  No   Sep 2 
 
 [Moles/vo     informati  2017 6:25
 
 lume] in      on in    AM
 
 Serum or      source  
 
 Plasma     data 
 
 
 
 
 CBC W Auto Differential panel in Blood
 
 
 
 
 Observa  Value  Referen  Units  Interpr  Notes  Date
 
 tion   ce    etation  
 
   Range    
 
 
 
 
 Basophils  0 - 0.2  K/MM3  Normal  No   Sep 2 
 
       informati  2017 6:25
 
 [#/volume     on in    AM
 
 ] in      source  
 
 Blood by      data 
 
 Automated     
 
  count     
 
 Basophils  0.1 - 2.0  %  Normal  No   Sep 2 
 
 /100      informati  2017 6:25
 
 leukocyte     on in    AM
 
 s in      source  
 
 Blood by      data 
 
 Automated     
 
  count     
 
 Eosinophi  0.0 - 0.4  K/mm3  Normal  No   Sep 2 
 
 ls      informati  2017 6:25
 
 [#/volume     on in    AM
 
 ] in      source  
 
 Blood by      data 
 
 Automated     
 
  count     
 
 Eosinophi  0.1 -   %  Normal  No   Sep 2 
 
 ls/100   12.0    informati  2017 6:25
 
 leukocyte     on in    AM
 
 s in      source  
 
 Blood by      data 
 
 Automated     
 
  count     
 
 Granulocy  1.8 - 7.8  K/mm3  Normal  No   Sep 2 
 
 karen      informati  2017 6:25
 
 [#/volume     on in    AM
 
 ] in      source  
 
 Blood by      data 
 
 Automated     
 
  count     
 
 Granulocy  37.0 -   %  Normal  No   Sep 2 
 
 karen/100   80.0    informati  2017 6:25
 
 leukocyte     on in    AM
 
 s in      source  
 
 Blood by      data 
 
 Automated     
 
  count     
 
 Hematocri  37.0 -   %  Low  No   Sep 2 
 
 t [Volume  47.0    informati  2017 6:25
 
       on in    AM
 
 Fraction]     source  
 
  of Blood     data 
 
 Hemoglobi  12.2 -   g/dL  Low  No   Sep 2 
 
 n   16.2    informati  2017 6:25
 
 [Mass/vol     on in    AM
 
 ume] in      source  
 
 Blood     data 
 
 Lymphocyt  0.7 - 4.5  K/mm3  Normal  No   Sep 2 
 
 es      informati   6:25
 
 [#/volume     on in    AM
 
 ] in      source  
 
 Unspecifi     data 
 
 ed      
 
 specimen      
 
 by      
 
 Automated     
 
  count     
 
 Lymphocyt  10 - 50.0  %  Normal  No   Sep 2 
 
 es      informati  2017 6:25
 
 [#/volume     on in    AM
 
 ] in      source  
 
 Unspecifi     data 
 
 ed      
 
 specimen      
 
 by      
 
 Automated     
 
  count     
 
 Erythrocy  27 - 31.2  pg  Normal  No   Sep 2 
 
 te mean      informati  2017 6:25
 
 corpuscul     on in    AM
 
 ar      source  
 
 hemoglobi     data 
 
 n      
 
 [Entitic      
 
 mass]     
 
 Erythrocy  31.8 -   g/dl  Normal  No   Sep 2 
 
 te mean   35.4    informati  2017 6:25
 
 corpuscul     on in    AM
 
 ar      source  
 
 hemoglobi     data 
 
 n      
 
 concentra     
 
 tion      
 
 [Mass/vol     
 
 ume] by      
 
 Automated     
 
  count     
 
 Erythrocy  82.2 -   fl  Normal  No   Sep 2 
 
 te mean   97.8    informati  2017 6:25
 
 corpuscul     on in    AM
 
 ar volume     source  
 
  [Entitic     data 
 
  volume]      
 
 by      
 
 Automated     
 
  count     
 
 Monocytes  0.1 - 1.0  K/mm3  Normal  No   Sep 2 
 
       informati  2017 6:25
 
 [#/volume     on in    AM
 
 ] in      source  
 
 Blood by      data 
 
 Automated     
 
  count     
 
 Monocytes  1.7 - 9.3  %  High  No   Sep 2 
 
 /100      informati  2017 6:25
 
 leukocyte     on in    AM
 
 s in      source  
 
 Blood by      data 
 
 Automated     
 
  count     
 
 Platelet   7.4 -   fl  Normal  No   Sep 2 
 
 mean   10.4    informati  2017 6:25
 
 volume      on in    AM
 
 [Entitic      source  
 
 volume]      data 
 
 in Blood      
 
 by      
 
 Automated     
 
  count     
 
 Platelets  142 - 424  K/mm3  Normal  No   Sep 2 
 
       informati  2017 6:25
 
 [#/volume     on in    AM
 
 ] in      source  
 
 Blood     data 
 
 Erythrocy  4.2 - 5.4  M/mm3  Low  No   Sep 2 
 
 karen      informati  2017 6:25
 
 [#/volume     on in    AM
 
 ] in      source  
 
 Amniotic      data 
 
 fluid     
 
 Erythrocy  11.5 -   %  Normal  No   Sep 2 
 
 te   17.5    informati  2017 6:25
 
 distribut     on in    AM
 
 ion width     source  
 
  [Entitic     data 
 
  volume]      
 
 by      
 
 Automated     
 
  count     
 
 Leukocyte  4.8 -   K/MM3  No   No   Sep 2 
 
 s   10.8   informati  informati  2017 6:25
 
 [#/volume    on in   on in    AM
 
 ] in     source   source  
 
 Blood    data  data 
 
 
 
 
 CRP
 
 
 
 
 Observa  Value  Referen  Units  Interpr  Notes  Date
 
 tion   ce    etation  
 
   Range    
 
 
 
 
 CRP  0.0 - 0.9  MG/DL  High  No   Sep 1 
 
     informati  2017 6:40
 
     on in    PM
 
     source  
 
     data 
 
 
 
 
 Lactate [Moles/volume] in Blood
 
 
 
 
 Observa  Value  Referen  Units  Interpr  Notes  Date
 
 tion   ce    etation  
 
   Range    
 
 
 
 
 Lactate   0.4 - 2.0  mmol/L  Normal  No   Sep 1 
 
 [Moles/vo     informati  2017 6:40
 
 lume] in      on in    PM
 
 Blood     source  
 
     data 
 
 
 
 
 Comprehensive metabolic 2000 panel in Serum or Plasma
 
 
 
 
 Observa  Value  Referen  Units  Interpr  Notes  Date
 
 tion   ce    etation  
 
   Range    
 
 
 
 
 Albumin/G  1.1 - 1.8  No   Low  No   Sep 1 
 
 lobulin    informati   informati  2017 6:40
 
 [Mass    on in    on in    PM
 
 ratio] in   source    source  
 
  Serum or   data   data 
 
  Plasma     
 
 Albumin   3.4 - 5.0  gm/dL  Low  No   Sep 1 
 
 [Mass/vol     informati  2017 6:40
 
 ume] in      on in    PM
 
 Serum or      source  
 
 Plasma     data 
 
 Alkaline   46 - 116  U/L  High  No   Sep 1 
 
 phosphata     informati  2017 6:40
 
 se      on in    PM
 
 [Enzymati     source  
 
 c      data 
 
 activity/     
 
 volume]      
 
 in Serum      
 
 or Plasma     
 
 Bilirubin  0.2 - 1.0  mg/dL  Normal  No   Sep 1 
 
 .total      informati  2017 6:40
 
 [Mass/vol     on in    PM
 
 ume] in      source  
 
 Serum or      data 
 
 Plasma     
 
 Urea   7 - 18  mg/dL  Low  No   Sep 1 
 
 nitrogen      informati  2017 6:40
 
 [Mass/vol     on in    PM
 
 ume] in      source  
 
 Serum or      data 
 
 Plasma     
 
 Calcium   8.5 -   mg/dL  Normal  No   Sep 1 
 
 [Mass/vol  10.1    informati  2017 6:40
 
 ume] in      on in    PM
 
 Serum or      source  
 
 Plasma     data 
 
 Chloride   98 - 107  mmoL/L  Low  No   Sep 1 
 
 [Moles/vo     informati  2017 6:40
 
 lume] in      on in    PM
 
 Serum or      source  
 
 Plasma     data 
 
 Carbon   21.0 -   mmoL/L  Normal  No   Sep 1 
 
 dioxide,   32.0    informati  2017 6:40
 
 total      on in    PM
 
 [Moles/vo     source  
 
 lume] in      data 
 
 Serum or      
 
 Plasma     
 
 Creatinin  0.55 -   mg/dL  Normal  No   Sep 1 
 
 e   1.02    informati  2017 6:40
 
 [Mass/vol     on in    PM
 
 ume] in      source  
 
 Serum or      data 
 
 Plasma     
 
 Creatinin  50 - 200  ML/MIN  Normal  No   Sep 1 
 
 e renal      informati  2017 6:40
 
 clearance     on in    PM
 
       source  
 
 predicted     data 
 
  by      
 
 Cockcroft     
 
 -Gault      
 
 formula     
 
 Estimated  59-  ML/MIN  No   REFERENCE  Sep 1 
 
      informati   RANGE:   2017 6:40
 
 glomerula    on in   >60    PM
 
 r     source   ML/MIN/1. 
 
 filtratio    data  73 SQUARE 
 
 n rate       METERSIf 
 
 (GF      this  
 
     patient  
 
     is  
 
     -A 
 
     merican,  
 
     then  
 
     multiply  
 
     theresult 
 
      by  
 
     1.210. 
 
 Globulin   1.3 - 3.2  gm/dL  High  No   Sep 1 
 
 [Mass/vol     informati  2017 6:40
 
 ume] in      on in    PM
 
 Serum     source  
 
     data 
 
 Glucose   74 - 106  mg/dL  High  No   Sep 1 
 
 [Mass/vol     informati  2017 6:40
 
 ume] in      on in    PM
 
 Serum or      source  
 
 Plasma     data 
 
 Potassium  3.5 - 5.1  mmoL/L  Low  No   Sep 1 
 
       informati  2017 6:40
 
 [Moles/vo     on in    PM
 
 lume] in      source  
 
 Serum or      data 
 
 Plasma     
 
 Sodium   136 - 145  mmoL/L  Low  No   Sep 1 
 
 [Moles/vo     informati  2017 6:40
 
 lume] in      on in    PM
 
 Serum or      source  
 
 Plasma     data 
 
 Aspartate  15 - 37  U/L  Normal  No   Sep 1 
 
       informati  2017 6:40
 
 aminotran     on in    PM
 
 sferase      source  
 
 [Enzymati     data 
 
 c      
 
 activity/     
 
 volume]      
 
 in Serum      
 
 or Plasma     
 
 Alanine   12 - 78  U/L  Normal  No   Sep 1 
 
 aminotran     informati  2017 6:40
 
 sferase      on in    PM
 
 [Enzymati     source  
 
 c      data 
 
 activity/     
 
 volume]      
 
 in Serum      
 
 or Plasma     
 
 Protein   6.4 - 8.2  gm/dL  High  No   Sep 1 
 
 [Mass/vol     informati   6:40
 
 ume] in      on in    PM
 
 Serum or      source  
 
 Plasma     data 
 
 
 
 
 CBC W Auto Differential panel in Blood
 
 
 
 
 Observa  Value  Referen  Units  Interpr  Notes  Date
 
 tion   ce    etation  
 
   Range    
 
 
 
 
 Basophils  0 - 0.2  K/MM3  Normal  No   Sep 1 
 
       informati  2017 6:40
 
 [#/volume     on in    PM
 
 ] in      source  
 
 Blood by      data 
 
 Automated     
 
  count     
 
 Basophils  0.1 - 2.0  %  Normal  No   Sep 1 
 
 /100      informati  2017 6:40
 
 leukocyte     on in    PM
 
 s in      source  
 
 Blood by      data 
 
 Automated     
 
  count     
 
 Eosinophi  0.0 - 0.4  K/mm3  Normal  No   Sep 1 
 
 ls      informati  2017 6:40
 
 [#/volume     on in    PM
 
 ] in      source  
 
 Blood by      data 
 
 Automated     
 
  count     
 
 Eosinophi  0.1 -   %  Normal  No   Sep 1 
 
 ls/100   12.0    informati  2017 6:40
 
 leukocyte     on in    PM
 
 s in      source  
 
 Blood by      data 
 
 Automated     
 
  count     
 
 Granulocy  1.8 - 7.8  K/mm3  Normal  No   Sep 1 
 
 karen      informati  2017 6:40
 
 [#/volume     on in    PM
 
 ] in      source  
 
 Blood by      data 
 
 Automated     
 
  count     
 
 Granulocy  37.0 -   %  Normal  No   Sep 1 
 
 karne/100   80.0    informati  2017 6:40
 
 leukocyte     on in    PM
 
 s in      source  
 
 Blood by      data 
 
 Automated     
 
  count     
 
 Hematocri  37.0 -   %  Low  No   Sep 1 
 
 t [Volume  47.0    informati  2017 6:40
 
       on in    PM
 
 Fraction]     source  
 
  of Blood     data 
 
 Hemoglobi  12.2 -   g/dL  No   No   Sep 1 
 
 n   16.2   informati  informati  2017 6:40
 
 [Mass/vol    on in   on in    PM
 
 ume] in     source   source  
 
 Blood    data  data 
 
 Lymphocyt  0.7 - 4.5  K/mm3  Normal  No   Sep 1 
 
 es      informati  2017 6:40
 
 [#/volume     on in    PM
 
 ] in      source  
 
 Unspecifi     data 
 
 ed      
 
 specimen      
 
 by      
 
 Automated     
 
  count     
 
 Lymphocyt  10 - 50.0  %  Normal  No   Sep 1 
 
 es      informati  2017 6:40
 
 [#/volume     on in    PM
 
 ] in      source  
 
 Unspecifi     data 
 
 ed      
 
 specimen      
 
 by      
 
 Automated     
 
  count     
 
 Erythrocy  27 - 31.2  pg  Normal  No   Sep 1 
 
 te mean      informati  2017 6:40
 
 corpuscul     on in    PM
 
 ar      source  
 
 hemoglobi     data 
 
 n      
 
 [Entitic      
 
 mass]     
 
 Erythrocy  31.8 -   g/dl  Normal  No   Sep 1 
 
 te mean   35.4    informati  2017 6:40
 
 corpuscul     on in    PM
 
 ar      source  
 
 hemoglobi     data 
 
 n      
 
 concentra     
 
 tion      
 
 [Mass/vol     
 
 ume] by      
 
 Automated     
 
  count     
 
 Erythrocy  82.2 -   fl  Normal  No   Sep 1 
 
 te mean   97.8    informati  2017 6:40
 
 corpuscul     on in    PM
 
 ar volume     source  
 
  [Entitic     data 
 
  volume]      
 
 by      
 
 Automated     
 
  count     
 
 Monocytes  0.1 - 1.0  K/mm3  Normal  No   Sep 1 
 
       informati  2017 6:40
 
 [#/volume     on in    PM
 
 ] in      source  
 
 Blood by      data 
 
 Automated     
 
  count     
 
 Monocytes  1.7 - 9.3  %  Normal  No   Sep 1 
 
 /100      informati  2017 6:40
 
 leukocyte     on in    PM
 
 s in      source  
 
 Blood by      data 
 
 Automated     
 
  count     
 
 Platelet   7.4 -   fl  Low  No   Sep 1 
 
 mean   10.4    informati  2017 6:40
 
 volume      on in    PM
 
 [Entitic      source  
 
 volume]      data 
 
 in Blood      
 
 by      
 
 Automated     
 
  count     
 
 Platelets  142 - 424  K/mm3  Normal  No   Sep 1 
 
       informati  2017 6:40
 
 [#/volume     on in    PM
 
 ] in      source  
 
 Blood     data 
 
 Erythrocy  4.2 - 5.4  M/mm3  Low  No   Sep 1 
 
 karen      informati  2017 6:40
 
 [#/volume     on in    PM
 
 ] in      source  
 
 Amniotic      data 
 
 fluid     
 
 Erythrocy  11.5 -   %  Normal  No   Sep 1 
 
 te   17.5    informati  2017 6:40
 
 distribut     on in    PM
 
 ion width     source  
 
  [Entitic     data 
 
  volume]      
 
 by      
 
 Automated     
 
  count     
 
 Leukocyte  4.8 -   K/MM3  Normal  No   Sep 1 
 
 s   10.8    informati   6:40
 
 [#/volume     on in    PM
 
 ] in      source  
 
 Blood     data 
 
 
 
 
 HepC Qnt-ARUP
 
 
 
 
 Observa  Value  Referen  Units  Interpr  Notes  Date
 
 tion   ce    etation  
 
   Range    
 
 Hepatit  7.3  No   log_IU  No   INTERPR   
 
 is C    informa   informa  ETIVE   2017 
 
 RNA   tion in   tion in  INFORMA  3:50 PM
 
    source    source  TION:  
 
    data    data  Hepatit 
 
      is C  
 
      Virus  
 
      by  
 
      Quantit 
 
      ative  
 
      PCR\.br 
 
      \The  
 
      quantit 
 
      ative  
 
      range  
 
      of this 
 
       assay  
 
      is 1.2  
 
      - 8.0  
 
      log  
 
      IU/mL  
 
      (15-\.b 
 
      r\100,0 
 
      00,000  
 
      IU/mL). 
 
      \.br\Li 
 
      elaine of  
 
      detecti 
 
      on  
 
      (LOD):\ 
 
      .br\15  
 
      IU/mL  
 
      (1.2  
 
      log  
 
      IU/mL)\ 
 
      .br\LOD 
 
       values 
 
       do not 
 
       apply  
 
      to  
 
      diluted 
 
        
 
      specime 
 
      ns.\.br 
 
      \An  
 
      interpr 
 
      etation 
 
       of  
 
      "Not  
 
      Detecte 
 
      d" does 
 
       not  
 
      rule  
 
      out the 
 
        
 
      presenc 
 
      e\.br\o 
 
      f PCR  
 
      inhibit 
 
      ors in  
 
      the  
 
      patient 
 
        
 
      specime 
 
      n or  
 
      hepatit 
 
      is C  
 
      virus  
 
      RNA\.br 
 
      \concen 
 
      tration 
 
      s below 
 
       the  
 
      level  
 
      of  
 
      detecti 
 
      on of  
 
      the  
 
      test.  
 
      Care\.b 
 
      r\shoul 
 
      d be  
 
      taken  
 
      when  
 
      interpr 
 
      eting  
 
      any  
 
      single  
 
      viral  
 
      load\.b 
 
      r\deter 
 
      minatio 
 
      n.\.br\ 
 
      This  
 
      test  
 
      should  
 
      not be  
 
      used  
 
      for  
 
      blood  
 
      donor  
 
      screeni 
 
      ng,  
 
      associa 
 
      regi\.br 
 
      \re-ent 
 
      ry  
 
      protoco 
 
      ls, or  
 
      for  
 
      screeni 
 
      ng  
 
      Human  
 
      Cell,  
 
      Tissues 
 
        
 
      and\.br 
 
      \Cellul 
 
      ar  
 
      Tissue- 
 
      Based  
 
      Product 
 
      s  
 
      (HCT/P) 
 
      . 
 
 HCV   19,000,  No   IU/mL  No   No    
 
 IU-[facility]  000  informa   informa  informa  2017 
 
   tion in   tion in  tion in  3:50 PM
 
    source    source   source 
 
    data    data   data 
 
 HCV RNA  Detecte  Not   No   Abnorma  No    
 
    d  Detecte  informa  l  informa  2017 
 
 Interpr   d  tion in   tion in  3:50 PM
 
 etation     source    source 
 
     data    data 
 
 EER HCV  See   No   No   No   Access    
 
  RNA   Note  informa  informa  informa  AR[facility]   2017 
 
 Quant    tion in  tion in  tion in  Enhance  3:50 PM
 
 RT-PCR-    source   source   source  d  
 
 [facility]    data   data   data  Report  
 
      using  
 
      either  
 
      link  
 
      below:\ 
 
      .br\\.b 
 
      r\-Dire 
 
      ct  
 
      access: 
 
        
 
      https:/ 
 
      [email or web address] 
 
      [id number] 
 
      [id number] 
 
      [id number] 
 
      \.br\\. 
 
      br\-Ent 
 
      er  
 
      Usernam 
 
      e,  
 
      Passwor 
 
      d:  
 
      https:/ 
 
      [email or web address] 
 
      Juristat. 
 
      com\.br 
 
      \  
 
      Usernam 
 
      e:  
 
      9Km?q-E 
 
      7\.br\  
 
      Passwor 
 
      d:  
 
      X!d3+c\ 
 
      .br\Per 
 
      formed  
 
      by EMERITA garcia,\ 
 
      .br\500 
 
        
 
      Yusef Cummins  
 
      [facility] [place]  
 
      [id number]  
 
      [phone]\. 
 
      br\www. 
 
      aruplab 
 
      .com,  
 
      Ramesh Blankenship MD -  
 
      Lab.  
 
      Direct[facility] 
 
      r 
 
 
 
 
 EK EKG 12 LEAD
 
 
 
 
 Observa  Value  Referen  Units  Interpr  Notes  Date
 
 tion   ce    etation  
 
   Range    
 
    Station  No   No   No   No    
 
  clara ECG  informa  informa  informa  informa  2017 
 
     tion in  tion in  tion in  tion in  5:20 PM
 
  Study\.   source   source   source   source 
 
  br\St.    data   data   data   data 
 
  Elizabe     
 
  th      
 
  [place]     
 
  h\.br\I     
 
  nterpre     
 
  tive      
 
  Stateme     
 
  nts\.br     
 
  \Sinus      
 
  bradyca     
 
  rdia\.b     
 
  r\Chrissie     
 
  l ECG      
 
  except      
 
  for      
 
  rate\.b     
 
  r\Elect     
 
  ronical     
 
  ly      
 
  Signed      
 
  On      
 
  7-15-20     
 
  17      
 
  11:30:5     
 
  4 EDT      
 
  by Price Whitman MD     
 
 
 
 
 [place]yd/GC TMA
 
 
 
 
 Observa  Value  Referen  Units  Interpr  Notes  Date
 
 tion   ce    etation  
 
   Range    
 
 [place]  Negativ  No   No   No   No    
 
 ia   e  informa  informa  informa  informa   
 
 trachom   tion in  tion in  tion in  tion in  12:45 
 
 atis    source   source   source   source  PM
 
    data   data   data   data 
 
 Neisser  Negativ  No   No   No   Testing   
 
 ia   e  informa  informa  informa      
 
 gonorrh   tion in  tion in  tion in  methodo  12:45 
 
 oeae    source   source   source  logy is  PM
 
    data   data   data    
 
      transcr 
 
      iption  
 
      mediate 
 
      d  
 
      amplifi 
 
      cation  
 
      (TMA)  
 
      using  
 
      the  
 
      Aptima  
 
      Combo 2 
 
       assay  
 
      from  
 
      [employer] 
 
      /Fraxion 
 
      be.\.br 
 
      \A  
 
      negativ 
 
      e  
 
      result  
 
      does  
 
      not  
 
      complet 
 
      ely  
 
      rule  
 
      out a  
 
      [place] 
 
      ia  
 
      trachom 
 
      atis or 
 
        
 
      Neisser 
 
      ia  
 
      gonorrh 
 
      oeae  
 
      infecti 
 
      on due  
 
      to  
 
      potenti 
 
      al  
 
      inhibit 
 
      ors or  
 
      levels  
 
      present 
 
       below  
 
      the  
 
      limit  
 
      of  
 
      detecti 
 
      on by  
 
      this  
 
      assay.  
 
        
 
      Results 
 
       are  
 
      depende 
 
      nt on  
 
      proper  
 
      collect 
 
      ion and 
 
        
 
      transpo 
 
      rt of  
 
      specime 
 
      n. This 
 
       test  
 
      is  
 
      indicat 
 
      ed for  
 
      medical 
 
        
 
      purpose 
 
      s only  
 
      and  
 
      should  
 
      not be  
 
      used  
 
      for  
 
      legal  
 
      or  
 
      forensi 
 
      c  
 
      purpose 
 
      s.\.br\ 
 
      \.br\Th 
 
      e  
 
      perform 
 
      ance  
 
      charact 
 
      eristic 
 
      s of  
 
      this  
 
      test  
 
      were  
 
      validat 
 
      ed by  
 
      [facility] 
 
      are  
 
      laborat 
 
      ory.  
 
      This  
 
      assay  
 
      is FDA  
 
      cleared 
 
       to  
 
      test  
 
      the  
 
      followi 
 
      ng  
 
      specime 
 
      ns:  
 
      clinici 
 
      an-kelechi 
 
      ected  
 
      endocer 
 
      vical,  
 
      vaginal 
 
       and  
 
      male  
 
      urethra 
 
      l swab  
 
      specime 
 
      ns,  
 
      patient 
 
        
 
      collect 
 
      ed  
 
      vaginal 
 
        
 
      specime 
 
      ns  
 
      within  
 
      a  
 
      clinic  
 
      setting 
 
      , Thin  
 
      Prep  
 
      Specime 
 
      ns in  
 
      Preserv 
 
      Cyt  
 
      Solutio 
 
      n, and  
 
      first-s 
 
      tream,  
 
      unprese 
 
      rved  
 
      male  
 
      urine  
 
      specime 
 
      ns.  
 
      Testing 
 
       on  
 
      female  
 
      urine  
 
      is not  
 
      FDA  
 
      approve 
 
      d by  
 
      this  
 
      methodo 
 
      logy,  
 
      but has 
 
       been  
 
      develop 
 
      ed and  
 
      validat 
 
      ed by  
 
      the [facility] 
 
      are  
 
      laborat 
 
      ory.   
 
      Detaile 
 
      d  
 
      methodo 
 
      logy is 
 
        
 
      availab 
 
      le upon 
 
        
 
      request 
 
      . 
 
 
 
 
 Hep Prf-Ac
 
 
 
 
 Observa  Value  Referen  Units  Interpr  Notes  Date
 
 tion   ce    etation  
 
   Range    
 
 Hepatit  Negativ  Negativ  No   No   No    
 
 is B   e  e  informa  informa  informa  2017 
 
 virus     tion in  tion in  tion in  9:05 AM
 
 surface     source   source   source 
 
  Ag      data   data   data 
 
 [Presen      
 
 ce] in       
 
 Serum       
 
 by       
 
 Immunoa      
 
 ssay      
 
 Hep B   Negativ  Negativ  No   No   No    
 
 Core   e  e  informa  informa  informa  2017 
 
 IgM    tion in  tion in  tion in  9:05 AM
 
     source   source   source 
 
     data   data   data 
 
 Hepatit  Negativ  Negativ  No   No   No    
 
 is A   e  e  informa  informa  informa  2017 
 
 virus     tion in  tion in  tion in  9:04 AM
 
 Ab      source   source   source 
 
 [Units/     data   data   data 
 
 volume]      
 
  in       
 
 Serum       
 
 by       
 
 Radioim      
 
 munoass      
 
 ay       
 
 (MANNY)      
 
 Hep C   Positiv  Negativ  No   Abnorma  High    
 
 Ab  e  e  informa  l  signal/  2017 
 
    tion in   cutoff   9:21 AM
 
     source   ratio  
 
     data   (>= 5,  
 
      Archite 
 
      ct  
 
      Anti-HC 
 
      V). [facility] 
 
      are  
 
      Laborat 
 
      ory  
 
      recomme 
 
      nds  
 
      collect 
 
      ing a  
 
      new  
 
      specime 
 
      n and  
 
      testing 
 
       for  
 
      Hepatit 
 
      is C  
 
      RNA  
 
      Quantit 
 
      ative  
 
      PCR to  
 
      confirm 
 
        
 
      positiv 
 
      ity and 
 
        
 
      provide 
 
       a  
 
      baselin 
 
      e viral 
 
       load  
 
      for  
 
      monitor 
 
      ing  
 
      treatme 
 
      nt  
 
      efficac 
 
      y. 
 
 
 
 
 Mg
 
 
 
 
 Observa  Value  Referen  Units  Interpr  Notes  Date
 
 tion   ce    etation  
 
   Range    
 
 Magnesi  2.0  1.6 -   mg/dL  No   No    
 
 um    2.4   informa  informa   
 
 [Moles/     tion in  tion in  6:37 PM
 
 volume]      source   source 
 
  in       data   data 
 
 Serum       
 
 or       
 
 Plasma      
 
 
 
 
 TSH
 
 
 
 
 Observa  Value  Referen  Units  Interpr  Notes  Date
 
 tion   ce    etation  
 
   Range    
 
 Thyrotr  0.516  0.270 -  mcIU/mL  No   No    
 
 opin     4.200   informa  informa   
 
 [Units/     tion in  tion in  6:37 PM
 
 volume]      source   source 
 
  in       data   data 
 
 Serum       
 
 or       
 
 Plasma      
 
 
 
 
 GGT
 
 
 
 
 Observa  Value  Referen  Units  Interpr  Notes  Date
 
 tion   ce    etation  
 
   Range    
 
 Gamma   52  5 - 36  IU/L  High  No    
 
 glutamy      informa  2017 
 
 l       tion in  6:37 PM
 
 transfe       source 
 
 rase        data 
 
 [Enzyma      
 
 tic       
 
 activit      
 
 y/volum      
 
 e] in       
 
 Serum       
 
 or       
 
 Plasma      
 
 
 
 
 Hemogram
 
 
 
 
 Observa  Value  Referen  Units  Interpr  Notes  Date
 
 tion   ce    etation  
 
   Range    
 
 LEUKOCY  9.7  4.0 -   x10(3)/  No   No    
 
 KAREN   11.0  mcL  informa  informa  2017 
 
     tion in  tion in  3:55 PM
 
      source   source 
 
      data   data 
 
 Erythro  3.86  3.80 -   x10(6)/  No   No    
 
 cytes    5.10  mcL  informa  informa   
 
 [#/volu     tion in  tion in  3:55 PM
 
 me] in       source   source 
 
 Blood       data   data 
 
 by       
 
 Automat      
 
 ed       
 
 count      
 
 Hemoglo  11.7  12.0 -   gm/dL  Low  No    
 
 bin    15.6    informa   
 
 [Mass/v      tion in  3:55 PM
 
 olume]        source 
 
 in        data 
 
 Blood      
 
 Hematoc  35.9  35.7 -   %  No   No    
 
 rit    45.9   informa  informa   
 
 [Volume     tion in  tion in  3:55 PM
 
        source   source 
 
 Fractio      data   data 
 
 n] of       
 
 Blood       
 
 by       
 
 Automat      
 
 ed       
 
 count      
 
 Erythro  93.2  82.5 -   fL  No   No    
 
 cyte    99.8   informa  informa  2017 
 
 mean      tion in  tion in  3:55 PM
 
 corpusc      source   source 
 
 ular       data   data 
 
 volume       
 
 [Entiti      
 
 c       
 
 volume]      
 
  by       
 
 Automat      
 
 ed       
 
 count      
 
 Erythro  30.2  27.0 -   pg  No   No    
 
 cyte    34.3   informa  informa   
 
 mean      tion in  tion in  3:55 PM
 
 corpusc      source   source 
 
 ular       data   data 
 
 hemoglo      
 
 bin       
 
 [Entiti      
 
 c mass]      
 
  by       
 
 Automat      
 
 ed       
 
 count      
 
 Erythro  32.4  32.1 -   gm/dL  No   No    
 
 cyte    35.3   informa  informa  2017 
 
 mean      tion in  tion in  3:55 PM
 
 corpusc      source   source 
 
 ular       data   data 
 
 hemoglo      
 
 bin       
 
 concent      
 
 ration       
 
 [Mass/v      
 
 olume]       
 
 by       
 
 Automat      
 
 ed       
 
 count      
 
 Erythro  15.1  11.5 -   %  High  No    
 
 cyte    15.0    informa  2017 
 
 distrib      tion in  3:55 PM
 
 ution        source 
 
 width        data 
 
 [Ratio]      
 
  by       
 
 Automat      
 
 ed       
 
 count      
 
 Platele  234  144 -   x10(3)/  No   No    
 
 ts    423  mcL  informa  informa  2017 
 
 [#/volu     tion in  tion in  3:55 PM
 
 me] in       source   source 
 
 Blood       data   data 
 
 by       
 
 Automat      
 
 ed       
 
 count      
 
 MPV  8.3  6.8 -   fL  No   No    
 
   10.8   informa  informa  2017 
 
     tion in  tion in  3:55 PM
 
      source   source 
 
      data   data 
 
 
 
 
 UA
 
 
 
 
 Observa  Value  Referen  Units  Interpr  Notes  Date
 
 tion   ce    etation  
 
   Range    
 
 UA   Dorinda  No   No   No   No    
 
 Color   informa  informa  informa  informa  2017 
 
   tion in  tion in  tion in  tion in  8:39 PM
 
    source   source   source   source 
 
    data   data   data   data 
 
 UA   Cloudy  Clear  No   Abnorma  No    
 
 Appear    informa  l  informa  2017 
 
    tion in   tion in  8:39 PM
 
     source    source 
 
     data    data 
 
 UA   Negativ  Negativ  No   No   No    
 
 Glucose  e  e  informa  informa  informa  2017 
 
    tion in  tion in  tion in  8:39 PM
 
     source   source   source 
 
     data   data   data 
 
 UA   Negativ  Negativ  No   No   No    
 
 Ketones  e  e  informa  informa  informa   
 
    tion in  tion in  tion in  8:39 PM
 
     source   source   source 
 
     data   data   data 
 
 UA   Negativ  Negativ  No   No   No    
 
 Blood  e  e  informa  informa  informa  2017 
 
    tion in  tion in  tion in  8:39 PM
 
     source   source   source 
 
     data   data   data 
 
 UA pH  5.0  4.8 -   No   No   Referen   
 
   8.0  informa  informa  ce   2017 
 
    tion in  tion in  range   8:39 PM
 
     source   source  valid  
 
     data   data  for  
 
      random  
 
      specime 
 
      ns  
 
      only. 
 
 UA   100   Negativ  No   Abnorma  No    
 
 Protein  mg/dl  e  informa  l  informa   
 
    tion in   tion in  8:39 PM
 
     source    source 
 
     data    data 
 
 UA   2 mg/dl  <=1   No   Abnorma  No    
 
 Urobili   mg/dl  informa  l  informa  2017 
 
 nogen    tion in   tion in  8:39 PM
 
     source    source 
 
     data    data 
 
 UA   Negativ  Negativ  No   No   No    
 
 Nitrite  e  e  informa  informa  informa  2017 
 
    tion in  tion in  tion in  8:39 PM
 
     source   source   source 
 
     data   data   data 
 
 UA Leuk  Large  Negativ  No   Abnorma  No    
 
  Est   e  informa  l  informa  2017 
 
    tion in   tion in  8:39 PM
 
     source    source 
 
     data    data 
 
 UA Spec  1.028  1.001 -  No   No   Referen   
 
  Grav    1.035  informa  informa  ce   2017 
 
    tion in  tion in  range   8:39 PM
 
     source   source  valid  
 
     data   data  for  
 
      random  
 
      specime 
 
      ns  
 
      only. 
 
 UA WBC  39  0 - 4  /HPF  High  No    
 
      informa  2017 
 
      tion in  8:39 PM
 
       source 
 
       data 
 
 UA RBC  4  0 - 3  /HPF  High  No    
 
      informa  2017 
 
      tion in  8:39 PM
 
       source 
 
       data 
 
 UA   4+  No   No   No   No    
 
 Squam    informa  informa  informa  informa  2017 
 
 Epi   tion in  tion in  tion in  tion in  8:39 PM
 
    source   source   source   source 
 
    data   data   data   data 
 
 UA   3+  No   No   No   No    
 
 Mucous   informa  informa  informa  informa  2017 
 
   tion in  tion in  tion in  tion in  8:39 PM
 
    source   source   source   source 
 
    data   data   data   data 
 
 UA   Trace  No   No   Abnorma  No    
 
 Bacteri   informa  informa  l  informa  2017 
 
 a   tion in  tion in   tion in  8:39 PM
 
    source   source    source 
 
    data   data    data 
 
 UA CA   Few  No   No   No   No    
 
 Ox Flor   informa  informa  informa  informa  2017 
 
   tion in  tion in  tion in  tion in  8:39 PM
 
    source   source   source   source 
 
    data   data   data   data

## 2017-10-07 NOTE — EXTERNAL MEDICAL SUMMARY RPT
Patient Summary
 Created on: 10/05/2017
 
BERYL MANN
External Reference #: [id number]
: [id number]
Sex: Female
 
Demographics
 
 
 
 Address  [address], [place] [address]
 
 Home Phone  [phone]
 
 Preferred Language  Unknown
 
 Marital Status  Unknown
 
 Faith Affiliation  Unknown
 
 Race  White
 
 Ethnic Group  Unknown
 
 
Author
 
 
 
 Author            ,            SAMIR DAWSON
 
 Address  Unknown
 
 Phone  [email or web address]
 
 
 
Support
 
 
 
 Name  Relationship  Address  Phone
 
 MACKENZIE,            Next Of Kin  [address]   +1 
 
   DANIEL CLAIREECDEENA,   [phone]
 
   [place]  [address] 
 
 
 
Care Team Providers
 
 
 
 Care Team Member Name  Role  Phone
 
 VINICIO COOK,   Unavailable  Unavailable
 
 VINICIO COOK   
 
 ELLIS ALL, ELLIS ALL   Unavailable  Unavailable
 
 [facility]   Un[facility]  Unavailable
 
 [facility]   
 
 RAJESH PAMELA,   Unavailable  Unavailable
 
 RAJESH PAMELA   
 
 RAJESH PAMELA,   Unavailable  Unavailable
 
 RAJESH PAMELA   
 
 RAJESH, REYNA,   Unavailable  Unavailable
 
 RAJESH, REYNA   
 
 [facility]  Unavailable  Unavailable
 
  [facility]  
 
    
 
 [facility]   Unavailable  Unavailable
 
 [place]   
 
 [facility] [place]  
 
    
 
 [facility]   Unavailable  Unavailable
 
 [place]  
 
  [facility] [place]  
 
    
 
 [facility]   Unavailable  Unavailable
 
 HEATHER ABNER, HEATHER   Unavailable  Unavailable
 
 ABNER   
 
 PITO ROMERO S,   Unavailable  Unavailable
 
 PITO ROMERO S   
 
 SHAHLA CRUZ,   Unavailable  Unavailable
 
 SHAHLA CRUZ   
 
 [facility]   Unavailable  Unavailable
 
 [facility]   
 
 [facility]   
 
 DANIEL MULLEN,   Unavailable  Unavailable
 
 DANIEL MULLEN   
 
 [facility],  Unavailable  Unavailable
 
  [facility]  
 
    
 
 [facility]   Unavailable  Unavailable
 
 [facility], [facility]   
 
 VÍCTOR GRAVES   Unavailable  Unavailable
 
 VÍCTOR GRAVES   Unavailable  Unavailable
 
 TASH THOMAS,   Unavailable  Unavailable
 
 TASH THOMAS EMMETT P,   Unavailable  Unavailable
 
 SHELLY DUQUE   
 
 [facility]   Unavailable  Unavailable
 
 [facility]   
 
 [facility]   
 
 [facility],   Unavailable  Unavailable
 
 [facility]   
 
 RENUSCH, RENUSCH   Unavailable  Unavailable
 
 GENA ARNOLD,   Unavailable  Unavailable
 
 GENA ARNOLD MD,   Unavailable  Unavailable
 
 ÁLVARO Yanez MD,   Unavailable  Unavailable
 
 ÁLVARO CORDERO A C, WRIGHT,   Unavailable  Unavailable
 
 ASA C   
 
                                            
 
Purpose
                      Continuity of Care Document - 2009 through 10-05-
2017                                                                            
                     
 
Problems
                      
 
 
 Code         Diagnosis    DOS          Provider     Status     
 
                                                               
 
               
 
 [id number]       CUTANEOUS   2017   [facility]              
 
              ABSCESS OF                PHYSICIANS,             
 
      RIGHT UPPER           PLLC                   
 
   LIMB                       
 
                  
 
      
 
 D649         ANEMIA   2015   [facility]              
 
              UNSPECIFIED               PHYSICIANS,             
 
                            PLLC                   
 
                          
 
      
 
 I10          ESSENTIAL   2015   [facility]              
 
              PRIMARY                [facility]              
 
      HYPERTENSIO          INC                     
 
  N                          
 
             
 
 [id number]       CELLULITIS   2015   [facility]              
 
              OF RIGHT                PHYSICIANS,             
 
      UPPER LIMB            PLLC                   
 
                              
 
             
 
 R945         ABNORMAL   2015   [facility]              
 
              RESULTS OF                [facility]              
 
      LIVER           INC                     
 
  FUNCTION                 
 
  STUDIES       
 
                
 
        
 
 Z720         TOBACCO USE  2015   [facility]              
 
                                        [facility]              
 
                       INC                     
 
                 
 
 [id number]       UNSPECIFIED  10-   [facility]              
 
               ASTHMA                [facility]              
 
      UNCOMPLICAT          INC                     
 
  ED                         
 
              
 
 12656        ASTHMA,   2015   [facility]              
 
              UNSPECIFIED               PHYSICIANS              
 
      ,           GROUP                   
 
  UNSPECIFIED                 
 
   STATUS         
 
                
 
        
 
 33289        OTHER   2015   [facility]              
 
              MALAISE AND               PHYSICIANS              
 
       FATIGUE             GROUP                   
 
                              
 
           
 
 7905         OTHER   2015   [facility]              
 
              NONSPECIFIC               PHYSICIANS              
 
       ABNORMAL           GROUP                   
 
  SERUM                  
 
  ENZYME      
 
  LEVELS        
 
                
 
       
 
 03618        DEGEN   2015   [place]              
 
              LUMBAR/LUMB               MEDICAL              
 
      OSACRAL           IMAGING ASS             
 
  INTERVERTEB                 
 
  RAL DISC              
 
                
 
         
 
 7245         UNSPECIFIED  2015   [place]              
 
               BACKACHE                 MEDICAL              
 
                           IMAGING ASS             
 
                        
 
            
 
 7804         DIZZINESS   2015   [place]              
 
              AND                MEDICAL              
 
      GIDDINESS            IMAGING ASS             
 
                              
 
                  
 
 72839        SHORTNESS   2015   [place]              
 
              OF BREATH                 MEDICAL              
 
                           IMAGING ASS             
 
                        
 
            
 
 2859         UNSPECIFIED  2014   [facility]                
 
               ANEMIA                                           
 
                                          
 
          
 
 6202         OTHER AND   2014   [facility]              
 
              UNSPECIFIED               [facility]ELA                     
 
     OVARIAN                                  
 
  CYST            
 
                
 
 3384         CHRONIC   2010   JOYCELYN COOK                VINICIO W               
 
    SYNDROME                                     
 
                        
 
         
 
 V154         PERS HX   2010   [facility]              
 
              PSYCHOLOGIC               [facility]             
 
    AL TRAUMA                                   
 
  PRS HAZARDS             
 
   HEALTH       
 
                
 
        
 
 73659        REGULAR   2010   [facility]              
 
              ASTIGMATISM               VISION                  
 
                                                 
 
                 
 
 96772        UNSPECIFIED  2010   [facility]              
 
                              SERVICES              
 
    ARTHROPATHY          PSC                     
 
   OTHER                 
 
  SPECIFIED    
 
  SITES         
 
                
 
      
 
 73952        DISPLCMT   2010   [facility]              
 
              LUMBAR                SERVICES              
 
    INTERVERT           PSC                     
 
  DISC W/O                 
 
  MYELOPATHY    
 
                
 
           
 
 7242         LUMBAGO      2010   [facility]              
 
                                        SERVICES              
 
                 PSC                     
 
                 
 
 41454        SPINAL STEN  2010   [facility]              
 
               LUMB REG                [facility]              
 
    W/O           INC                     
 
  NEUROGENIC                 
 
  CLAUDICATIO   
 
  N             
 
             
 
 V571         OTHER   2010   [facility]              
 
              PHYSICAL                [facility]              
 
    THERAPY              INC                     
 
                             
 
        
 
 89001        CONTUSION   2010   [facility]              
 
              OF BACK                   [facility]              
 
                         INC                     
 
                     
 
 8472         LUMBAR   2010   [place]              
 
              SPRAIN AND                EMERGENCY              
 
    STRAIN               SERVICES              
 
                ASSOCIATES    
 
                   
 
           
 
         PLACE OF   2010   [place]              
 
              OCCURRENCE,               MEDICAL              
 
     HOME                IMAGING              
 
                ASSOCIATES    
 
                  
 
           
 
         FALL FROM   2010   [place]              
 
              OTHER                MEDICAL              
 
    SLIPPING           IMAGING              
 
  TRIPPING OR   ASSOCIATES    
 
   [facility]                
 
                       
 
           
 
 4660         ACUTE   10-   [place],              
 
              BRONCHITIS                VINICIO W               
 
                                                 
 
                   
 
 5276         MUCOCELE OF  10-   [facility]              
 
               SALIVARY                PATHOLOGY              
 
    GLAND                LABORATORY              
 
                              
 
             
 
 22529        DENTAL   2009   [facility],              
 
              CARIES                DANIEL COOPER                
 
    EXTENDING                                   
 
  INTO PULP            
 
                
 
          
 
 5220         PULPITIS     2009   D[facility]                
 
                                          
 
           
 
 90434        TOOTH   2009   [facility],              
 
              BROKEN FX                DANIEL COOPER                
 
    DUE TO                                   
 
  TRAUMA W/O           
 
  MENTION    
 
  COMP          
 
                
 
 [id number]    Cannabis                [place]              
 
              type drug                [facility]                
 
                                  
 
                
 
 [id number]     Narcotic                [place]              
 
              drug user                 [facility]                
 
                            
 
         
 
 780.2        Syncope                   [facility]                
 
                      
 
         
 
 D64.9        ANEMIA,                                        
 
              UNSPECIFIED                                       
 
                                                      
 
                      
 
 F17.200      NICOTINE                                        
 
              DEPENDENCE,                                       
 
                                              
 
  UNSPECIFIED             
 
  ,    
 
  UNCOMPLICAT   
 
  ED            
 
              
 
 F19.10       OTHER                                        
 
              PSYCHOACTIV                                       
 
     E SUBSTANCE                                       
 
   ABUSE,              
 
  UNCOMPLICAT   
 
  ED            
 
              
 
 F19.20       OTHER                                        
 
              PSYCHOACTIV                                       
 
     E SUBSTANCE                                       
 
                
 
  DEPENDENCE,   
 
      
 
  UNCOMPLICAT   
 
  ED            
 
              
 
 F19.90       OTHER                                        
 
              PSYCHOACTIV                                       
 
     E SUBSTANCE                                       
 
   USE,              
 
  UNSPECIFIED   
 
  ,    
 
  UNCOMPLICAT   
 
  ED            
 
              
 
 L02.91       CUTANEOUS                                        
 
              ABSCESS,                                        
 
     UNSPECIFIED                                       
 
                          
 
            
 
 L03.90       CELLULITIS,                                       
 
                                                      
 
     UNSPECIFIED                                       
 
                          
 
            
 
 R10.9        UNSPECIFIED                                       
 
               ABDOMINAL                                        
 
    PAIN                                              
 
                          
 
 R79.89       OTHER                                        
 
              SPECIFIED                                        
 
     ABNORMAL                                        
 
  FINDINGS OF             
 
   BLOOD    
 
  CHEMISTRY     
 
                
 
          
 
                                                                                
                                                                                
                                                                                
                                                                                
                                                                                
                                                                                
                                                    
 
Allergies, Adverse Reactions, Alerts
                      
 
 
 Type                
 
 Drug Allergy                
 
            Adverse Reaction to Substance            
 
 
 Substance              Reaction               Severity             
 
                   
 
 Naproxen               I-RASH                 Intermediate         
 
                    
 
 Codeine                I-RASH                 Intermediate         
 
                   
 
                                                                                
                           
 
Medications
                      
 
 
 Na  ND  Rx  Da  Fi  Fi  Am  Da  Di  Ph  RX  Ph  St
 
 me  C   No  te  ll  ll  ou  ys  ag  ar   #  ys  at
 
         rm        s   nt      no  ma      ic  us
 
             Or  Da              si  cy      ia    
 
             de  te              s           n     
 
             re                                    
 
             d                                     
 
                                                   
 
                                                   
 
                                                   
 
                                                  
 
                                   
 
                           
 
                      
 
               
 
              
 
 AR  68      09  09      30  8           00  [place]  Ac
 
 OM  38      -0  -2      .0              00  L-  ti
 
 ET  20      4-  9-      00              07  MA  ve
 
 HA  04      20  20                      50  RT    
 
 ZI  10      17  17                      75       
 
 NE  1                                   29  PH    
 
                                            AR    
 
 25                                          MA    
 
                                            CY    
 
 MG                                             
 
                                      #5    
 
 TA                                    91 
 
 BL                                    
 
 ET                                    
 
                                
 
                           
 
                           
 
                           
 
                  
 
                  
 
                  
 
               
 
               
 
 HY  00      09  09      10  3           00  [place]  Ac
 
 DR  40      -0  -2      .0              00  L-  ti
 
 OC  60      4-  9-      00              02  MA  ve
 
 OD  12      20  20                      24  RT    
 
 ON  50      17  17                      17       
 
 -A  1                                   72  PH    
 
 CE                                          AR    
 
 TA                                          MA    
 
 MI                                          CY    
 
 NO                                             
 
 PH                                    #5    
 
 N                                     91 
 
 10                                    
 
 -3                                    
 
 25                             
 
                           
 
                           
 
                           
 
                  
 
                  
 
                  
 
               
 
               
 
               
 
 JAIME  53      09  09      20  10          00  [place]  Ac
 
 LF  74      -0  -2      .0              00  L-  ti
 
 AM  60      4-  9-      00              07  MA  ve
 
 ET  27      20  20                      50  RT    
 
 HO  20      17  17                      75       
 
 XA  5                                   30  PH    
 
 ZO                                          AR    
 
 LE                                          MA    
 
 -T                                          CY    
 
 MP                                              
 
                                      #5    
 
 DS                                    91 
 
                                      
 
 TA                                    
 
 BL                             
 
 ET                        
 
                           
 
                           
 
                  
 
                  
 
                  
 
               
 
               
 
               
 
               
 
 TR  57      09  09      6.  3           00  HO  Ac
 
 AM  66      -0  -2      00              00  ME  ti
 
 AD  40      6-  9-      0               04  TO  ve
 
 OL  37      20  20                      02  WN    
 
    71      17  17                      44       
 
 HC  8                                   70  PH    
 
 L                                           AR    
 
 50                                          MA    
 
                                            CY    
 
 MG                                             
 
                                      OF    
 
 TA                                     
 
 BL                                 CY 
 
 ET                                 NT 
 
                             HI 
 
                        AN 
 
                        A  
 
                           
 
                  
 
                  
 
                  
 
                  
 
                  
 
               
 
               
 
              
 
 CL  00      08  09      40  10          00  HO  Ac
 
 IN  59      -2  -2      .0              00  ME  ti
 
 DA  12      9-  2-      00              06  TO  ve
 
 MY  93      20  20                      09  WN    
 
 CI  20      17  17                      32       
 
 N   1                                   77  PH    
 
 HC                                          AR    
 
 L                                           MA    
 
 30                                          CY    
 
 0                                               
 
 MG                                    OF    
 
                                        
 
 CA                                 CY 
 
 PS                                 NT 
 
 UL                          HI 
 
 E                      AN 
 
                        A  
 
                           
 
                  
 
                  
 
                  
 
                  
 
                  
 
                  
 
                 
 
              
 
 OX  53      08  09      10  2           00  HO  Ac
 
 YC  74      -2  -2      .0              00  ME  ti
 
 OD  60      9-  2-      00              02  TO  ve
 
 ON  20      20  20                      01  WN    
 
 E-  30      17  17                      44       
 
 AC  5                                   93  PH    
 
 ET                                          AR    
 
 AM                                          MA    
 
 IN                                          CY    
 
 OP                                             
 
 HE                                    OF    
 
 N                                       
 
 5-                                 CY 
 
 32                                 NT 
 
 5                           HI 
 
                        AN 
 
                        A  
 
                           
 
                  
 
                  
 
                  
 
                  
 
                  
 
                 
 
               
 
              
 
 SO  00          07          0                   No
 
 DI  40          -0                               
 
 UM  97          3-                              Lo
 
    98          20                              ng
 
 CH  30          13                              er
 
 LO  9                                            
 
 RI                                              Ac
 
 DE                                              ti
 
                                                ve
 
 0.                                              
 
 9%                                          
 
                                            
 
 SO                                          
 
 NOMAN                                          
 
 TI                                      
 
 ON                                    
 
                                       
 
                                    
 
               
 
               
 
               
 
               
 
               
 
               
 
               
 
 Sa  63          07          0                   No
 
 li  80          -0                               
 
 ne  70          3-                              Lo
 
    10          20                              ng
 
 Fl  07          13                              er
 
 us  5                                            
 
 h                                               Ac
 
 10                                              ti
 
 ML                                              ve
 
                                                
 
 Sy                                          
 
 ri                                          
 
 ng                                          
 
 e                                           
 
                                         
 
                                       
 
                                       
 
                                    
 
               
 
               
 
               
 
               
 
              
 
 AL  00      03  05  5   90  30      EA  16  AR  Ac
 
 AR  78      -1  -1      .0          ST  82  NO  ti
 
 AZ  11      7-  7-      00          SI  20  LD  ve
 
 OL  08      20  20                  DE           
 
 AM  90      10  10                         RI    
 
  2  5                               PH      CH    
 
                                    AR      AR    
 
 MG                                  MA      D     
 
                                    CY      W     
 
 TA                                             
 
 BL                         OF            
 
 ET                                   
 
                         CY      
 
                         NT      
 
                  HI      
 
                AN      
 
                A       
 
                       
 
                  
 
                  
 
                  
 
               
 
               
 
               
 
               
 
              
 
     00      03  05  1   15  25      EA  16  GI  Ac
 
     59      -2  -1      0.          ST  98  LB  ti
 
     10      9-  7-      00          SI  21  ER  ve
 
     38      20  20      0           DE      T     
 
     50      10  10                         [place]    
 
     1                               PH      HN    
 
                                     AR       W    
 
                                     MA            
 
                                     CY            
 
                                                
 
                            OF            
 
                                      
 
                           CY      
 
                         NT      
 
                  HI      
 
                AN      
 
                A    
 
                     
 
               
 
               
 
               
 
               
 
               
 
               
 
               
 
              
 
 AL  00      03  04  5   90  30      EA  16  AR  Ac
 
 AR  78      -1  -1      .0          ST  82  NO  ti
 
 AZ  11      7-  7-      00          SI  20  LD  ve
 
 OL  08      20  20                  DE           
 
 AM  90      10  10                         RI    
 
  2  5                               PH      CH    
 
                                    AR      AR    
 
 MG                                  MA      D     
 
                                    CY      W     
 
 TA                                             
 
 BL                         OF            
 
 ET                                   
 
                         CY      
 
                         NT      
 
                  HI      
 
                AN      
 
                A       
 
                       
 
                  
 
                  
 
                  
 
               
 
               
 
               
 
               
 
              
 
     59      10  04  11  8.  20      EA  14  AR  Ac
 
     31      -2  -1      50          ST  82  NO  ti
 
     00      3-  6-      0           SI  09  LD  ve
 
     57      20  20                  DE           
 
     92      09  10                         RI    
 
     0                               PH      CH    
 
                                     AR      AR    
 
                                     MA      D     
 
                                     CY      W     
 
                                                 
 
                            OF            
 
                                     
 
                         CY      
 
                         NT      
 
                  HI      
 
                AN      
 
                A       
 
                       
 
               
 
               
 
               
 
               
 
               
 
               
 
               
 
              
 
 TI  00      03  03  1   90  30      EA  16  GI  Ac
 
 ZA  18      -3  -3      .0          ST  99  LB  ti
 
 NI  54      0-  0-      00          SI  57  ER  ve
 
 DI  40      20  20                  DE      T     
 
 NE  02      10  10                         [place]    
 
    3                               PH      HN    
 
 HC                                  AR       W    
 
 L                                   MA            
 
 4                                   CY            
 
 MG                                             
 
                           OF            
 
 TA                                   
 
 BL                      CY      
 
 ET                      NT      
 
                  HI      
 
                AN      
 
                A    
 
                     
 
                  
 
                  
 
                  
 
                  
 
                  
 
               
 
               
 
              
 
     00      03  03  1   15  25      EA  16  GI  Ac
 
     59      -2  -2      0.          ST  98  LB  ti
 
     10      9-  9-      00          SI  21  ER  ve
 
     38      20  20      0           DE      T     
 
     50      10  10                         [place]    
 
     1                               PH      HN    
 
                                     AR       W    
 
                                     MA            
 
                                     CY            
 
                                                
 
                            OF            
 
                                      
 
                           CY      
 
                         NT      
 
                  HI      
 
                AN      
 
                A    
 
                     
 
               
 
               
 
               
 
               
 
               
 
               
 
               
 
              
 
 AL  00      03  03  5   90  30      EA  16  AR  Ac
 
 AR  78      -1  -1      .0          ST  82  NO  ti
 
 AZ  11      7-  7-      00          SI  20  LD  ve
 
 OL  08      20  20                  DE           
 
 AM  90      10  10                         RI    
 
  2  5                               PH      CH    
 
                                    AR      AR    
 
 MG                                  MA      D     
 
                                    CY      W     
 
 TA                                             
 
 BL                         OF            
 
 ET                                   
 
                         CY      
 
                         NT      
 
                  HI      
 
                AN      
 
                A       
 
                       
 
                  
 
                  
 
                  
 
               
 
               
 
               
 
               
 
              
 
 TI  00      03  03  0   60  30      EA  16  MI  Ac
 
 ZA  18      -0  -0      .0          ST  57  CK  ti
 
 NI  54      1-  1-      00          SI  54     ve
 
 DI  40      20  20                  DE      GR    
 
 NE  02      10  10                         EG    
 
    3                               PH      OR    
 
 HC                                  AR      Y     
 
 L                                   MA      E     
 
 4                                   CY            
 
 MG                                             
 
                           OF            
 
 TA                                   
 
 BL                      CY      
 
 ET                      NT      
 
                  HI      
 
                AN      
 
                A      
 
                     
 
                  
 
                  
 
                  
 
                  
 
                  
 
               
 
               
 
              
 
 LI  63      03  03  5   60  30      EA  16  MI  Ac
 
 DO  48      -0  -0      .0          ST  57  CK  ti
 
 DE  10      1-  1-      00          SI  56     ve
 
 RM  68      20  20                  DE      GR    
 
    70      10  10                         EG    
 
 5%  6                               PH      OR    
 
                                    AR      Y     
 
 PA                                  MA      E     
 
 TC                                  CY            
 
 H                                              
 
                            OF            
 
                                      
 
                         CY      
 
                         NT      
 
                  HI      
 
                AN      
 
                A      
 
                     
 
                 
 
               
 
               
 
               
 
               
 
               
 
               
 
              
 
 AL  67      02  02  00  90  30      EA  16  AR  Ac
 
 AR  25      -1  -2      .0          ST  43  NO  ti
 
 AZ  30      9-  6-      00          SI  25  LD  ve
 
 OL  90      20  20                  DE           
 
 AM  11      10  10                         RI    
 
    1                               PH      CH    
 
 0.                                  AR      AR    
 
 5                                   MA      D     
 
 MG                                  CY      W     
 
                                                
 
 TA                         OF            
 
 BL                         CY         
 
 ET                      NT      
 
                         HI      
 
                  AN      
 
                A       
 
                        
 
                       
 
                  
 
                  
 
                  
 
                  
 
               
 
               
 
              
 
     59      10  12  01  8.  15      EA  14  AR  Ac
 
     31      -2  -0      50          ST  82  NO  ti
 
     00      3-  3-      0           SI  09  LD  ve
 
     57      20  20                  DE           
 
     92      09  09                         RI    
 
     0                               PH      CH    
 
                                     AR      AR    
 
                                     MA      D     
 
                                     CY      W     
 
                                                 
 
                            OF            
 
                           CY         
 
                         NT      
 
                         HI      
 
                  AN      
 
                A       
 
                        
 
                       
 
               
 
               
 
               
 
               
 
               
 
               
 
              
 
     59      10  11  00  8.  15      EA  14  AR  Ac
 
     31      -2  -0      50          ST  82  NO  ti
 
     00      3-  5-      0           SI  09  LD  ve
 
     57      20  20                  DE           
 
     92      09  09                         RI    
 
     0                               PH      CH    
 
                                     AR      AR    
 
                                     MA      D     
 
                                     CY      W     
 
                                                 
 
                            OF            
 
                           CY         
 
                         NT      
 
                         HI      
 
                  AN      
 
                A       
 
                        
 
                       
 
               
 
               
 
               
 
               
 
               
 
               
 
              
 
 AZ  00      10  10  00  6.  6       EA  14  AR  Ac
 
 IT  09      -0  -2      00          ST  56  NO  ti
 
 HR  37      5-  2-      0           SI  82  LD  ve
 
 OM  14      20  20                  DE           
 
 YC  61      09  09                         RI    
 
 IN  8                               PH      CH    
 
                                    AR      AR    
 
 25                                  MA      D     
 
 0                                   CY      W     
 
 MG                                             
 
                           OF            
 
 TA                        CY         
 
 BL                      NT      
 
 ET                      HI      
 
                  AN      
 
                A       
 
                        
 
                       
 
                  
 
                  
 
                  
 
                  
 
                  
 
               
 
              
 
     00      10  10  00  60  20      EA  14  AR  Ac
 
     25      -0  -2      .0          ST  56  NO  ti
 
     83      5-  2-      00          SI  81  LD  ve
 
     67      20  20                  DE           
 
     80      09  09                         RI    
 
     1                               PH      CH    
 
                                     AR      AR    
 
                                     MA      D     
 
                                     CY      W     
 
                                                 
 
                            OF            
 
                            CY         
 
                         NT      
 
                         HI      
 
                  AN      
 
                A       
 
                        
 
                       
 
               
 
               
 
               
 
               
 
               
 
               
 
              
 
 IN  00      07  10  02  60  20      EA  13  MO  Ac
 
 DO  78      -1  -2      .0          ST  52  SE  ti
 
 ME  12      7-  2-      00          SI  24  S   ve
 
 TH  32      20  20                  DE      ST    
 
 AC  51      09  09                         EP    
 
 IN  0                               PH      HE    
 
                                    AR      N     
 
 25                                  MA      A     
 
                                    CY            
 
 MG                                              
 
                           OF            
 
 CA                         CY         
 
 PS                      NT      
 
 UL                      HI      
 
 E                AN      
 
                A       
 
                       
 
                     
 
                  
 
                  
 
                  
 
                  
 
                  
 
                 
 
              
 
 AM  00      09  10  00  15  5       EA  14  HE  Ac
 
 OX  78      -2  -0      .0          ST  43  ND  ti
 
 IC  12      5-  8-      00          SI  14  ER  ve
 
 IL  61      20  20                  DE      SO    
 
 LI  30      09  09                         N     
 
 N   5                               PH      RO    
 
 50                                  AR      BE    
 
 0                                   MA      RT    
 
 MG                                  CY       W    
 
                                               
 
 CA                         OF            
 
 PS                         CY         
 
 UL                      NT      
 
 E                       HI      
 
                  AN      
 
                A       
 
                        
 
                        
 
                  
 
                  
 
                  
 
                  
 
                 
 
               
 
              
 
     00      09  10  00  20  3       EA  14  HE  Ac
 
     05      -2  -0      .0          ST  43  ND  ti
 
     44      5-  8-      00          SI  13  ER  ve
 
     65      20  20                  DE      SO    
 
     02      09  09                         N     
 
     9                               PH      RO    
 
                                     AR      BE    
 
                                     MA      RT    
 
                                     CY       W    
 
                                                
 
                            OF            
 
                            CY         
 
                         NT      
 
                         HI      
 
                  AN      
 
                A       
 
                        
 
                        
 
               
 
               
 
               
 
               
 
               
 
               
 
              
 
 IN  00      07  09  01  60  20      EA  13  MO  Ac
 
 DO  78      -1  -1      .0          ST  52  SE  ti
 
 ME  12      7-  0-      00          SI  24  S   ve
 
 TH  32      20  20                  DE      ST    
 
 AC  51      09  09                         EP    
 
 IN  0                               PH      HE    
 
                                    AR      N     
 
 25                                  MA      A     
 
                                    CY            
 
 MG                                              
 
                           OF            
 
 CA                         CY         
 
 PS                      NT      
 
 UL                      HI      
 
 E                AN      
 
                A       
 
                       
 
                     
 
                  
 
                  
 
                  
 
                  
 
                  
 
                 
 
              
 
 IN  00      07  07  00  60  20      EA  13  MO  Ac
 
 DO  78      -1  -3      .0          ST  52  SE  ti
 
 ME  12      7-  0-      00          SI  24  S   ve
 
 TH  32      20  20                  DE      ST    
 
 AC  51      09  09                         EP    
 
 IN  0                               PH      HE    
 
                                    AR      N     
 
 25                                  MA      A     
 
                                    CY            
 
 MG                                              
 
                           OF            
 
 CA                         CY         
 
 PS                      NT      
 
 UL                      HI      
 
 E                AN      
 
                A       
 
                       
 
                     
 
                  
 
                  
 
                  
 
                  
 
                  
 
                 
 
              
 
 IN  00      06  07  01  30  10      EA  13  MO  Ac
 
 DO  78      -1  -1      .0          ST  15  SE  ti
 
 ME  12      6-  6-      00          SI  69  S   ve
 
 TH  32      20  20                  DE      ST    
 
 AC  51      09  09                         EP    
 
 IN  0                               PH      HE    
 
                                    AR      N     
 
 25                                  MA      A     
 
                                    CY            
 
 MG                                              
 
                           OF            
 
 CA                         CY         
 
 PS                      NT      
 
 UL                      HI      
 
 E                AN      
 
                A       
 
                       
 
                     
 
                  
 
                  
 
                  
 
                  
 
                  
 
                 
 
              
 
 IN  00      06  07  00  30  10      EA  13  MO  Ac
 
 DO  78      -1  -0      .0          ST  15  SE  ti
 
 ME  12      6-  2-      00          SI  69  S   ve
 
 TH  32      20  20                  DE      ST    
 
 AC  51      09  09                         EP    
 
 IN  0                               PH      HE    
 
                                    AR      N     
 
 25                                  MA      A     
 
                                    CY            
 
 MG                                              
 
                           OF            
 
 CA                         CY         
 
 PS                      NT      
 
 UL                      HI      
 
 E                AN      
 
                A       
 
                       
 
                     
 
                  
 
                  
 
                  
 
                  
 
                  
 
                 
 
              
 
     00      06  07  00  60  20      EA  13  MO  Ac
 
     55      -1  -0      .0          ST  15  SE  ti
 
     50      6-  2-      00          SI  70  S   ve
 
     16      20  20                  DE      ST    
 
     30      09  09                         EP    
 
     2                               PH      HE    
 
                                     AR      N     
 
                                     MA      A     
 
                                     CY            
 
                                                 
 
                            OF            
 
                            CY         
 
                         NT      
 
                         HI      
 
                  AN      
 
                A       
 
                       
 
                     
 
               
 
               
 
               
 
               
 
               
 
               
 
              
 
     00      06  06  00  15  3       EA  13  HE  Ac
 
     59      -0  -1      .0          ST  00  ND  ti
 
     10      4-  8-      00          SI  77  ER  ve
 
     38      20  20                  DE      SO    
 
     50      09  09                         N     
 
     1                               PH      RO    
 
                                     AR      BE    
 
                                     MA      RT    
 
                                     CY       W    
 
                                                
 
                            OF            
 
                            CY         
 
                         NT      
 
                         HI      
 
                  AN      
 
                A       
 
                        
 
                        
 
               
 
               
 
               
 
               
 
               
 
               
 
              
 
     00      05  05  00  20  3       EA  12  HE  Ac
 
     59      -0  -2      .0          ST  65  ND  ti
 
     10      7-  1          SI  12  ER  ve
 
     38      20  20                  DE      SO    
 
     50      09  09                         N     
 
     1                               PH      RO    
 
                                     AR      BE    
 
                                     MA      RT    
 
                                     CY       W    
 
                                                
 
                            OF            
 
                            CY         
 
                         NT      
 
                         HI      
 
                  AN      
 
                A       
 
                        
 
                        
 
               
 
               
 
               
 
               
 
               
 
               
 
              
 
 AR  00      05  05  00  12  2       EA  12  HE  Ac
 
 OM  78      -0  -2      .0          ST  65  ND  ti
 
 ET  11      7-            SI  13  ER  ve
 
 HA  83      20  20                  DE      SO    
 
 ZI  01      09  09                         N     
 
 NE  0                               PH      RO    
 
                                    AR      BE    
 
 25                                  MA      RT    
 
                                    CY       W    
 
 MG                                             
 
                           OF            
 
 TA                         CY         
 
 BL                      NT      
 
 ET                      HI      
 
                  AN      
 
                A       
 
                        
 
                        
 
                  
 
                  
 
                  
 
                  
 
                  
 
               
 
              
 
     00      05  05  00  15  3       EA  12  HE  Ac
 
     59      -0  -2      .0          ST  67  ND  ti
 
     10      8-  1          SI  12  ER  ve
 
     54      20  20                  DE      SO    
 
     00      09  09                         N     
 
     1                               PH      RO    
 
                                     AR      BE    
 
                                     MA      RT    
 
                                     CY       W    
 
                                                
 
                            OF            
 
                            CY         
 
                         NT      
 
                         HI      
 
                  AN      
 
                A       
 
                        
 
                        
 
               
 
               
 
               
 
               
 
               
 
               
 
              
 
 AM  00      05  05  00  30  10      EA  12  ST  Ac
 
 OX  78      -0  -2      .0          ST  60  EP  ti
 
 IC  12      4-  1-      00          SI  38  HE  ve
 
 IL  61      20  20                  DE      NS    
 
 LI  30      09  09                              
 
 N   5                               PH      KE    
 
 50                                  AR      VI    
 
 0                                   MA      N     
 
 MG                                  CY      C     
 
                                                
 
 CA                         OF            
 
 PS                         CY         
 
 UL                      NT      
 
 E                       HI      
 
                  AN      
 
                A       
 
                        
 
                       
 
                  
 
                  
 
                  
 
                  
 
                 
 
               
 
              
 
     00      04  04  00  20  5       EA  12  ST  Ac
 
     17      -1  -2      .0          ST  31  EP  ti
 
     26      3-  3-      00          SI  07  HE  ve
 
     35      20  20                  DE      NS    
 
     97      09  09                              
 
     0                               PH      KE    
 
                                     AR      VI    
 
                                     MA      N     
 
                                     CY      C     
 
                                                
 
                            OF            
 
                            CY         
 
                         NT      
 
                         HI      
 
                  AN      
 
                A       
 
                        
 
                       
 
               
 
               
 
               
 
               
 
               
 
               
 
              
 
 AM  00      04  04  00  30  10      EA  12  ST  Ac
 
 OX  78      -0  -2      .0          ST  26  EP  ti
 
 IC  12      9-  3-      00          SI  87  HE  ve
 
 IL  61      20  20                  DE      NS    
 
 LI  30      09  09                              
 
 N   5                               PH      KE    
 
 50                                  AR      VI    
 
 0                                   MA      N     
 
 MG                                  CY      C     
 
                                                
 
 CA                         OF            
 
 PS                         CY         
 
 UL                      NT      
 
 E                       HI      
 
                  AN      
 
                A       
 
                        
 
                       
 
                  
 
                  
 
                  
 
                  
 
                 
 
               
 
              
 
     00      04  04  00  20  5       EA  12  ST  Ac
 
     60      -0  -2      .0          ST  26  EP  ti
 
     35      9-  3-      00          SI  86  HE  ve
 
     46      20  20                  DE      NS    
 
     82      09  09                              
 
     8                               PH      KE    
 
                                     AR      VI    
 
                                     MA      N     
 
                                     CY      C     
 
                                                
 
                            OF            
 
                            CY         
 
                         NT      
 
                         HI      
 
                  AN      
 
                A       
 
                        
 
                       
 
               
 
               
 
               
 
               
 
               
 
               
 
              
 
 CY  59      04  04  00  21  7       CL  19  WR  Ac
 
 CL  74      -1  -2      .0          IN  16  IG  ti
 
 OB  60      3-  3-      00          IC  91  HT  ve
 
 EN  17      20  20                              
 
 ZA  70      09  09                  PH      AR    
 
 AR  6                               AR      DY    
 
 IN                                  MA       C    
 
 E                                   CY            
 
 10                                                
 
                                                
 
 MG                                       
 
                                      
 
 TA                              
 
 BL                              
 
 ET                       
 
                        
 
                     
 
                  
 
               
 
               
 
               
 
               
 
               
 
               
 
     00      04  04  00  21  7       CL  19  WR  Ac
 
     55      -1  -2      .0          IN  16  IG  ti
 
     51      3-  3-      00          IC  92  HT  ve
 
     88      20  20                              
 
     30      09  09                  PH      AR    
 
     2                               AR      DY    
 
                                     MA       C    
 
                                     CY            
 
                                                   
 
                                                 
 
                                          
 
                                       
 
                                 
 
                                 
 
                          
 
                        
 
                     
 
                  
 
     68      04  04  00  20  10      CL  19  WR  Ac
 
     77      -1  -2      .0          IN  16  IG  ti
 
     40      3-  3-      00          IC  90  HT  ve
 
     12      20  20                              
 
     26      09  09                  PH      AR    
 
     0                               AR      DY    
 
                                     MA       C    
 
                                     CY            
 
                                                   
 
                                                  
 
                                          
 
                                       
 
                                 
 
                                 
 
                          
 
                        
 
                     
 
                  
 
                                                                                
                                                                                
                                                                                
                                                                                
                                                                                
                                                                                
                      
 
Vital Signs
                      2013 17:32            
 
 
 Name         Value        Interpretat  Reference   Comment    
 
                           ion          Range                   
 
                               
 
      
 
 Body   98.7 [degF]                                       
 
 Temperature                                                    
 
                                                            
 
                      
 
 BP   108 mm[Hg]                                        
 
 Diastolic                                                      
 
                                                           
 
                    
 
 BP Systolic  174 mm[Hg]                                        
 
                                                                
 
                                                       
 
              
 
 Heart   78 /min                                           
 
 Rate/Pulse                                                     
 
                                                        
 
                     
 
 O2%          98 %                                              
 
                                                               
 
                                        
 
              
 
 Respiratory  18 /min                                           
 
  Rate                                                          
 
                                                        
 
                
 
            2013 16:15            
 
 
 Name         Value        Interpretat  Reference   Comment    
 
                           ion          Range                   
 
                               
 
      
 
 BP   98 mm[Hg]                                         
 
 Diastolic                                                      
 
                                                          
 
                    
 
 BP Systolic  161 mm[Hg]                                        
 
                                                                
 
                                                       
 
              
 
 Heart   85 /min                                           
 
 Rate/Pulse                                                     
 
                                                        
 
                     
 
 O2%          95 %                                              
 
                                                               
 
                                        
 
              
 
 Respiratory  20 /min                                           
 
  Rate                                                          
 
                                                        
 
                
 
                                                                                
                           
 
Results
                      
 
 
 Labs                
 
 
 
 
 Lab   Lab   Date    Result  Refere  Interp  Status  Commen
 
 Order   Detail                  nces   retati          t     
 
                                 Range   on                    
 
                                                            
 
                                  
 
                
 
 
 
 
 COMPREHENSIVE METABOLIC PANEL (2013 16:11)
 
                 
 
 
 
 
          Glucose    100               complet
 
             013   mg/dL                      ed     
 
        Bld-mCn  16:11                                      
 
  c                                            
 
                                 
 
                 
 
          BUN     8 mg/dL  7-18              complet
 
          Bld-mCn  013                              ed     
 
        c        16:11                                      
 
                                          
 
                            
 
           
 
          Creat     0.9   0.6-1.0           complet
 
          SerPl-m  013   mg/dL                      ed     
 
        Cnc      16:11                                      
 
                                                
 
                                 
 
           
 
          GFR     68   59-               complet
 
          (ESTIMA  013   ML/MIN                     ed     
 
        EVELINE)     16:11                                      
 
                                           
 
                                  
 
             
 
          Sodium     139   136-145           complet
 
          SerPl-s  013   mmoL/L                     ed     
 
        Cnc      16:11                                      
 
                                                
 
                                  
 
           
 
          Potassi    4.0   3.5-5.1           complet
 
          um   013   mmoL/L                     ed     
 
        SerPl-s  16:11                                      
 
  Cnc                                           
 
                                  
 
                   
 
          Chlorid  03-2  105               complet
 
          e   013   mmoL/L                     ed     
 
        SerPl-s  16:11                                      
 
  Cnc                                          
 
                                  
 
                   
 
          CO2   03-2  29   21.0-32           complet
 
          SerPl-s  013   mmoL/L   .0                ed     
 
        Cnc      16:11                                      
 
                                                  
 
                                  
 
           
 
          Calcium  0703-2  8.9   8.5-10.           complet
 
             013   mg/dL    1                 ed     
 
        SerPl-m  16:11                                      
 
  Cnc                                            
 
                                 
 
                   
 
          Prot   03-2  7.5   6.4-8.2           complet
 
          SerPl-m  013   gm/dL                      ed     
 
        Cnc      16:11                                      
 
                                                
 
                                 
 
           
 
          Albumin  07-03-2  3.5   3.4-5.0           complet
 
             013   gm/dL                      ed     
 
        SerPl-m  16:11                                      
 
  Cnc                                           
 
                                 
 
                   
 
          Globuli  03-2  4.0   1.3-3.2           complet
 
          n   013   gm/dL                      ed     
 
        Ser-mCn  16:11                                      
 
  c                                             
 
                                 
 
                 
 
          Albumin  03-2  0.9 UNK  1.1-1.8           complet
 
          /Glob   013                              ed     
 
        SerPl-m  16:11                                      
 
  Rto                                        
 
                              
 
                   
 
          Bilirub  -2  0.6   0.2-1.0           complet
 
             013   mg/dL                      ed     
 
        SerPl-m  16:11                                      
 
  Cnc                                           
 
                                 
 
                   
 
          AST   0703-2  13 U/L   15-37             complet
 
          SerPl-c  013                              ed     
 
        Cnc      16:11                                      
 
                                          
 
                              
 
           
 
          ALT   07-03-2  33 U/L   30-65             complet
 
          SerPl-c  013                              ed     
 
        Cnc      16:11                                      
 
                                          
 
                              
 
           
 
          ALP   07-03-2  103 U/L              complet
 
          SerPl-c  013                              ed     
 
        Cnc      16:11                                      
 
                                            
 
                              
 
           
 
 
 
 
 CBC with AUTO DIFF (2013 16:11)
 
                 
 
 
 
 
          WBC #   07-03-2  6.4   4.8-10.           complet
 
          Bld   013   K/MM3    8                 ed     
 
        Auto     16:11                                      
 
                                                 
 
                                 
 
             
 
          RBC #   07-03-2  4.07   4.2-5.4           complet
 
          Bld   013   M/mm3                      ed     
 
        Auto     16:11                                      
 
                                                
 
                                 
 
             
 
          Hgb   07-03-2  12.1   12.2-16           complet
 
          Bld-mCn  013   g/dL     .2                ed     
 
        c        16:11                                      
 
                                                  
 
                              
 
           
 
          Hct Fr   03-2  37.2 %   37.0-47           complet
 
          Bld      013            .0                ed     
 
                 16:11                                      
 
                                              
 
                      
 
           
 
          MCV RBC  -03-2  91.4 fl  82.2-97           complet
 
                   013            .8                ed     
 
                 16:11                                      
 
                                            
 
                      
 
           
 
          MCH RBC  03-2  29.8 pg  27-31.2           complet
 
           Qn   013                              ed     
 
        Auto     16:11                                      
 
                                             
 
                              
 
             
 
          MEAN   07-03-2  32.6   31.8-35           complet
 
          CORPUSC  013   g/dl     .4                ed     
 
        ULAR   16:11                                      
 
  HGB                                          
 
  CONC                          
 
                   
 
              
 
        
 
          RDW RBC  07-03-2  14.5 %   11.5-17           complet
 
           Auto    013            .5                ed     
 
                 16:11                                      
 
                                              
 
                         
 
           
 
          Platele  07-03-2  297   142-424           complet
 
          t Bld   013   K/mm3                      ed     
 
        Ql   16:11                                      
 
  Manual                                        
 
                                 
 
                   
 
          
 
          MEAN   -03-2  7.6 fl   7.4-10.           complet
 
          PLATELE  013            4                 ed     
 
        T   16:11                                      
 
  VOLUME                                     
 
                              
 
                   
 
          
 
          Granulo  07-03-2  71.8 %   37.0-80           complet
 
          cytes   013            .0                ed     
 
        Fr Bld   16:11                                      
 
  Auto                                        
 
                              
 
                   
 
        
 
          LYMPH %  07-03-2  20.9 %   10-50.0           complet
 
                   013                              ed     
 
                 16:11                                      
 
                                         
 
                      
 
           
 
          Monocyt  07-03-2  5.7 %    1.7-9.3           complet
 
          es Fr   013                              ed     
 
        Bld   16:11                                      
 
  Auto                                     
 
                              
 
                   
 
        
 
          Eosinop  07-03-2  1.3 %    0.1-12.           complet
 
          hil Fr   013            0                 ed     
 
        Bld   16:11                                      
 
  Auto                                      
 
                              
 
                   
 
        
 
          Basophi  07-03-2  0.3 %    0.1-2.0           complet
 
          ls Fr   013                              ed     
 
        Bld   16:11                                      
 
  Auto                                     
 
                              
 
                   
 
        
 
          Granulo  07-03-2  4.6   1.8-7.8           complet
 
          cytes #  013   K/mm3                      ed     
 
         Bld   16:11                                      
 
  Auto                                          
 
                                 
 
                   
 
        
 
          Lymphoc  07-03-2  1.3   0.7-4.5           complet
 
          ytes Fr  013   K/mm3                      ed     
 
         Bld   16:11                                      
 
  Auto                                          
 
                                 
 
                   
 
        
 
          Monocyt  07-03-2  0.4   0.1-1.0           complet
 
          es #   013   K/mm3                      ed     
 
        Bld   16:11                                      
 
  Auto                                          
 
                                 
 
                   
 
        
 
          Eosinop  07-03-2  0.1   0.0-0.4           complet
 
          hil #   013   K/mm3                      ed     
 
        Bld   16:11                                      
 
  Auto                                          
 
                                 
 
                   
 
        
 
          Basophi  07-03-2  0.0   0-0.2             complet
 
          ls #   013   K/MM3                      ed     
 
        Bld   16:11                                      
 
  Auto                                        
 
                                 
 
                   
 
        
 
 
 
 
 B-HCG Ur Ql (2013 15:51)
 
                 
 
 
 
 
          B-HCG   -03-2  NEGATIV  NEG               complet
 
          Ur Ql    013   E                          ed     
 
                 15:51                                      
 
                                         
 
                         
 
           
 
 
 
 
 URINALYSIS/COMPLETE (2013 15:51)
 
                 
 
 
 
 
          URINE   -03-2  YELLOW   YELLOW            complet
 
          COLOR    013                              ed     
 
                 15:51                                      
 
                                           
 
                         
 
           
 
          URINE   -2  CLEAR    CLEAR             complet
 
          APPEARA  013                              ed     
 
        NCE      15:51                                      
 
                                         
 
                              
 
           
 
          URINE   --2  NEGATIV  NEG               complet
 
          GLUCOSE  013   E                          ed     
 
         -   15:51                                      
 
  DIPSTIC                                
 
  K                           
 
                   
 
            
 
          URINE   07-03-2  NEGATIV  NEG               complet
 
          BILIRUB  013   E                          ed     
 
        IN -   15:51                                      
 
  DIPSTIC                                
 
  K                           
 
                   
 
            
 
          URINE   07-03-2  NEGATIV  NEG               complet
 
          KETONE   013   E mg/dL                    ed     
 
                 15:51                                      
 
                                           
 
                               
 
           
 
          URINE   07-03-2  1.020   1.005-1           complet
 
          SPECIFI  013   UNK      .030              ed     
 
        C   15:51                                      
 
  GRAVITY                                          
 
                                
 
                   
 
           
 
          URINE   07-03-2  TRACE-I  NEG               complet
 
          BLOOD    013   NTACT                      ed     
 
                 15:51                                      
 
                                           
 
                            
 
           
 
          URINE   07-03-2  6.0 UNK  5.0-8.5           complet
 
          PH       013                              ed     
 
                 15:51                                      
 
                                            
 
                      
 
           
 
          URINE   07-03-2  NEGATIV  NEG               complet
 
          PROTEIN  013   E mg/dL                    ed     
 
         -   15:51                                      
 
  DIPSTIC                                  
 
  K                                
 
                   
 
            
 
          URINE   07-03-2  0.2   NEG               complet
 
          UROBILI  013   E.U./dL                    ed     
 
        NOGEN -  15:51                                      
 
                                     
 
  DIPSTIC                          
 
  K                
 
              
 
            
 
          URINE   07-03-2  NEGATIV  NEG               complet
 
          NITRATE  013   E                          ed     
 
         -   15:51                                      
 
  DIPSTIC                                
 
  K                           
 
                   
 
            
 
          URINE   07-03-2  1+       NEG               complet
 
          LEUK   013                              ed     
 
        ESTERAS  15:51                                     
 
  E                                
 
                              
 
                 
 
          URINE   07-03-2  3-5   0                 complet
 
          RBC      013   rbc/hpf                    ed     
 
                 15:51                                    
 
                                           
 
                           
 
           
 
          URINE   07-03-2  5-10   O                 complet
 
          WBC      013   wbc/hpf                    ed     
 
                 15:51                                    
 
                                           
 
                           
 
           
 
          URINE   07-03-2  5-10   0-5               complet
 
          SQUAMOU  013   #/hpf                      ed     
 
        S CELLS  15:51                                      
 
                                           
 
                                 
 
                
 
          URINE   07-03-2  2+       O                 complet
 
          BACTERI  013                              ed     
 
        A        15:51                                   
 
                                   
 
                            
 
           
 
                                                                                
                                                                                
                                                                                
                                                                                
                                                                                
                                                                                
                                                                                
                                                                       
 
Procedures
                      
 
 
 Procedure  DOS        Code       Location   Performer  Comment  
 
                                                                 
 
                                                 
 
 INCISION     14440      [facility]   [facility]             
 
 &                        PHYSICIAN                     
 
 DRAINAGE                     S, PLLC            
 
 ABSCESS                  
 
 COMPLICAT             
 
 ED/MULTIP     
 
 LE            
 
               
 
        
 
 HEPATITIS    29382      [facility]            
 
  C   5                     [facility]           
 
 ANTIBODY                     INC        INC       
 
                                     
 
                      
 
 CULTURE     39052      [facility]            
 
 BACTERIAL  5                     [facility]           
 
  BLOOD                     INC        INC       
 
 AEROBIC                            
 
 W/ID              
 
 ISOLATES      
 
               
 
              
 
 IV     72215      [facility]            
 
 INFUSION   5                     [facility]           
 
 THERAPY/P                    INC        INC       
 
 ROPHYLAXI                           
 
 S /DX 1ST             
 
  TO 1 HR      
 
               
 
              
 
 COMPREHEN    12487      [facility]            
 
 SIVE   5                     [facility]           
 
 METABOLIC                    INC        INC       
 
  PANEL                              
 
                       
 
            
 
 HEPATITIS    83423      [facility]            
 
  A   5                     [facility]           
 
 ANTIBODY                     INC        INC       
 
 HAAB                                
 
                       
 
          
 
 BLOOD     51328      [facility]            
 
 COUNT   5                     [facility]           
 
 COMPLETE                     INC        INC       
 
 AUTO&AUTO                           
 
  DIFRNTL              
 
 WBC           
 
               
 
         
 
 [place]     [address]      [facility]            
 
 LACTATE    5                     [facility]           
 
                              INC        INC       
 
                                   
 
             
 
 HEPATITIS    44146      [facility] MELENDREZ CORE   5                     [facility]           
 
 ANTIBODY                     INC        INC       
 
 HBCAB                            
 
 TOTAL                 
 
               
 
           
 
 HEPATITIS    43541      [facility] PINAON            
 
  PARVIN SURF   5                     [facility]           
 
 ANTIBODY                     INC        INC       
 
 HBSAB                               
 
                       
 
           
 
 [place]     [address]      [facility]            
 
 HEPATITIS  5                     [facility]           
 
  B                     INC        INC       
 
 SURFACE                            
 
 ANTIGEN               
 
               
 
             
 
 [place]   10-  25507      [facility]            
 
 THYROID   5                     [facility]           
 
 STIMULATI                    INC        INC       
 
 NG                            
 
 HORMONE              
 
 TSH           
 
               
 
         
 
 [place]   10-  17412      [facility]            
 
 THYROXINE  5                     [facility]           
 
  TOTAL                       INC        INC       
 
                                     
 
                    
 
 COLLECTIO  10-  93957      [facility]            
 
 N VENOUS   5                     [facility]           
 
 BLOOD                     INC        INC       
 
 VENIPUNCT                           
 
 URE                   
 
               
 
         
 
 BLOOD   10-  55883      [facility]            
 
 COUNT   5                     [facility]           
 
 COMPLETE                     INC        INC       
 
 AUTO&AUTO                           
 
  DIFRNTL              
 
 WBC           
 
               
 
         
 
 COMPREHEN  10-  22815      [facility]            
 
 SIVE   5                     [facility]           
 
 METABOLIC                    INC        INC       
 
  PANEL                              
 
                       
 
            
 
 HEMOGLOBI  10-  82301      [facility]            
 
 N   5                     [facility]           
 
 GLYCOSYLA                    INC        INC       
 
 EVELINE A1C                             
 
                       
 
             
 
 BLOOD     92808      [facility]            
 
 COUNT   5                     [facility]           
 
 COMPLETE                     INC        INC       
 
 AUTO&AUTO                           
 
  DIFRNTL              
 
 WBC           
 
               
 
         
 
 HEPATITIS    66215      [facility]            
 
  C   5                     [facility]           
 
 ANTIBODY                     INC        INC       
 
                                     
 
                      
 
 COMPREHEN    65180      [facility]            
 
 SIVE   5                     [facility]           
 
 METABOLIC                    INC        INC       
 
  PANEL                              
 
                       
 
            
 
 HEPATITIS    10058      [facility]            
 
  A   5                     [facility]           
 
 ANTIBODY                     INC        INC       
 
 HAAB                                
 
                       
 
          
 
 COLLECTIO    30731      [facility]            
 
 N VENOUS   5                     [facility]           
 
 BLOOD                     INC        INC       
 
 VENIPUNCT                           
 
 URE                   
 
               
 
         
 
 THROMBOPL    52500      [facility]            
 
 ASTIN   5                     [facility]           
 
 TIME                     INC        INC       
 
 PARTIAL                            
 
 PLASMA/WH             
 
 OLE BLOOD     
 
               
 
               
 
 INF AGT           [facility]            
 
 AB DETECT  5                     [facility]           
 
  EIA TECH                    INC        INC       
 
                             
 
 HIV-1&/HI             
 
 V-2 SCR       
 
               
 
             
 
 HEPATITIS    91318      [facility] MELENDREZ CORE   5                     [facility]           
 
 ANTIBODY                     INC        INC       
 
 HBCAB                            
 
 TOTAL                 
 
               
 
           
 
 HEPATITIS    40787      [facility]   [facility]            
 
  B SURF   5                     [facility]           
 
 ANTIBODY                     INC        INC       
 
 HBSAB                               
 
                       
 
           
 
 [place]     [address]      [facility]            
 
 THYROID   5                     [facility]           
 
 STIMULATI                    INC        INC       
 
 NG                            
 
 HORMONE              
 
 TSH           
 
               
 
         
 
 [place]     [address]      [facility]            
 
 AMMONIA    5                     [facility]           
 
                              INC        INC       
 
                                   
 
             
 
 [place]     [address]      [facility] SANC[facility]            
 
 HEPATITIS  5                     [facility]           
 
  B                     INC        INC       
 
 SURFACE                            
 
 ANTIGEN               
 
               
 
             
 
 [place]     [address]      [facility] SANC[facility]            
 
 THYROXINE  5                     [facility]           
 
  TOTAL                       INC        INC       
 
                                     
 
                    
 
 PROTHROMB    21737      [facility]            
 
 IN TIME    5                     [facility]           
 
                              INC        INC       
 
                                   
 
             
 
 RADEX     70320      [facility]            
 
 SPINE   5                     MEDICAL                      
 
 LUMBOSACR                    IMAGING            
 
 AL 2/3       ASS        
 
 VIEWS                   
 
                   
 
           
 
 CT     [address]      [place]   BOND ALL            
 
 HEAD/BRAI  5                     MEDICAL                      
 
 N W/O                     IMAGING            
 
 CONTRAST       ASS        
 
 MATERIAL                
 
                   
 
              
 
 RADIOLOGI    02700      [place]   ELLIS ALL            
 
 C EXAM   5                     MEDICAL                      
 
 CHEST 2                     IMAGING            
 
 VIEWS       ASS        
 
 FRONTAL&L               
 
 ATERAL            
 
               
 
            
 
 INITIAL     75538      RENEE SADI RENEE SADI            
 
 INPATIENT  4                                                   
 
  CONSULT                                        
 
 NEW/ESTAB       
 
  PT 20      
 
 MIN           
 
               
 
         
 
 [place]     [address]      RAJESH   RAJESH            
 
 ABDOMEN &  4                     PAMELA        PAMELA                
 
  PELVIS                                          
 
 W/O                
 
 CONTRAST      
 
 MATERIAL      
 
               
 
              
 
 OPHTH     41423      FISH ARNOLD,            
 
 MEDICAL   0                     VISION     GENA M           
 
 XM&EVAL                                          
 
 COMPRHNSV                       
 
  ESTAB PT     
 
  1/>          
 
               
 
          
 
 TENS           EMPI INC   EMPI INC            
 
 DEVICE   0                                                   
 
 4/MORE                                        
 
 LEADS        
 
 MULTI      
 
 NERVE      
 
 STIMULATI     
 
 ON            
 
               
 
        
 
 APPLICATI    26721      [facility]            
 
 ON   0                     [facility]           
 
 MODALITY                     INC        INC       
 
 1/> AREAS                           
 
  HOT/COLD             
 
  PACKS        
 
               
 
            
 
 APPL     84712      [facility]            
 
 MODALITY   0                     [facility]           
 
 1/> AREAS                    INC        INC       
 
  ELEC                            
 
 STIMJ              
 
 UNATTENDE     
 
 D             
 
               
 
       
 
 THERAPEUT    44977      [facility]            
 
 IC PX 1/>  0                     [facility]           
 
  AREAS                     INC        INC       
 
 EACH 15                            
 
 MIN              
 
 EXERCISES     
 
               
 
               
 
 APPL     09764      [facility]            
 
 MODALITY   0                     [facility]           
 
 1/> AREAS                    INC        INC       
 
  ELEC                            
 
 STIMJ EA              
 
 15 MIN        
 
               
 
            
 
 APPL     32416      [facility]            
 
 MODALITY   0                     [facility]           
 
 1/> AREAS                    INC        INC       
 
  ELEC                            
 
 STIMJ              
 
 UNATTENDE     
 
 D             
 
               
 
       
 
 APPLICATI    83501      [facility]            
 
 ON   0                     [facility]           
 
 MODALITY                     INC        INC       
 
 1/> AREAS                           
 
  HOT/COLD             
 
  PACKS        
 
               
 
            
 
 THERAPEUT    77208      [facility]ANC[facility]            
 
 IC PX 1/>  0                     [facility]           
 
  AREAS                     INC        INC       
 
 EACH 15                            
 
 MIN              
 
 EXERCISES     
 
               
 
               
 
 THERAPEUT    32917      [facility]ANC[facility]            
 
 IC PX 1/>  0                     [facility]           
 
  AREAS                     INC        INC       
 
 EACH 15                            
 
 MIN              
 
 EXERCISES     
 
               
 
               
 
 PHYSICAL     29862      [facility]            
 
 THERAPY   0                     [facility]   [facility]           
 
 EVALUATIO                    INC        INC       
 
 N                                   
 
                       
 
       
 
 APPLICATI    28033      [facility]            
 
 ON   0                     [facility]           
 
 MODALITY                     INC        INC       
 
 1/> AREAS                           
 
  HOT/COLD             
 
  PACKS        
 
               
 
            
 
 APPL     92340      [facility]            
 
 MODALITY   0                     [facility]           
 
 1/> AREAS                    INC        INC       
 
  ELEC                            
 
 STIMJ              
 
 UNATTENDE     
 
 D             
 
               
 
       
 
 PSYCHOLOG    43211      PHYSICIAN  VERNON THOMAS   0                     S   TASH BOYKIN          
 
 TESTING                     SERVICES             
 
 ADMN BY       [facility]                  
 
 TECH AR                
 
 HR                
 
               
 
        
 
 RADEX     85539      NIDIA DUQUE,            
 
 SPINE   0                     MEDICAL   SHELLY GÓMEZ           
 
 LUMBOSACR                    IMAGING             
 
 AL       ASSOCIATE           
 
 MINIMUM 4    S          
 
  VIEWS                  
 
                 
 
            
 
 LEVEL III  10-  99478      ORAL   ORAL            
 
  SURG   9                     PATHOLOGY  PATHOLOGY          
 
 PATHOLOGY                         
 
        LABORATOR  LABORATOR 
 
 GROSS&[facility]    Y          Y         
 
 ROSCOPIC                          
 
 EXAM              
 
               
 
          
 
 ORTHOPANT    43402      SEBAS MULLEN           
 
 OGRAM      9                     DANIEL ROBERT W          W         
 
                                 
 
         
 
 THER     59345      SEBAS MULLEN           
 
 PROPH/DX   9                     DANIEL ROBERT           
 
 NJX IV                     W          W         
 
 PUSH                            
 
 SINGLE/1S         
 
 T      
 
 SBST/DRUG     
 
               
 
               
 
 BIOPSY OF    15608      SEBAS MULLEN           
 
  LIP       9                     , DANIEL SANCHEZ          W         
 
                                
 
         
 
 DEEP           SEBAS MULLEN           
 
 SEDATION/  DANIEL Ivy ROBERT           
 
 GENERAL                     W          W         
 
 ANESTHESI                           
 
 A-1ST 30          
 
 MINUTES       
 
               
 
             
 
 3D     74008      REYNA C  RAJESH,           
 
 RENDERING  9                      RAJESH   REYNA           
 
  W/INTERP                                         
 
  &                           
 
 POSTPROCE     
 
 SS      
 
 SUPERVISI     
 
 ON            
 
               
 
        
 
 MRI     35571      REYNA C  RAJESH,           
 
 SPINAL   9                      RAJESH   REYNA           
 
 CANAL                                          
 
 LUMBAR                           
 
 W/O      
 
 CONTRAST      
 
 MATERIAL      
 
               
 
              
 
 RADEX     50626      [facility]   [facility]            
 
 SPINE   9                     [facility]           
 
 LUMBOSACR                    INC        INC       
 
 AL                            
 
 MINIMUM 4             
 
  VIEWS        
 
               
 
            
 
                                                                                
                                                                                
                                                                                
                                                                                
                                                                                
                                                                                
                                                                                
                                                                                
                              
 
Encounters
                      
 
 
 Encounter  Start   End Date   Code       Location   Performer
 
  Type      Date                                                 
 
                                                        
 
                
 
 EMERGENCY    61079      [facility] RAMESH  
 
    7          7                     PHYSICIAN           
 
 BIBI                               S, PLLC         
 
 T VISIT                    
 
 HIGH/URGE             
 
 NT      
 
 SEVERITY      
 
               
 
              
 
 [facility]                [facility]            
 
 -   5          5                     [facility]            
 
 OUTPATIEN                                   INC                 
 
 T                                             
 
                   
 
       
 
 EMERGENCY    26511      [facility]            
 
    5          5                     [facility]            
 
 DEPARTMEN                               INC                 
 
 T VISIT                            
 
 MODERATE          
 
 SEVERITY      
 
               
 
              
 
 EMERGENCY    49920      [facility] ROMERO 
 
    5          5                     PHYSICIAN  [facility]      
 
 DEPARTMEN                               S, [facility]             
 
 T VISIT                      
 
 HIGH/URGE             
 
 NT      
 
 SEVERITY      
 
               
 
              
 
 HOSPITAL   10-  10-             [facility]            
 
 -   5          5                     [facility]            
 
 OUTPATIEN                                   INC                 
 
 T                                             
 
                   
 
       
 
 OFFICE     87155      [facility]   [facility] 
 
 OUTPATIEN  5          5                     PHYSICIAN  [facility]      
 
 T VISIT                                S GROUP             
 
 15                      
 
 MINUTES               
 
               
 
             
 
 HOSPITAL                [facility]            
 
 -   5          5                     [facility]            
 
 OUTPATIEN                                   INC                 
 
 T                                             
 
                   
 
       
 
 Emergency    MOHINI Soliz MD
 
 (ER)       3 15:50    3 17:45               [facility]            
 
                                                
 
                  
 
 OFFICE     14528      JOYCE COOK OUTPATIEN  0          0                     VINICIO COOPER
 
 T VISIT                                                    
 
 15                            
 
 MINUTES       
 
               
 
             
 
 OFFICE     39212      JOYCE COOK OUTPATIEN  0          0                     VINICIO COOPER
 
 T VISIT                                                    
 
 15                            
 
 MINUTES       
 
               
 
             
 
 OFFICE     53028      PHYSICIAN  VISHNU CRUZ  0          0                     S   SHAHLA COOPER   
 
 T VISIT                                SERVICES            
 
 25          PSC            
 
 MINUTES                 
 
                   
 
             
 
 OFFICE     33896      JOYCE COOK OUTPATIEN  0          0                     VINICIO COOPER
 
 T VISIT                                                    
 
 15                            
 
 MINUTES       
 
               
 
             
 
 HOSPITAL                [facility]            
 
 -   0          0                     [facility]            
 
 OUTPATIEN                                   INC                 
 
 T                                             
 
                   
 
       
 
 OFFICE     03352      PHYSICIAN  VISHNU THOMAS  0          0                     S   [facility]                                SERVICES            
 
 MINUTES           [facility]               
 
                         
 
                 
 
 HOSPITAL                [facility]            
 
 -   0          0                     [facility]            
 
 OUTPATIEN                                   INC                 
 
 T                                             
 
                   
 
       
 
 EMERGENCY    62393      [facility]            
 
    0          0                     [facility]            
 
 DEPARTMEN                               INC                 
 
 T VISIT                            
 
 LOW/MODER         
 
  SEVERITY     
 
               
 
               
 
 EMERGENCY    96314      MERLE ROMERO, 
 
    0          0                     EMERGENCY  [facility]
 
 DEPARTMEN                                SERVICES           
 
 T VISIT                    
 
 HIGH/URGE     ASSOCIATE 
 
 NT      S         
 
 SEVERITY                
 
                 
 
              
 
 OFFICE   10-  10-  94725      JOYCE COOK OUTPATIEN  9          9                     VINICIO COOPER
 
 T NEW 30                                                    
 
 MINUTES                             
 
               
 
             
 
 OFFICE     68670      [place]   CONSUELO, 
 
 CONSULTAT  9          9                     MEDICAL   [place] A
 
 ION                                SERV            
 
 NEW/ESTAB         FOUND[facility]         
 
  PATIENT                
 
 40 MIN                  
 
               
 
            
 
 OFFICE     65630      [facility]A REYES
 
 OUTPATIEN  9          9                     [facility] VISIT                                 [facility]                
 
 15                    
 
 MINUTES            
 
               
 
             
 
 OFFICE     [id number]      ASA REYESEN  9          9                     [facility] VISIT                                 [facility]                
 
 15                    
 
 MINUTES            
 
               
 
             
 
 H[facility]                [facility]            
 
 -   9          9                     [facility]            
 
 OUTPATIEN                                   [facility]                 
 
 T                                             
 
                   
 
       
 
 OFFICE     [id number]      ASA REYES
 
 OUTPATITRENTON  9          9                     [facility] VISIT                                 [facility]                
 
 15                    
 
 MINUTES            
 
               
 
             
 
 OFFICE     [id number]      ASA REYES  9          9                     [facility] VISIT                                 [facility]                
 
 15                    
 
 MINUTES            
 
               
 
             
 
 H[facility]                [facility]            
 
 -   9          9                     [facility]            
 
 OUTPATIEN                                   INC                 
 
 T

## 2017-10-07 NOTE — EXTERNAL MEDICAL SUMMARY RPT
Patient Summary
 Created on: 10/05/2017
 
BERYL MANN
: 68
Sex: Undifferentiated
 
Demographics
 
 
 
 Preferred Language  English
 
 Marital Status  Unknown
 
 Shinto Affiliation  Unknown
 
 Race  Unknown
 
 Ethnic Group  Unknown
 
 
Author
 
 
 
 Author            SAMIR
 
 Address  Unknown
 
 Phone  [email or web address]
 
                                                                
 
Immunization
                                    No patient found.

## 2017-10-07 NOTE — EXTERNAL MEDICAL SUMMARY RPT
Patient Summary
 Created on: 10/05/2017
 
BERYL MANN
External Reference #: [id number]
: 68
Sex: Female
 
Demographics
 
 
 
 Address  [address] [place]  [address]
 
 Preferred Language  English
 
 Marital Status  Unknown
 
 Rastafarian Affiliation  Unknown
 
 Race  Unknown
 
 Ethnic Group  Unknown
 
 
Author
 
 
 
 Author            ,            SAMIR DAWSON
 
 Address  Unknown
 
 Phone  [email or web address]
 
 
 
Care Team Providers
 
 
 
 Care Team Member Name  Role  Phone
 
 VINICIO COOK,   Unavailable  Unavailable
 
 VINICIO COOK   
 
 ELLIS ALL, ELLIS ALL   Unavailable  Unavailable
 
 [facility]   Un[facility]  Unavailable
 
 [facility]   
 
 RAJESH PAMELA,   Unavailable  Unavailable
 
 RAJESH PAMELA   
 
 RAJESH PAMELA,   Unavailable  Unavailable
 
 RAJESH PAMELA   
 
 RAJESH, REYNA,   Unavailable  Unavailable
 
 RAJESH, REYNA   
 
 [facility]  Unavailable  Unavailable
 
  [facility]  
 
    
 
 [facility]   Unavailable  Unavailable
 
 [place]   
 
 [facility] [place]  
 
    
 
 [facility]   Unavailable  Unavailable
 
 [place] [facility]  
 
  [facility] [place]  
 
    
 
 [facility]   Unavailable  Unavailable
 
 TREMAYNE BECKMANEY   Unavailable  Unavailable
 
 [facility]   
 
 PITO ROMERO,   Unavailable  Unavailable
 
 PITO ROMERO JOHN W,   Unavailable  Unavailable
 
 SHAHLA CRUZ   
 
 [facility]   Unavailable  Unavailable
 
 [facility]   
 
 [facility]   
 
 DANIEL MULLEN,   Unavailable  Unavailable
 
 DANIEL MULLEN   
 
 [facility],  Unavailable  Unavailable
 
  [facility]  
 
    
 
 [facility]   Unavailable  Unavailable
 
 [facility], [facility]   
 
 VÍCTOR GRAVES   Unavailable  Unavailable
 
 VÍCTOR GRAVES   Unavailable  Unavailable
 
 TASH THOMAS,   Unavailable  Unavailable
 
 TASH THOMAS EMMETT P,   Unavailable  Unavailable
 
 SHELLY DUQUE   
 
 [facility]   Unavailable  Unavailable
 
 [facility]   
 
 [facility]   
 
 [facility]   Unavailable  Unavailable
 
 [facility]   
 
 [facility]   
 
 RENUSCH, RENUSCH   Unavailable  Unavailable
 
 GENA ARNOLD,   Unavailable  Unavailable
 
 GENA ARNOLD PHILLIP A,   Unavailable  Unavailable
 
 ÁLVARO CORDERO A C, WRIGHT,   Unavailable  Unavailable
 
 ASA C   
 
                                            
 
Purpose
                      Continuity of Care Document - 2009 through 10-05-
2017                                                                            
                     
 
Problems
                      
 
 
 Code         Diagnosis    DOS          Provider     Status     
 
                                                               
 
               
 
 [id number]       CUTANEOUS   2017   [facility]              
 
              ABSCESS OF                PHYSICIANS,             
 
      RIGHT UPPER           PLLC                   
 
   LIMB                       
 
                  
 
      
 
 [id number]         ANEMIA   2015   [facility]              
 
              UNSPECIFIED               PHYSICIANS,             
 
                            PLLC                   
 
                          
 
      
 
 I10          ESSENTIAL   [facility]              
 
      HYPERTENSIO          INC                     
 
  N                          
 
             
 
 [id number]       CELLULITIS   2015   [facility]              
 
              OF RIGHT                PHYSICIANS,             
 
      UPPER LIMB            PLLC                   
 
                              
 
             
 
 R945         ABNORMAL   2015   [facility]              
 
              RESULTS OF                [facility]              
 
      LIVER           INC                     
 
  FUNCTION                 
 
  STUDIES       
 
                
 
        
 
 Z720         TOBACCO USE  2015   [facility]              
 
                                        [facility]              
 
                       INC                     
 
                 
 
 [id number]       UNSPECIFIED  10-   [facility]              
 
               ASTHMA                [facility]              
 
      UNCOMPLICAT          INC                     
 
  ED                         
 
              
 
 [id number]        ASTHMA,   2015   [facility]              
 
              UNSPECIFIED               PHYSICIANS              
 
      ,           GROUP                   
 
  UNSPECIFIED                 
 
   STATUS         
 
                
 
        
 
 23361        OTHER   2015   [facility]              
 
              MALAISE AND               PHYSICIANS              
 
       FATIGUE             GROUP                   
 
                              
 
           
 
 7905         OTHER   2015   [facility]              
 
              NONSPECIFIC               PHYSICIANS              
 
       ABNORMAL           GROUP                   
 
  SERUM                  
 
  ENZYME      
 
  LEVELS        
 
                
 
       
 
 73950        DEGEN   2015   [place]              
 
              LUMBAR/LUMB               MEDICAL              
 
      OSACRAL           IMAGING ASS             
 
  INTERVERTEB                 
 
  RAL DISC              
 
                
 
         
 
 7245         UNSPECIFIED  2015   [place]              
 
               BACKACHE                 MEDICAL              
 
                           IMAGING ASS             
 
                        
 
            
 
 7804         DIZZINESS   2015   [place]              
 
              AND                MEDICAL              
 
      GIDDINESS            IMAGING ASS             
 
                              
 
                  
 
 [id number]        SHORTNESS   2015   [place]              
 
              OF BREATH                 MEDICAL              
 
                           IMAGING ASS             
 
                        
 
            
 
 2859         UNSPECIFIED  2014   [facility]                
 
               ANEMIA                                           
 
                                          
 
          
 
 6202         OTHER AND   2014   [facility]              
 
              UNSPECIFIED               [facility]AMELA                     
 
     OVARIAN                                  
 
  CYST            
 
                
 
 3384         CHRONIC   2010   [facility],              
 
              PAIN                VINICIO W               
 
    SYNDROME                                     
 
                        
 
         
 
 V154         PERS HX   2010   DEPT FOR              
 
              PSYCHOLOGIC               [facility]             
 
    AL TRAUMA                                   
 
  PRS HAZARDS             
 
   HEALTH       
 
                
 
        
 
 12487        REGULAR   2010   F[facility]              
 
              ASTIGMATISM               VISION                  
 
                                                 
 
                 
 
 02837        UNSPECIFIED  2010   [facility]              
 
                              SERVICES              
 
    ARTHROPATHY          PSC                     
 
   OTHER                 
 
  SPECIFIED    
 
  SITES         
 
                
 
      
 
 44899        DISPLCMT   2010   [facility]              
 
              LUMBAR                SERVICES              
 
    INTERVERT           PSC                     
 
  DISC W/O                 
 
  MYELOPATHY    
 
                
 
           
 
 7242         LUMBAGO      2010   [facility]              
 
                                        SERVICES              
 
                 PSC                     
 
                 
 
 96033        SPINAL STEN  2010   [facility]              
 
               LUMB REG                [facility]              
 
    W/O           INC                     
 
  NEUROGENIC                 
 
  CLAUDICATIO   
 
  N             
 
             
 
 V571         OTHER   2010   [place]              
 
              PHYSICAL                [facility]              
 
    THERAPY              INC                     
 
                             
 
        
 
 63259        CONTUSION   2010   [place]              
 
              OF BACK                   [facility]              
 
                         INC                     
 
                     
 
 8472         LUMBAR   2010   [place]              
 
              SPRAIN AND                EMERGENCY              
 
    STRAIN               SERVICES              
 
                ASSOCIATES    
 
                   
 
           
 
         PLACE OF   2010   [place]              
 
              OCCURRENCE,               MEDICAL              
 
     HOME                IMAGING              
 
                ASSOCIATES    
 
                  
 
           
 
         FALL FROM   2010   [place]              
 
              OTHER                MEDICAL              
 
    SLIPPING           IMAGING              
 
  TRIPPING OR   ASSOCIATES    
 
   STUMBLING                
 
                       
 
           
 
 4660         ACUTE   10-   [facility],              
 
              BRONCHITIS                VINICIO W               
 
                                                 
 
                   
 
 5276         MUCOCELE OF  10-   [facility]              
 
               SALIVARY                PATHOLOGY              
 
    GLAND                LABORATORY              
 
                              
 
             
 
 [id number]        DENTAL   2009   MULLEN,              
 
              CARIES                DANIEL W                
 
    EXTENDING                                   
 
  INTO PULP            
 
                
 
          
 
 5220         PULPITIS     2009   DANIEL MULLEN                
 
                                          
 
           
 
 38064        TOOTH   2009   [facility],              
 
              BROKEN FX                DANIEL COOPER                
 
    DUE TO                                   
 
  TRAUMA W/O           
 
  MENTION    
 
  COMP          
 
                
 
                                                                                
                                                                                
                                                                                
                                                                                
                                                                                
             
 
Medications
                      
 
 
 Na  ND  Rx  Da  Fi  Fi  Am  Da  Di  Ph  RX  Ph  St
 
 me  C   No  te  ll  ll  ou  ys  ag  ar   #  ys  at
 
         rm        s   nt      no  ma      ic  us
 
             Or  Da              si  cy      ia    
 
             de  te              s           n     
 
             re                                    
 
             d                                     
 
                                                   
 
                                                   
 
                                                   
 
                                                  
 
                                   
 
                           
 
                      
 
               
 
              
 
 OK  68      09  09      30  8           00  [place]  Ac
 
 OM  38      -0  -2      .0              00  L-  ti
 
 ET  20      4-  9-      00              07  MA  ve
 
 HA  04      20  20                      50  RT    
 
 ZI  10      17  17                      75       
 
 NE  1                                   29  PH    
 
                                            AR    
 
 25                                          MA    
 
                                            CY    
 
 MG                                             
 
                                      #5    
 
 TA                                    91 
 
 BL                                    
 
 ET                                    
 
                                
 
                           
 
                           
 
                           
 
                  
 
                  
 
                  
 
               
 
               
 
 HY  00      09  09      10  3           00  [place]  Ac
 
 DR  40      -0  -2      .0              00  L-  ti
 
 OC  60      4-  9-      00              02  MA  ve
 
 OD  12      20  20                      24  RT    
 
 ON  50      17  17                      17       
 
 -A  1                                   72  PH    
 
 CE                                          AR    
 
 TA                                          MA    
 
 MI                                          CY    
 
 NO                                             
 
 PH                                    #5    
 
 N                                     91 
 
 10                                    
 
 -3                                    
 
 25                             
 
                           
 
                           
 
                           
 
                  
 
                  
 
                  
 
               
 
               
 
               
 
 JAIME  53      09  09      20  10          00  [place]  Ac
 
 LF  74      -0  -2      .0              00  L-  ti
 
 AM  60      4-  9-      00              07  MA  ve
 
 ET  27      20  20                      50  RT    
 
 HO  20      17  17                      75       
 
 XA  5                                   30  PH    
 
 ZO                                          AR    
 
 LE                                          MA    
 
 -T                                          CY    
 
 MP                                              
 
                                      #5    
 
 DS                                    91 
 
                                      
 
 TA                                    
 
 BL                             
 
 ET                        
 
                           
 
                           
 
                  
 
                  
 
                  
 
               
 
               
 
               
 
               
 
 TR  57      09  09      6.  3           00  HO  Ac
 
 AM  66      -0  -2      00              00  ME  ti
 
 AD  40      6-  9-      0               04  TO  ve
 
 OL  37      20  20                      02  WN    
 
    71      17  17                      44       
 
 HC  8                                   70  PH    
 
 L                                           AR    
 
 50                                          MA    
 
                                            CY    
 
 MG                                             
 
                                      OF    
 
 TA                                     
 
 BL                                 CY 
 
 ET                                 NT 
 
                             HI 
 
                        AN 
 
                        A  
 
                           
 
                  
 
                  
 
                  
 
                  
 
                  
 
               
 
               
 
              
 
 CL  00      08  09      40  10          00  HO  Ac
 
 IN  59      -2  -2      .0              00  ME  ti
 
 DA  12      9-  2-      00              06  TO  ve
 
 MY  93      20  20                      09  WN    
 
 CI  20      17  17                      32       
 
 N   1                                   77  PH    
 
 HC                                          AR    
 
 L                                           MA    
 
 30                                          CY    
 
 0                                               
 
 MG                                    OF    
 
                                        
 
 CA                                 CY 
 
 PS                                 NT 
 
 UL                          HI 
 
 E                      AN 
 
                        A  
 
                           
 
                  
 
                  
 
                  
 
                  
 
                  
 
                  
 
                 
 
              
 
 OX  53      08  09      10  2           00  HO  Ac
 
 YC  74      -2  -2      .0              00  ME  ti
 
 OD  60      9-  2-      00              02  TO  ve
 
 ON  20      20  20                      01  WN    
 
 E-  30      17  17                      44       
 
 AC  5                                   93  PH    
 
 ET                                          AR    
 
 AM                                          MA    
 
 IN                                          CY    
 
 OP                                             
 
 HE                                    OF    
 
 N                                       
 
 5-                                 CY 
 
 32                                 NT 
 
 5                           HI 
 
                        AN 
 
                        A  
 
                           
 
                  
 
                  
 
                  
 
                  
 
                  
 
                 
 
               
 
              
 
 AL  00      03  05  5   90  30      EA  16  AR  Ac
 
 OK  78      -1  -1      .0          ST  82  NO  ti
 
 AZ  11      7-  7-      00          SI  20  LD  ve
 
 OL  08      20  20                  DE           
 
 AM  90      10  10                         RI    
 
  2  5                               PH      CH    
 
                                    AR      AR    
 
 MG                                  MA      D     
 
                                    CY      W     
 
 TA                                             
 
 BL                         OF            
 
 ET                                   
 
                         CY      
 
                         NT      
 
                  HI      
 
                AN      
 
                A       
 
                       
 
                  
 
                  
 
                  
 
               
 
               
 
               
 
               
 
              
 
     00      03  05  1   15  25      EA  16  GI  Ac
 
     59      -2  -1      0.          ST  98  LB  ti
 
     10      9-  7-      00          SI  21  ER  ve
 
     38      20  20      0           DE      T     
 
     50      10  10                         [place]    
 
     1                               PH      HN    
 
                                     AR       W    
 
                                     MA            
 
                                     CY            
 
                                                
 
                            OF            
 
                                      
 
                           CY      
 
                         NT      
 
                  HI      
 
                AN      
 
                A    
 
                     
 
               
 
               
 
               
 
               
 
               
 
               
 
               
 
              
 
 AL  00      03  04  5   90  30      EA  16  AR  Ac
 
 OK  78      -1  -1      .0          ST  82  NO  ti
 
 AZ  11      7-  7-      00          SI  20  LD  ve
 
 OL  08      20  20                  DE           
 
 AM  90      10  10                         RI    
 
  2  5                               PH      CH    
 
                                    AR      AR    
 
 MG                                  MA      D     
 
                                    CY      W     
 
 TA                                             
 
 BL                         OF            
 
 ET                                   
 
                         CY      
 
                         NT      
 
                  HI      
 
                AN      
 
                A       
 
                       
 
                  
 
                  
 
                  
 
               
 
               
 
               
 
               
 
              
 
     59      10  04  11  8.  20      EA  14  AR  Ac
 
     31      -2  -1      50          ST  82  NO  ti
 
     00      3-  6-      0           SI  09  LD  ve
 
     57      20  20                  DE           
 
     92      09  10                         RI    
 
     0                               PH      CH    
 
                                     AR      AR    
 
                                     MA      D     
 
                                     CY      W     
 
                                                 
 
                            OF            
 
                                     
 
                         CY      
 
                         NT      
 
                  HI      
 
                AN      
 
                A       
 
                       
 
               
 
               
 
               
 
               
 
               
 
               
 
               
 
              
 
 TI  00      03  03  1   90  30      EA  16  GI  Ac
 
 ZA  18      -3  -3      .0          ST  99  LB  ti
 
 NI  54      0-  0-      00          SI  57  ER  ve
 
 DI  40      20  20                  DE      T     
 
 NE  02      10  10                         [place]    
 
    3                               PH      HN    
 
 HC                                  AR       W    
 
 L                                   MA            
 
 4                                   CY            
 
 MG                                             
 
                           OF            
 
 TA                                   
 
 BL                      CY      
 
 ET                      NT      
 
                  HI      
 
                AN      
 
                A    
 
                     
 
                  
 
                  
 
                  
 
                  
 
                  
 
               
 
               
 
              
 
     00      03  03  1   15  25      EA  16  GI  Ac
 
     59      -2  -2      0.          ST  98  LB  ti
 
     10      9-  9-      00          SI  21  ER  ve
 
     38      20  20      0           DE      T     
 
     50      10  10                         [place]    
 
     1                               PH      HN    
 
                                     AR       W    
 
                                     MA            
 
                                     CY            
 
                                                
 
                            OF            
 
                                      
 
                           CY      
 
                         NT      
 
                  HI      
 
                AN      
 
                A    
 
                     
 
               
 
               
 
               
 
               
 
               
 
               
 
               
 
              
 
 AL  00      03  03  5   90  30      EA  16  AR  Ac
 
 OK  78      -1  -1      .0          ST  82  NO  ti
 
 AZ  11      7-  7-      00          SI  20  LD  ve
 
 OL  08      20  20                  DE           
 
 AM  90      10  10                         RI    
 
  2  5                               PH      CH    
 
                                    AR      AR    
 
 MG                                  MA      D     
 
                                    CY      W     
 
 TA                                             
 
 BL                         OF            
 
 ET                                   
 
                         CY      
 
                         NT      
 
                  HI      
 
                AN      
 
                A       
 
                       
 
                  
 
                  
 
                  
 
               
 
               
 
               
 
               
 
              
 
 TI  00      03  03  0   60  30      EA  16  MI  Ac
 
 ZA  18      -0  -0      .0          ST  57  CK  ti
 
 NI  54      1-  1-      00          SI  54     ve
 
 DI  40      20  20                  DE      GR    
 
 NE  02      10  10                         EG    
 
    3                               PH      OR    
 
 HC                                  AR      Y     
 
 L                                   MA      E     
 
 4                                   CY            
 
 MG                                             
 
                           OF            
 
 TA                                   
 
 BL                      CY      
 
 ET                      NT      
 
                  HI      
 
                AN      
 
                A      
 
                     
 
                  
 
                  
 
                  
 
                  
 
                  
 
               
 
               
 
              
 
 LI  63      03  03  5   60  30      EA  16  MI  Ac
 
 DO  48      -0  -0      .0          ST  57  CK  ti
 
 DE  10      1-  1-      00          SI  56     ve
 
 RM  68      20  20                  DE      GR    
 
    70      10  10                         EG    
 
 5%  6                               PH      OR    
 
                                    AR      Y     
 
 PA                                  MA      E     
 
 TC                                  CY            
 
 H                                              
 
                            OF            
 
                                      
 
                         CY      
 
                         NT      
 
                  HI      
 
                AN      
 
                A      
 
                     
 
                 
 
               
 
               
 
               
 
               
 
               
 
               
 
              
 
 AL  67      02  02  00  90  30      EA  16  AR  Ac
 
 OK  25      -1  -2      .0          ST  43  NO  ti
 
 AZ  30      9-  6-      00          SI  25  LD  ve
 
 OL  90      20  20                  DE           
 
 AM  11      10  10                         RI    
 
    1                               PH      CH    
 
 0.                                  AR      AR    
 
 5                                   MA      D     
 
 MG                                  CY      W     
 
                                                
 
 TA                         OF            
 
 BL                         CY         
 
 ET                      NT      
 
                         HI      
 
                  AN      
 
                A       
 
                        
 
                       
 
                  
 
                  
 
                  
 
                  
 
               
 
               
 
              
 
     59      10  12  01  8.  15      EA  14  AR  Ac
 
     31      -2  -0      50          ST  82  NO  ti
 
     00      3-  3-      0           SI  09  LD  ve
 
     57      20  20                  DE           
 
     92      09  09                         RI    
 
     0                               PH      CH    
 
                                     AR      AR    
 
                                     MA      D     
 
                                     CY      W     
 
                                                 
 
                            OF            
 
                           CY         
 
                         NT      
 
                         HI      
 
                  AN      
 
                A       
 
                        
 
                       
 
               
 
               
 
               
 
               
 
               
 
               
 
              
 
     59      10  11  00  8.  15      EA  14  AR  Ac
 
     31      -2  -0      50          ST  82  NO  ti
 
     00      3-  5-      0           SI  09  LD  ve
 
     57      20  20                  DE           
 
     92      09  09                         RI    
 
     0                               PH      CH    
 
                                     AR      AR    
 
                                     MA      D     
 
                                     CY      W     
 
                                                 
 
                            OF            
 
                           CY         
 
                         NT      
 
                         HI      
 
                  AN      
 
                A       
 
                        
 
                       
 
               
 
               
 
               
 
               
 
               
 
               
 
              
 
 AZ  00      10  10  00  6.  6       EA  14  AR  Ac
 
 IT  09      -0  -2      00          ST  56  NO  ti
 
 HR  37      5-  2-      0           SI  82  LD  ve
 
 OM  14      20  20                  DE           
 
 YC  61      09  09                         RI    
 
 IN  8                               PH      CH    
 
                                    AR      AR    
 
 25                                  MA      D     
 
 0                                   CY      W     
 
 MG                                             
 
                           OF            
 
 TA                        CY         
 
 BL                      NT      
 
 ET                      HI      
 
                  AN      
 
                A       
 
                        
 
                       
 
                  
 
                  
 
                  
 
                  
 
                  
 
               
 
              
 
     00      10  10  00  60  20      EA  14  AR  Ac
 
     25      -0  -2      .0          ST  56  NO  ti
 
     83      5-  2-      00          SI  81  LD  ve
 
     67      20  20                  DE           
 
     80      09  09                         RI    
 
     1                               PH      CH    
 
                                     AR      AR    
 
                                     MA      D     
 
                                     CY      W     
 
                                                 
 
                            OF            
 
                            CY         
 
                         NT      
 
                         HI      
 
                  AN      
 
                A       
 
                        
 
                       
 
               
 
               
 
               
 
               
 
               
 
               
 
              
 
 IN  00      07  10  02  60  20      EA  13  MO  Ac
 
 DO  78      -1  -2      .0          ST  52  SE  ti
 
 ME  12      7-  2-      00          SI  24  S   ve
 
 TH  32      20  20                  DE      ST    
 
 AC  51      09  09                         EP    
 
 IN  0                               PH      HE    
 
                                    AR      N     
 
 25                                  MA      A     
 
                                    CY            
 
 MG                                              
 
                           OF            
 
 CA                         CY         
 
 PS                      NT      
 
 UL                      HI      
 
 E                AN      
 
                A       
 
                       
 
                     
 
                  
 
                  
 
                  
 
                  
 
                  
 
                 
 
              
 
 AM  00      09  10  00  15  5       EA  14  HE  Ac
 
 OX  78      -2  -0      .0          ST  43  ND  ti
 
 IC  12      5-  8-      00          SI  14  ER  ve
 
 IL  61      20  20                  DE      SO    
 
 LI  30      09  09                         N     
 
 N   5                               PH      RO    
 
 50                                  AR      BE    
 
 0                                   MA      RT    
 
 MG                                  CY       W    
 
                                               
 
 CA                         OF            
 
 PS                         CY         
 
 UL                      NT      
 
 E                       HI      
 
                  AN      
 
                A       
 
                        
 
                        
 
                  
 
                  
 
                  
 
                  
 
                 
 
               
 
              
 
     00      09  10  00  20  3       EA  14  HE  Ac
 
     05      -2  -0      .0          ST  43  ND  ti
 
     44      5-  8-      00          SI  13  ER  ve
 
     65      20  20                  DE      SO    
 
     02      09  09                         N     
 
     9                               PH      RO    
 
                                     AR      BE    
 
                                     MA      RT    
 
                                     CY       W    
 
                                                
 
                            OF            
 
                            CY         
 
                         NT      
 
                         HI      
 
                  AN      
 
                A       
 
                        
 
                        
 
               
 
               
 
               
 
               
 
               
 
               
 
              
 
 IN  00      07  09  01  60  20      EA  13  MO  Ac
 
 DO  78      -1  -1      .0          ST  52  SE  ti
 
 ME  12      7-  0-      00          SI  24  S   ve
 
 TH  32      20  20                  DE      ST    
 
 AC  51      09  09                         EP    
 
 IN  0                               PH      HE    
 
                                    AR      N     
 
 25                                  MA      A     
 
                                    CY            
 
 MG                                              
 
                           OF            
 
 CA                         CY         
 
 PS                      NT      
 
 UL                      HI      
 
 E                AN      
 
                A       
 
                       
 
                     
 
                  
 
                  
 
                  
 
                  
 
                  
 
                 
 
              
 
 IN  00      07  07  00  60  20      EA  13  MO  Ac
 
 DO  78      -1  -3      .0          ST  52  SE  ti
 
 ME  12      7-  0-      00          SI  24  S   ve
 
 TH  32      20  20                  DE      ST    
 
 AC  51      09  09                         EP    
 
 IN  0                               PH      HE    
 
                                    AR      N     
 
 25                                  MA      A     
 
                                    CY            
 
 MG                                              
 
                           OF            
 
 CA                         CY         
 
 PS                      NT      
 
 UL                      HI      
 
 E                AN      
 
                A       
 
                       
 
                     
 
                  
 
                  
 
                  
 
                  
 
                  
 
                 
 
              
 
 IN  00      06  07  01  30  10      EA  13  MO  Ac
 
 DO  78      -1  -1      .0          ST  15  SE  ti
 
 ME  12      6-  6-      00          SI  69  S   ve
 
 TH  32      20  20                  DE      ST    
 
 AC  51      09  09                         EP    
 
 IN  0                               PH      HE    
 
                                    AR      N     
 
 25                                  MA      A     
 
                                    CY            
 
 MG                                              
 
                           OF            
 
 CA                         CY         
 
 PS                      NT      
 
 UL                      HI      
 
 E                AN      
 
                A       
 
                       
 
                     
 
                  
 
                  
 
                  
 
                  
 
                  
 
                 
 
              
 
 IN  00      06  07  00  30  10      EA  13  MO  Ac
 
 DO  78      -1  -0      .0          ST  15  SE  ti
 
 ME  12      6-  2-      00          SI  69  S   ve
 
 TH  32      20  20                  DE      ST    
 
 AC  51      09  09                         EP    
 
 IN  0                               PH      HE    
 
                                    AR      N     
 
 25                                  MA      A     
 
                                    CY            
 
 MG                                              
 
                           OF            
 
 CA                         CY         
 
 PS                      NT      
 
 UL                      HI      
 
 E                AN      
 
                A       
 
                       
 
                     
 
                  
 
                  
 
                  
 
                  
 
                  
 
                 
 
              
 
     00      06  07  00  60  20      EA  13  MO  Ac
 
     55      -1  -0      .0          ST  15  SE  ti
 
     50      6-  2-      00          SI  70  S   ve
 
     16      20  20                  DE      ST    
 
     30      09  09                         EP    
 
     2                               PH      HE    
 
                                     AR      N     
 
                                     MA      A     
 
                                     CY            
 
                                                 
 
                            OF            
 
                            CY         
 
                         NT      
 
                         HI      
 
                  AN      
 
                A       
 
                       
 
                     
 
               
 
               
 
               
 
               
 
               
 
               
 
              
 
     00      06  06  00  15  3       EA  13  HE  Ac
 
     59      -0  -1      .0          ST  00  ND  ti
 
     10      4-  8-      00          SI  77  ER  ve
 
     38      20  20                  DE      SO    
 
     50      09  09                         N     
 
     1                               PH      RO    
 
                                     AR      BE    
 
                                     MA      RT    
 
                                     CY       W    
 
                                                
 
                            OF            
 
                            CY         
 
                         NT      
 
                         HI      
 
                  AN      
 
                A       
 
                        
 
                        
 
               
 
               
 
               
 
               
 
               
 
               
 
              
 
     00      05  05  00  20  3       EA  12  HE  Ac
 
     59      -0  -2      .0          ST  65  ND  ti
 
     10      7-  1-      00          SI  12  ER  ve
 
     38      20  20                  DE      SO    
 
     50      09  09                         N     
 
     1                               PH      RO    
 
                                     AR      BE    
 
                                     MA      RT    
 
                                     CY       W    
 
                                                
 
                            OF            
 
                            CY         
 
                         NT      
 
                         HI      
 
                  AN      
 
                A       
 
                        
 
                        
 
               
 
               
 
               
 
               
 
               
 
               
 
              
 
 OK  00      05  05  00  12  2       EA  12  HE  Ac
 
 OM  78      -0  -2      .0          ST  65  ND  ti
 
 ET  11      7-  1-      00          SI  13  ER  ve
 
 HA  83      20  20                  DE      SO    
 
 ZI  01      09  09                         N     
 
 NE  0                               PH      RO    
 
                                    AR      BE    
 
 25                                  MA      RT    
 
                                    CY       W    
 
 MG                                             
 
                           OF            
 
 TA                         CY         
 
 BL                      NT      
 
 ET                      HI      
 
                  AN      
 
                A       
 
                        
 
                        
 
                  
 
                  
 
                  
 
                  
 
                  
 
               
 
              
 
     00      05  05  00  15  3       EA  12  HE  Ac
 
     59      -0  -2      .0          ST  67  ND  ti
 
     10      8-  1-      00          SI  12  ER  ve
 
     54      20  20                  DE      SO    
 
     00      09  09                         N     
 
     1                               PH      RO    
 
                                     AR      BE    
 
                                     MA      RT    
 
                                     CY       W    
 
                                                
 
                            OF            
 
                            CY         
 
                         NT      
 
                         HI      
 
                  AN      
 
                A       
 
                        
 
                        
 
               
 
               
 
               
 
               
 
               
 
               
 
              
 
 AM  00      05  05  00  30  10      EA  12  ST  Ac
 
 OX  78      -0  -2      .0          ST  60  EP  ti
 
 IC  12      4-  1      00          SI  38  HE  ve
 
 IL  61      20  20                  DE      NS    
 
 LI  30      09  09                              
 
 N   5                               PH      KE    
 
 50                                  AR      VI    
 
 0                                   MA      N     
 
 MG                                  CY      C     
 
                                                
 
 CA                         OF            
 
 PS                         CY         
 
 UL                      NT      
 
 E                       HI      
 
                  AN      
 
                A       
 
                        
 
                       
 
                  
 
                  
 
                  
 
                  
 
                 
 
               
 
              
 
     00      04  04  00  20  5       EA  12  ST  Ac
 
     17      -1  -2      .0          ST  31  EP  ti
 
     26      3-  3-      00          SI  07  HE  ve
 
     35      20  20                  DE      NS    
 
     97      09  09                              
 
     0                               PH      KE    
 
                                     AR      VI    
 
                                     MA      N     
 
                                     CY      C     
 
                                                
 
                            OF            
 
                            CY         
 
                         NT      
 
                         HI      
 
                  AN      
 
                A       
 
                        
 
                       
 
               
 
               
 
               
 
               
 
               
 
               
 
              
 
 AM  00      04  04  00  30  10      EA  12  ST  Ac
 
 OX  78      -0  -2      .0          ST  26  EP  ti
 
 IC  12      9-  3-      00          SI  87  HE  ve
 
 IL  61      20  20                  DE      NS    
 
 LI  30      09  09                              
 
 N   5                               PH      KE    
 
 50                                  AR      VI    
 
 0                                   MA      N     
 
 MG                                  CY      C     
 
                                                
 
 CA                         OF            
 
 PS                         CY         
 
 UL                      NT      
 
 E                       HI      
 
                  AN      
 
                A       
 
                        
 
                       
 
                  
 
                  
 
                  
 
                  
 
                 
 
               
 
              
 
     00      04  04  00  20  5       EA  12  ST  Ac
 
     60      -0  -2      .0          ST  26  EP  ti
 
     35      9-  3-      00          SI  86  HE  ve
 
     46      20  20                  DE      NS    
 
     82      09  09                              
 
     8                               PH      KE    
 
                                     AR      VI    
 
                                     MA      N     
 
                                     CY      C     
 
                                                
 
                            OF            
 
                            CY         
 
                         NT      
 
                         HI      
 
                  AN      
 
                A       
 
                        
 
                       
 
               
 
               
 
               
 
               
 
               
 
               
 
              
 
 CY  59      04  04  00  21  7       CL  19  WR  Ac
 
 CL  74      -1  -2      .0          IN  16  IG  ti
 
 OB  60      3-  3-      00          IC  91  HT  ve
 
 EN  17      20  20                              
 
 ZA  70      09  09                  PH      AR    
 
 OK  6                               AR      DY    
 
 IN                                  MA       C    
 
 E                                   CY            
 
 10                                                
 
                                                
 
 MG                                       
 
                                      
 
 TA                              
 
 BL                              
 
 ET                       
 
                        
 
                     
 
                  
 
               
 
               
 
               
 
               
 
               
 
               
 
     00      04  04  00  21  7       CL  19  WR  Ac
 
     55      -1  -2      .0          IN  16  IG  ti
 
     51      3-  3-      00          IC  92  HT  ve
 
     88      20  20                              
 
     30      09  09                  PH      AR    
 
     2                               AR      DY    
 
                                     MA       C    
 
                                     CY            
 
                                                   
 
                                                 
 
                                          
 
                                       
 
                                 
 
                                 
 
                          
 
                        
 
                     
 
                  
 
     68      04  04  00  20  10      CL  19  WR  Ac
 
     77      -1  -2      .0          IN  16  IG  ti
 
     40      3-  3-      00          IC  90  HT  ve
 
     12      20  20                              
 
     26      09  09                  PH      AR    
 
     0                               AR      DY    
 
                                     MA       C    
 
                                     CY            
 
                                                   
 
                                                  
 
                                          
 
                                       
 
                                 
 
                                 
 
                          
 
                        
 
                     
 
                  
 
                                                                                
                                                                                
                                                                                
                                                                                
                                                                                
                                                                                
  
 
Procedures
                      
 
 
 Procedure  DOS        Code       Location   Performer  Comment  
 
                                                                 
 
                                                 
 
 INCISION     46311      [facility]   [facility]ENUSCH             
 
 &   7                     PHYSICIAN                     
 
 DRAINAGE                     S, PLLC            
 
 ABSCESS                  
 
 COMPLICAT             
 
 ED/MULTIP     
 
 LE            
 
               
 
        
 
 IV     27314      [facility]            
 
 INFUSION   5                     [facility]           
 
 THERAPY/P                    INC        INC       
 
 ROPHYLAXI                           
 
 S /DX 1ST             
 
  TO 1 HR      
 
               
 
              
 
 HEPATITIS    16660      [facility]ACN[facility]            
 
  C   5                     [facility]           
 
 ANTIBODY                     INC        INC       
 
                                     
 
                      
 
 CULTURE     14408      [facility]            
 
 BACTERIAL  5                     [facility]           
 
  BLOOD                     INC        INC       
 
 AEROBIC                            
 
 W/ID              
 
 ISOLATES      
 
               
 
              
 
 BLOOD     01140      [facility]            
 
 COUNT   5                     [facility]           
 
 COMPLETE                     INC        INC       
 
 AUTO&AUTO                           
 
  DIFRNTL              
 
 WBC           
 
               
 
         
 
 COMPREHEN    94896      [facility]            
 
 SIVE   5                     [facility]           
 
 METABOLIC                    INC        INC       
 
  PANEL                              
 
                       
 
            
 
 HEPATITIS    92279      [facility]            
 
  A   5                     [facility]           
 
 ANTIBODY                     INC        INC       
 
 HAAB                                
 
                       
 
          
 
 [place]     [address]      [facility]            
 
 LACTATE    5                     [facility]           
 
                              INC        INC       
 
                                   
 
             
 
 HEPATITIS    11977      [facility]            
 
  B CORE   5                     [facility]           
 
 ANTIBODY                     INC        INC       
 
 HBCAB                            
 
 TOTAL                 
 
               
 
           
 
 HEPATITIS    33754      [facility]            
 
  B SURF   5                     [facility]           
 
 ANTIBODY                     INC        INC       
 
 HBSAB                               
 
                       
 
           
 
 [place]     [address]      [facility]            
 
 HEPATITIS  5                     [facility]           
 
  B                     INC        INC       
 
 SURFACE                            
 
 ANTIGEN               
 
               
 
             
 
 COLLECTIO  10-  18138      [facility]            
 
 N VENOUS   5                     [facility]           
 
 BLOOD                     INC        INC       
 
 VENIPUNCT                           
 
 URE                   
 
               
 
         
 
 COMPREHEN  10-  13371      [facility]            
 
 SIVE   5                     [facility]           
 
 METABOLIC                    INC        INC       
 
  PANEL                              
 
                       
 
            
 
 [place]   10-  70255      [facility]            
 
 THYROID   5                     [facility]           
 
 STIMULATI                    INC        INC       
 
 NG                            
 
 HORMONE              
 
 TSH           
 
               
 
         
 
 [place]   10-  26375      [facility]            
 
 THYROXINE  5                     [facility]           
 
  TOTAL                       INC        INC       
 
                                     
 
                    
 
 HEMOGLOBI  10-  90497      [facility]            
 
 N   5                     [facility]           
 
 GLYCOSYLA                    INC        INC       
 
 EVELINE A1C                             
 
                       
 
             
 
 BLOOD   10-  46832      [facility]            
 
 COUNT   5                     [facility]           
 
 COMPLETE                     INC        INC       
 
 AUTO&AUTO                           
 
  DIFRNTL              
 
 WBC           
 
               
 
         
 
 HEPATITIS    95775      [facility]            
 
  C   5                     [facility]           
 
 ANTIBODY                     INC        INC       
 
                                     
 
                      
 
 BLOOD     93829      [facility]            
 
 COUNT   5                     [facility]           
 
 COMPLETE                     INC        INC       
 
 AUTO&AUTO                           
 
  DIFRNTL              
 
 WBC           
 
               
 
         
 
 THROMBOPL    40849      [facility]            
 
 ASTIN   5                     [facility]           
 
 TIME                     INC        INC       
 
 PARTIAL                            
 
 PLASMA/WH             
 
 OLE BLOOD     
 
               
 
               
 
 [place]     [address]      [facility]            
 
 THYROXINE  5                     [facility]           
 
  TOTAL                       INC        INC       
 
                                     
 
                    
 
 COMPREHEN    07189      [facility]            
 
 SIVE   5                     [facility]           
 
 METABOLIC                    INC        INC       
 
  PANEL                              
 
                       
 
            
 
 [place]     [address]      [facility]            
 
 AMMONIA    5                     [facility]           
 
                              INC        INC       
 
                                   
 
             
 
 [place]     [address]      [facility]            
 
 THYROID   5                     [facility]           
 
 STIMULATI                    INC        INC       
 
 NG                            
 
 HORMONE              
 
 TSH           
 
               
 
         
 
 INF AGT           [facility]            
 
 AB DETECT  5                     [facility]           
 
  EIA TECH                    INC        INC       
 
                             
 
 HIV-1&/HI             
 
 V-2 SCR       
 
               
 
             
 
 COLLECTIO    23082      [facility]            
 
 N VENOUS   5                     [facility]           
 
 BLOOD                     INC        INC       
 
 VENIPUNCT                           
 
 URE                   
 
               
 
         
 
 HEPATITIS    73761      [facility]            
 
  A   5                     [facility]           
 
 ANTIBODY                     INC        INC       
 
 HAAB                                
 
                       
 
          
 
 PROTHROMB    01188      [facility]            
 
 IN TIME    5                     [facility]           
 
                              INC        INC       
 
                                   
 
             
 
 HEPATITIS    51247      [facility]            
 
  B CORE   5                     [facility]           
 
 ANTIBODY                     INC        INC       
 
 HBCAB                            
 
 TOTAL                 
 
               
 
           
 
 [place]     [address]      [facility]            
 
 HEPATITIS  5                     [facility]           
 
  B                     INC        INC       
 
 SURFACE                            
 
 ANTIGEN               
 
               
 
             
 
 HEPATITIS    40502      [facility]            
 
  B SURF   5                     [facility]           
 
 ANTIBODY                     INC        INC       
 
 HBSAB                               
 
                       
 
           
 
 RADIOLOGI    44005      [place]   [facility]            
 
 C EXAM   5                     MEDICAL                      
 
 CHEST 2                     IMAGING            
 
 VIEWS       ASS        
 
 FRONTAL&L               
 
 ATERAL            
 
               
 
            
 
 RADEX     51334      [place]   [facility]            
 
 SPINE   5                     MEDICAL                      
 
 LUMBOSACR                    IMAGING            
 
 AL 2/3       ASS        
 
 VIEWS                   
 
                   
 
           
 
 CT     [address]      [place]   [facility]            
 
 HEAD/BRAI  5                     MEDICAL                      
 
 N W/O                     IMAGING            
 
 CONTRAST       ASS        
 
 MATERIAL                
 
                   
 
              
 
 INITIAL     88805      VÍCTOR RENEE SADI            
 
 INPATIENT  4                                                   
 
  CONSULT                                        
 
 NEW/ESTAB       
 
  PT 20      
 
 MIN           
 
               
 
         
 
 [place]     [address]      [facility]   RAJESH            
 
 ABDOMEN &  4                     PAMELA        PAMELA                
 
  PELVIS                                          
 
 W/O                
 
 CONTRAST      
 
 MATERIAL      
 
               
 
              
 
 OPHTH     35873      FISH ARNOLD,            
 
 MEDICAL   0                     VISION     GENA M           
 
 XM&EVAL                                          
 
 COMPRHNSV                       
 
  ESTAB PT     
 
  1/>          
 
               
 
          
 
 TENS           [facility]   [facility]            
 
 DEVICE   0                                                   
 
 4/MORE                                        
 
 LEADS        
 
 MULTI      
 
 NERVE      
 
 STIMULATI     
 
 ON            
 
               
 
        
 
 APPLICATI    97899      [facility]            
 
 ON   0                     [facility]           
 
 MODALITY                     INC        INC       
 
 1/> AREAS                           
 
  HOT/COLD             
 
  PACKS        
 
               
 
            
 
 APPL     05725      [facility]            
 
 MODALITY   0                     [facility]           
 
 1/> AREAS                    INC        INC       
 
  ELEC                            
 
 STIMJ              
 
 UNATTENDE     
 
 D             
 
               
 
       
 
 THERAPEUT    30036      [facility]            
 
 IC PX 1/>  0                     [facility]           
 
  AREAS                     INC        INC       
 
 EACH 15                            
 
 MIN              
 
 EXERCISES     
 
               
 
               
 
 APPL     83678      [facility]            
 
 MODALITY   0                     [facility]           
 
 1/> AREAS                    INC        INC       
 
  ELEC                            
 
 STIMJ EA              
 
 15 MIN        
 
               
 
            
 
 APPLICATI    61549      [facility]            
 
 ON   0                     [facility]           
 
 MODALITY                     INC        INC       
 
 1/> AREAS                           
 
  HOT/COLD             
 
  PACKS        
 
               
 
            
 
 THERAPEUT    57132      [facility]            
 
 IC PX 1/>  0                     [facility]           
 
  AREAS                     INC        INC       
 
 EACH 15                            
 
 MIN              
 
 EXERCISES     
 
               
 
               
 
 APPL     00003      [facility]            
 
 MODALITY   0                     [facility]           
 
 1/> AREAS                    INC        INC       
 
  ELEC                            
 
 STIMJ              
 
 UNATTENDE     
 
 D             
 
               
 
       
 
 APPL     29807      [facility]            
 
 MODALITY   0                     [facility]           
 
 1/> AREAS                    INC        INC       
 
  ELEC                            
 
 STIMJ              
 
 UNATTENDE     
 
 D             
 
               
 
       
 
 THERAPEUT    47784      [facility]            
 
 IC PX 1/>  0                     [facility]           
 
  AREAS                     INC        INC       
 
 EACH 15                            
 
 MIN              
 
 EXERCISES     
 
               
 
               
 
 PHYSICAL     56516      [facility]            
 
 THERAPY   0                     [facility]           
 
 EVALUATIO                    INC        INC       
 
 N                                   
 
                       
 
       
 
 APPLICATI    24280      [facility]            
 
 ON   0                     [facility]           
 
 MODALITY                     INC        INC       
 
 1/> AREAS                           
 
  HOT/COLD             
 
  PACKS        
 
               
 
            
 
 PSYCHOLOG    78026      PHYSICIAN  VERNON THOMAS   0                     S   [facility]          
 
 TESTING                     SERVICES             
 
 ADMN BY       [facility]                  
 
 TECH OK                
 
 HR                
 
               
 
        
 
 RADEX     64533      [place]   NETO,            
 
 SPINE   0                     MEDICAL   SHELLY P           
 
 LUMBOSACR                    IMAGING             
 
 AL       ASSOCIATE           
 
 MINIMUM 4    S          
 
  VIEWS                  
 
                 
 
            
 
 LEVEL III  10-  57821      ORAL   ORAL            
 
  SURG   9                     PATHOLOGY  PATHOLOGY          
 
 PATHOLOGY                         
 
        LABORATOR  LABORATOR 
 
 GROSS&[facility]NER    Y          Y         
 
 ROSCOPIC                          
 
 EXAM              
 
               
 
          
 
 BIOPSY OF    21220      SEBAS MULLEN           
 
  LIP       9                     , DANIEL SANCHEZ         
 
                                
 
         
 
 THER     89888      SEBAS MULLEN           
 
 PROPH/DX   9                     DANIEL ROBERT           
 
 NJX IV                     W          W         
 
 PUSH                            
 
 SINGLE/1S         
 
 T      
 
 SBST/DRUG     
 
               
 
               
 
 ORTHOPANT    30222      SEBAS MULLEN           
 
 OGRAM      DANIEL Ivy ROBERT W W         
 
                                 
 
         
 
 DEEP           SEBAS MULLEN           
 
 SEDATION/  DANIEL Ivy ROBERT           
 
 GENERAL                     W          W         
 
 ANESTHESI                           
 
 A-1ST 30          
 
 MINUTES       
 
               
 
             
 
 MRI     88225      REYNA C  RAJESH,           
 
 SPINAL   9                      RAJESH   REYNA           
 
 CANAL                                          
 
 LUMBAR                           
 
 W/O      
 
 CONTRAST      
 
 MATERIAL      
 
               
 
              
 
 3D     46134      REYNA C  RAJESH,           
 
 RENDERING  9                      RAJESH   REYNA           
 
  W/INTERP                                         
 
  &                           
 
 POSTPROCE     
 
 SS      
 
 SUPERVISI     
 
 ON            
 
               
 
        
 
 RADEX     64110      [facility]            
 
 SPINE   9                     [facility]           
 
 LUMBOSACR                    INC        INC       
 
 AL                            
 
 MINIMUM 4             
 
  VIEWS        
 
               
 
            
 
                                                                                
                                                                                
                                                                                
                                                                                
                                                                                
                                                                                
                                                                                
                                                                                
                              
 
Encounters
                      
 
 
 Encounter  Start   End Date   Code       Location   Performer
 
  Type      Date                                                 
 
                                                        
 
                
 
 EMERGENCY    69240      [facility] RAMESH  
 
    7          7                     PHYSICIAN           
 
 DE[facility]MEN                               [facility]         
 
 T VISIT                    
 
 HIGH/URGE             
 
 NT      
 
 SEVERITY      
 
               
 
              
 
 HOSPITAL                [facility]            
 
 -   5          5                     [facility]            
 
 OUTPATIEN                                   INC                 
 
 T                                             
 
                   
 
       
 
 EMERGENCY    17515      [facility]            
 
    5          5                     [facility]                 
 
 T VISIT                            
 
 MODERATE          
 
 SEVERITY      
 
               
 
              
 
 EMERGENCY    46565      KAVITHA ROMERO 
 
    5          5                     PHYSICIAN  [facility]      
 
 DEPARTMEN                               [facility]             
 
 T VISIT                      
 
 HIGH/URGE             
 
 NT      
 
 SEVERITY      
 
               
 
              
 
 HOSPITAL   10-  10-             [facility]            
 
 -   5          5                     [facility]            
 
 OUTPATIEN                                   INC                 
 
 T                                             
 
                   
 
       
 
 OFFICE     35439      [facility]   HEATHER 
 
 OUTPATITRENTON  5          5                     PHYSICIAN  [facility]      
 
 T VISIT                                S GROUP             
 
 15                      
 
 MINUTES               
 
               
 
             
 
 HOSPITAL                [facility]            
 
 -   5          5                     [facility]            
 
 OUTPATIEN                                   INC                 
 
 T                                             
 
                   
 
       
 
 OFFICE     81780      JOYCE COOK OUTPATIEN  0          0                     VINICIO COOPER
 
 T VISIT                                                    
 
 15                            
 
 MINUTES       
 
               
 
             
 
 OFFICE     65745      JOYCE COOK OUTPATIEN  0          0                     [facility] COOPER
 
 T VISIT                                                    
 
 15                            
 
 MINUTES       
 
               
 
             
 
 OFFICE     81498      PHYSICIAN  VISHNU CRUZ  0          0                     [facility] COOPER   
 
 T VISIT                                SERVICES            
 
 25          PSC            
 
 MINUTES                 
 
                   
 
             
 
 OFFICE     20212      JOYCE COOK OUTPATIEN  0          0                     VINICIO COOPER
 
 T VISIT                                                    
 
 15                            
 
 MINUTES       
 
               
 
             
 
 HOSPITAL                [facility]            
 
 -   0          0                     [facility]            
 
 OUTPATIEN                                   INC                 
 
 T                                             
 
                   
 
       
 
 OFFICE     97686      VISHNU MACIEL  0          0                     S   [facility] [place] [facility]                                SERVICES            
 
 MINUTES           PSC               
 
                         
 
                 
 
 EMERGENCY    80432      [facility]            
 
    0          0                     [facility]            
 
 DEPARTMEN                               INC                 
 
 T VISIT                            
 
 LOW/MODER         
 
  SEVERITY     
 
               
 
               
 
 HOSPITAL   02-  02-             [facility]            
 
 -   0          0                     [facility]            
 
 OUTPATIEN                                   INC                 
 
 T                                             
 
                   
 
       
 
 EMERGENCY    85067      MERLE   HEATHER, 
 
    0          0                     EMERGENCY  [facility]           
 
 T VISIT                    
 
 HIGH/URGE     ASSOCIATE 
 
 NT      S         
 
 SEVERITY                
 
                 
 
              
 
 OFFICE   10-  10-  31090      JOYCE COOK, 
 
 OUTPATIEN  9          9                     VINICIO MOONEY ALLISON
 
 T NEW 30                                                    
 
 MINUTES                             
 
               
 
             
 
 OFFICE     58729      [place]   CONSUELO, 
 
 CONSULTAT  9          9                     MEDICAL   [place] A
 
 ADAN                                SERV            
 
 NEW/ESTAB         [facility]         
 
  PATIENT                
 
 40 MIN                  
 
               
 
            
 
 OFFICE     10428      [facility] REYESPATITRENTON  9          9                     [facility]       
 
 T VISIT                                 PSC                
 
 15                    
 
 MINUTES            
 
               
 
             
 
 OFFICE     27435      [facility] REYES  9          9                     [facility]       
 
 T VISIT                                 PSC                
 
 15                    
 
 MINUTES            
 
               
 
             
 
 HOSPITAL                [facility]            
 
 -   9          9                     [facility]            
 
 OUTPATIEN                                   INC                 
 
 T                                             
 
                   
 
       
 
 OFFICE     50167      [facility]A REYESPATITRENTON  9          9                     [facility] VISIT                                 PSC                
 
 15                    
 
 MINUTES            
 
               
 
             
 
 [facility]                [facility]            
 
 -   9          9                     [facility]            
 
 OUTPATIEN                                   INC                 
 
 T                                             
 
                   
 
       
 
 OFFICE     29340      [facility]A REYES
 
 OUTPATITRENTON  9          9                     [facility] VISIT                                 PSC                
 
 15                    
 
 MINUTES

## 2017-10-07 NOTE — EXTERNAL MEDICAL SUMMARY RPT
Patient Summary
 Created on: 10/05/2017
 
BERYL MANN
External Reference #: [id number]
: 68
Sex: Female
 
Demographics
 
 
 
 Address  [address], [place] [address]
 
 Home Phone  [phone]
 
 Preferred Language  Unknown
 
 Marital Status  Unknown
 
 Faith Affiliation  Unknown
 
 Race  White
 
 Ethnic Group  Unknown
 
 
Author
 
 
 
 Author            ,            SAMIR DAWSON
 
 Address  Unknown
 
 Phone  [email or web address]
 
 
 
Support
 
 
 
 Name  Relationship  Address  Phone
 
 MACKENZIE,            Next Of Kin  [address]   +1 
 
   DANIEL CLAIREECDEENA,   [phone]
 
   [place]  [address] 
 
 
 
Care Team Providers
 
 
 
 Care Team Member Name  Role  Phone
 
 VINICIO COOK,   Unavailable  Unavailable
 
 VINICIO COOK   
 
 ELLIS ALL, ELLIS ALL   Unavailable  Unavailable
 
 [facility]   Un[facility]  Unavailable
 
 [facility]   
 
 RAJESH PAMELA,   Unavailable  Unavailable
 
 RAJESH PAMELA   
 
 RAJESH PAMELA,   Unavailable  Unavailable
 
 RAJESH PAMELA   
 
 RAJESH, REYNA,   Unavailable  Unavailable
 
 RAJESH, REYNA   
 
 [facility]  Unavailable  Unavailable
 
  [facility]  
 
    
 
 [facility]   Unavailable  Unavailable
 
 [place]   
 
 [facility] [place]  
 
    
 
 [facility]   Unavailable  Unavailable
 
 [place]  
 
  [facility] [place]  
 
    
 
 [facility]   Unavailable  Unavailable
 
 HEATHER ABNER, HEATHER   Unavailable  Unavailable
 
 ABNER   
 
 PITO ROMERO S,   Unavailable  Unavailable
 
 PITO ROMERO S   
 
 SHAHLA CRUZ,   Unavailable  Unavailable
 
 SHAHLA CRUZ   
 
 [facility]   Unavailable  Unavailable
 
 [facility]   
 
 [facility]   
 
 DANIEL MULLEN,   Unavailable  Unavailable
 
 DANIEL MULLEN   
 
 [facility],  Unavailable  Unavailable
 
  [facility]  
 
    
 
 [facility]   Unavailable  Unavailable
 
 [facility], [facility]   
 
 VÍCTOR GRAVES   Unavailable  Unavailable
 
 VÍCTOR GRAVES   Unavailable  Unavailable
 
 TASH THOMAS,   Unavailable  Unavailable
 
 TASH THOMAS EMMETT P,   Unavailable  Unavailable
 
 SHELLY DUQUE   
 
 [facility]   Unavailable  Unavailable
 
 [facility]   
 
 [facility]   
 
 [facility],   Unavailable  Unavailable
 
 [facility]   
 
 RENUSCH, RENUSCH   Unavailable  Unavailable
 
 GENA ARNOLD,   Unavailable  Unavailable
 
 GENA ARNOLD MD,   Unavailable  Unavailable
 
 ÁLVARO Yanez MD,   Unavailable  Unavailable
 
 ÁLVARO CORDERO A C, WRIGHT,   Unavailable  Unavailable
 
 [facility]SA C   
 
                                            
 
Purpose
                      Continuity of Care Document - 2009 through 10-05-
2017                                                                            
                     
 
Problems
                      
 
 
 Code         Diagnosis    DOS          Provider     Status     
 
                                                               
 
               
 
 [id number]       CUTANEOUS   2017   [facility]              
 
              ABSCESS OF                PHYSICIANS,             
 
      RIGHT UPPER           PLLC                   
 
   LIMB                       
 
                  
 
      
 
 D649         ANEMIA   2015   [facility]              
 
              UNSPECIFIED               [facility]IANS,             
 
                            PLLC                   
 
                          
 
      
 
 I10          ESSENTIAL   2015   [facility]              
 
              PRIMARY                [facility]              
 
      HYPERTENSIO          INC                     
 
  N                          
 
             
 
 [id number]       CELLULITIS   2015   [facility]              
 
              OF RIGHT                [facility]ICIANS,             
 
      UPPER LIMB            PLLC                   
 
                              
 
             
 
 R945         ABNORMAL   2015   [facility]              
 
              RESULTS OF                [facility]              
 
      LIVER           INC                     
 
  FUNCTION                 
 
  STUDIES       
 
                
 
        
 
 Z720         TOBACCO USE  2015   [facility]              
 
                                        [facility]              
 
                       INC                     
 
                 
 
 [id number]       UNSPECIFIED  10-   [facility]              
 
               ASTHMA                [facility]              
 
      UNCOMPLICAT          INC                     
 
  ED                         
 
              
 
 50032        ASTHMA,   2015   [facility]              
 
              UNSPECIFIED               PHYSICIANS              
 
      ,           GROUP                   
 
  UNSPECIFIED                 
 
   STATUS         
 
                
 
        
 
 46081        OTHER   2015   [facility]              
 
              MALAISE AND               PHYSICIANS              
 
       FATIGUE             GROUP                   
 
                              
 
           
 
 7905         OTHER   2015   [facility]              
 
              NONSPECIFIC               PHYSICIANS              
 
       ABNORMAL           GROUP                   
 
  SERUM                  
 
  ENZYME      
 
  LEVELS        
 
                
 
       
 
 07327        DEGEN   2015   [place]              
 
              LUMBAR/LUMB               MEDICAL              
 
      OSACRAL           IMAGING ASS             
 
  INTERVERTEB                 
 
  RAL DISC              
 
                
 
         
 
 7245         UNSPECIFIED  2015   [place]              
 
               BACKACHE                 MEDICAL              
 
                           IMAGING ASS             
 
                        
 
            
 
 7804         DIZZINESS   2015   [place]              
 
              AND                MEDICAL              
 
      GIDDINESS            IMAGING ASS             
 
                              
 
                  
 
 09843        SHORTNESS   2015   [place]              
 
              OF BREATH                 MEDICAL              
 
                           IMAGING ASS             
 
                        
 
            
 
 2859         UNSPECIFIED  2014   [facility]                
 
               ANEMIA                                           
 
                                          
 
          
 
 6202         OTHER AND   2014   [facility]              
 
              UNSPECIFIED               [facility]                     
 
     OVARIAN                                  
 
  CYST            
 
                
 
 3384         CHRONIC   2010   JOYCELYN COOK                VINICIO W               
 
    SYNDROME                                     
 
                        
 
         
 
 V154         PERS HX   2010   [facility]              
 
              PSYCHOLOGIC               [facility]             
 
    AL TRAUMA                                   
 
  PRS HAZARDS             
 
   HEALTH       
 
                
 
        
 
 01712        REGULAR   2010   [facility]              
 
              ASTIGMATISM               VISION                  
 
                                                 
 
                 
 
 49058        UNSPECIFIED  2010   [facility]              
 
                              SERVICES              
 
    ARTHROPATHY          PSC                     
 
   OTHER                 
 
  SPECIFIED    
 
  SITES         
 
                
 
      
 
 49808        DISPLCMT   2010   [facility]              
 
              LUMBAR                SERVICES              
 
    INTERVERT           PSC                     
 
  DISC W/O                 
 
  MYELOPATHY    
 
                
 
           
 
 7242         LUMBAGO      2010   [facility]              
 
                                        SERVICES              
 
                 PSC                     
 
                 
 
 05152        SPINAL STEN  2010   [facility]              
 
               LUMB REG                [facility]              
 
    W/O           INC                     
 
  NEUROGENIC                 
 
  CLAUDICATIO   
 
  N             
 
             
 
 V571         OTHER   2010   [facility]              
 
              PHYSICAL                [facility]              
 
    THERAPY              INC                     
 
                             
 
        
 
 55826        CONTUSION   2010   [facility]              
 
              OF BACK                   [facility]              
 
                         INC                     
 
                     
 
 8472         LUMBAR   2010   [place]              
 
              SPRAIN AND                EMERGENCY              
 
    STRAIN               SERVICES              
 
                ASSOCIATES    
 
                   
 
           
 
         PLACE OF   2010   [place]              
 
              OCCURRENCE,               MEDICAL              
 
     HOME                IMAGING              
 
                ASSOCIATES    
 
                  
 
           
 
         FALL FROM   2010   [place]              
 
              OTHER                MEDICAL              
 
    SLIPPING           IMAGING              
 
  TRIPPING OR   ASSOCIATES    
 
   [facility]                
 
                       
 
           
 
 4660         ACUTE   10-   [facility],              
 
              BRONCHITIS                VINICIO W               
 
                                                 
 
                   
 
 5276         MUCOCELE OF  10-   [facility]              
 
               SALIVARY                PATHOLOGY              
 
    GLAND                LABORATORY              
 
                              
 
             
 
 48427        DENTAL   2009   [facility],              
 
              CARIES                DANIEL COOPER                
 
    EXTENDING                                   
 
  INTO PULP            
 
                
 
          
 
 5220         PULPITIS     2009   DANI[facility]EN                
 
                                          
 
           
 
 97478        TOOTH   2009   [facility],              
 
              BROKEN FX                DANIEL COOPER                
 
    DUE TO                                   
 
  TRAUMA W/O           
 
  MENTION    
 
  COMP          
 
                
 
 [id number]    Cannabis                [place]              
 
              type drug                [facility]                
 
                                  
 
                
 
 [id number]     Narcotic                [place]              
 
              drug user                 [facility]                
 
                            
 
         
 
 780.2        Syncope                   [facility]                
 
                      
 
         
 
 D64.9        ANEMIA,                                        
 
              UNSPECIFIED                                       
 
                                                      
 
                      
 
 F17.200      NICOTINE                                        
 
              DEPENDENCE,                                       
 
                                              
 
  UNSPECIFIED             
 
  ,    
 
  UNCOMPLICAT   
 
  ED            
 
              
 
 F19.10       OTHER                                        
 
              PSYCHOACTIV                                       
 
     E SUBSTANCE                                       
 
   ABUSE,              
 
  UNCOMPLICAT   
 
  ED            
 
              
 
 F19.20       OTHER                                        
 
              PSYCHOACTIV                                       
 
     E SUBSTANCE                                       
 
                
 
  DEPENDENCE,   
 
      
 
  UNCOMPLICAT   
 
  ED            
 
              
 
 F19.90       OTHER                                        
 
              PSYCHOACTIV                                       
 
     E SUBSTANCE                                       
 
   USE,              
 
  UNSPECIFIED   
 
  ,    
 
  UNCOMPLICAT   
 
  ED            
 
              
 
 L02.91       CUTANEOUS                                        
 
              ABSCESS,                                        
 
     UNSPECIFIED                                       
 
                          
 
            
 
 L03.90       CELLULITIS,                                       
 
                                                      
 
     UNSPECIFIED                                       
 
                          
 
            
 
 R10.9        UNSPECIFIED                                       
 
               ABDOMINAL                                        
 
    PAIN                                              
 
                          
 
 R79.89       OTHER                                        
 
              SPECIFIED                                        
 
     ABNORMAL                                        
 
  FINDINGS OF             
 
   BLOOD    
 
  CHEMISTRY     
 
                
 
          
 
                                                                                
                                                                                
                                                                                
                                                                                
                                                                                
                                                                                
                                                    
 
Allergies, Adverse Reactions, Alerts
                      
 
 
 Type                
 
 Drug Allergy                
 
            Adverse Reaction to Substance            
 
 
 Substance              Reaction               Severity             
 
                   
 
 Naproxen               I-RASH                 Intermediate         
 
                    
 
 Codeine                I-RASH                 Intermediate         
 
                   
 
                                                                                
                           
 
Medications
                      
 
 
 Na  ND  Rx  Da  Fi  Fi  Am  Da  Di  Ph  RX  Ph  St
 
 me  C   No  te  ll  ll  ou  ys  ag  ar   #  ys  at
 
         rm        s   nt      no  ma      ic  us
 
             Or  Da              si  cy      ia    
 
             de  te              s           n     
 
             re                                    
 
             d                                     
 
                                                   
 
                                                   
 
                                                   
 
                                                  
 
                                   
 
                           
 
                      
 
               
 
              
 
 DE  68      09  09      30  8           00  [place]  Ac
 
 OM  38      -0  -2      .0              00  L-  ti
 
 ET  20      4-  9-      00              07  MA  ve
 
 HA  04      20  20                      50  RT    
 
 ZI  10      17  17                      75       
 
 NE  1                                   29  PH    
 
                                            AR    
 
 25                                          MA    
 
                                            CY    
 
 MG                                             
 
                                      #5    
 
 TA                                    91 
 
 BL                                    
 
 ET                                    
 
                                
 
                           
 
                           
 
                           
 
                  
 
                  
 
                  
 
               
 
               
 
 HY  00      09  09      10  3           00  [place]  Ac
 
 DR  40      -0  -2      .0              00  L-  ti
 
 OC  60      4-  9-      00              02  MA  ve
 
 OD  12      20  20                      24  RT    
 
 ON  50      17  17                      17       
 
 -A  1                                   72  PH    
 
 CE                                          AR    
 
 TA                                          MA    
 
 MI                                          CY    
 
 NO                                             
 
 PH                                    #5    
 
 N                                     91 
 
 10                                    
 
 -3                                    
 
 25                             
 
                           
 
                           
 
                           
 
                  
 
                  
 
                  
 
               
 
               
 
               
 
 JAIME  53      09  09      20  10          00  [place]  Ac
 
 LF  74      -0  -2      .0              00  L-  ti
 
 AM  60      4-  9-      00              07  MA  ve
 
 ET  27      20  20                      50  RT    
 
 HO  20      17  17                      75       
 
 XA  5                                   30  PH    
 
 ZO                                          AR    
 
 LE                                          MA    
 
 -T                                          CY    
 
 MP                                              
 
                                      #5    
 
 DS                                    91 
 
                                      
 
 TA                                    
 
 BL                             
 
 ET                        
 
                           
 
                           
 
                  
 
                  
 
                  
 
               
 
               
 
               
 
               
 
 TR  57      09  09      6.  3           00  HO  Ac
 
 AM  66      -0  -2      00              00  ME  ti
 
 AD  40      6-  9-      0               04  TO  ve
 
 OL  37      20  20                      02  WN    
 
    71      17  17                      44       
 
 HC  8                                   70  PH    
 
 L                                           AR    
 
 50                                          MA    
 
                                            CY    
 
 MG                                             
 
                                      OF    
 
 TA                                     
 
 BL                                 CY 
 
 ET                                 NT 
 
                             HI 
 
                        AN 
 
                        A  
 
                           
 
                  
 
                  
 
                  
 
                  
 
                  
 
               
 
               
 
              
 
 CL  00      08  09      40  10          00  HO  Ac
 
 IN  59      -2  -2      .0              00  ME  ti
 
 DA  12      9-  2-      00              06  TO  ve
 
 MY  93      20  20                      09  WN    
 
 CI  20      17  17                      32       
 
 N   1                                   77  PH    
 
 HC                                          AR    
 
 L                                           MA    
 
 30                                          CY    
 
 0                                               
 
 MG                                    OF    
 
                                        
 
 CA                                 CY 
 
 PS                                 NT 
 
 UL                          HI 
 
 E                      AN 
 
                        A  
 
                           
 
                  
 
                  
 
                  
 
                  
 
                  
 
                  
 
                 
 
              
 
 OX  53      08  09      10  2           00  HO  Ac
 
 YC  74      -2  -2      .0              00  ME  ti
 
 OD  60      9-  2-      00              02  TO  ve
 
 ON  20      20  20                      01  WN    
 
 E-  30      17  17                      44       
 
 AC  5                                   93  PH    
 
 ET                                          AR    
 
 AM                                          MA    
 
 IN                                          CY    
 
 OP                                             
 
 HE                                    OF    
 
 N                                       
 
 5-                                 CY 
 
 32                                 NT 
 
 5                           HI 
 
                        AN 
 
                        A  
 
                           
 
                  
 
                  
 
                  
 
                  
 
                  
 
                 
 
               
 
              
 
 SO  00          07          0                   No
 
 DI  40          -0                               
 
 UM  97          3-                              Lo
 
    98          20                              ng
 
 CH  30          13                              er
 
 LO  9                                            
 
 RI                                              Ac
 
 DE                                              ti
 
                                                ve
 
 0.                                              
 
 9%                                          
 
                                            
 
 SO                                          
 
 NOMAN                                          
 
 TI                                      
 
 ON                                    
 
                                       
 
                                    
 
               
 
               
 
               
 
               
 
               
 
               
 
               
 
 Sa  63          07          0                   No
 
 li  80          -0                               
 
 ne  70          3-                              Lo
 
    10          20                              ng
 
 Fl  07          13                              er
 
 us  5                                            
 
 h                                               Ac
 
 10                                              ti
 
 ML                                              ve
 
                                                
 
 Sy                                          
 
 ri                                          
 
 ng                                          
 
 e                                           
 
                                         
 
                                       
 
                                       
 
                                    
 
               
 
               
 
               
 
               
 
              
 
 AL  00      03  05  5   90  30      EA  16  AR  Ac
 
 DE  78      -1  -1      .0          ST  82  NO  ti
 
 AZ  11      7-  7-      00          SI  20  LD  ve
 
 OL  08      20  20                  DE           
 
 AM  90      10  10                         RI    
 
  2  5                               PH      CH    
 
                                    AR      AR    
 
 MG                                  MA      D     
 
                                    CY      W     
 
 TA                                             
 
 BL                         OF            
 
 ET                                   
 
                         CY      
 
                         NT      
 
                  HI      
 
                AN      
 
                A       
 
                       
 
                  
 
                  
 
                  
 
               
 
               
 
               
 
               
 
              
 
     00      03  05  1   15  25      EA  16  GI  Ac
 
     59      -2  -1      0.          ST  98  LB  ti
 
     10      9-  7-      00          SI  21  ER  ve
 
     38      20  20      0           DE      T     
 
     50      10  10                         [place]    
 
     1                               PH      HN    
 
                                     AR       W    
 
                                     MA            
 
                                     CY            
 
                                                
 
                            OF            
 
                                      
 
                           CY      
 
                         NT      
 
                  HI      
 
                AN      
 
                A    
 
                     
 
               
 
               
 
               
 
               
 
               
 
               
 
               
 
              
 
 AL  00      03  04  5   90  30      EA  16  AR  Ac
 
 DE  78      -1  -1      .0          ST  82  NO  ti
 
 AZ  11      7-  7-      00          SI  20  LD  ve
 
 OL  08      20  20                  DE           
 
 AM  90      10  10                         RI    
 
  2  5                               PH      CH    
 
                                    AR      AR    
 
 MG                                  MA      D     
 
                                    CY      W     
 
 TA                                             
 
 BL                         OF            
 
 ET                                   
 
                         CY      
 
                         NT      
 
                  HI      
 
                AN      
 
                A       
 
                       
 
                  
 
                  
 
                  
 
               
 
               
 
               
 
               
 
              
 
     59      10  04  11  8.  20      EA  14  AR  Ac
 
     31      -2  -1      50          ST  82  NO  ti
 
     00      3-  6-      0           SI  09  LD  ve
 
     57      20  20                  DE           
 
     92      09  10                         RI    
 
     0                               PH      CH    
 
                                     AR      AR    
 
                                     MA      D     
 
                                     CY      W     
 
                                                 
 
                            OF            
 
                                     
 
                         CY      
 
                         NT      
 
                  HI      
 
                AN      
 
                A       
 
                       
 
               
 
               
 
               
 
               
 
               
 
               
 
               
 
              
 
 TI  00      03  03  1   90  30      EA  16  GI  Ac
 
 ZA  18      -3  -3      .0          ST  99  LB  ti
 
 NI  54      0-  0-      00          SI  57  ER  ve
 
 DI  40      20  20                  DE      T     
 
 NE  02      10  10                         [place]    
 
    3                               PH      HN    
 
 HC                                  AR       W    
 
 L                                   MA            
 
 4                                   CY            
 
 MG                                             
 
                           OF            
 
 TA                                   
 
 BL                      CY      
 
 ET                      NT      
 
                  HI      
 
                AN      
 
                A    
 
                     
 
                  
 
                  
 
                  
 
                  
 
                  
 
               
 
               
 
              
 
     00      03  03  1   15  25      EA  16  GI  Ac
 
     59      -2  -2      0.          ST  98  LB  ti
 
     10      9-  9-      00          SI  21  ER  ve
 
     38      20  20      0           DE      T     
 
     50      10  10                         [place]    
 
     1                               PH      HN    
 
                                     AR       W    
 
                                     MA            
 
                                     CY            
 
                                                
 
                            OF            
 
                                      
 
                           CY      
 
                         NT      
 
                  HI      
 
                AN      
 
                A    
 
                     
 
               
 
               
 
               
 
               
 
               
 
               
 
               
 
              
 
 AL  00      03  03  5   90  30      EA  16  AR  Ac
 
 DE  78      -1  -1      .0          ST  82  NO  ti
 
 AZ  11      7-  7-      00          SI  20  LD  ve
 
 OL  08      20  20                  DE           
 
 AM  90      10  10                         RI    
 
  2  5                               PH      CH    
 
                                    AR      AR    
 
 MG                                  MA      D     
 
                                    CY      W     
 
 TA                                             
 
 BL                         OF            
 
 ET                                   
 
                         CY      
 
                         NT      
 
                  HI      
 
                AN      
 
                A       
 
                       
 
                  
 
                  
 
                  
 
               
 
               
 
               
 
               
 
              
 
 TI  00      03  03  0   60  30      EA  16  MI  Ac
 
 ZA  18      -0  -0      .0          ST  57  CK  ti
 
 NI  54      1-  1-      00          SI  54     ve
 
 DI  40      20  20                  DE      GR    
 
 NE  02      10  10                         EG    
 
    3                               PH      OR    
 
 HC                                  AR      Y     
 
 L                                   MA      E     
 
 4                                   CY            
 
 MG                                             
 
                           OF            
 
 TA                                   
 
 BL                      CY      
 
 ET                      NT      
 
                  HI      
 
                AN      
 
                A      
 
                     
 
                  
 
                  
 
                  
 
                  
 
                  
 
               
 
               
 
              
 
 LI  63      03  03  5   60  30      EA  16  MI  Ac
 
 DO  48      -0  -0      .0          ST  57  CK  ti
 
 DE  10      1-  1-      00          SI  56     ve
 
 RM  68      20  20                  DE      GR    
 
    70      10  10                         EG    
 
 5%  6                               PH      OR    
 
                                    AR      Y     
 
 PA                                  MA      E     
 
 TC                                  CY            
 
 H                                              
 
                            OF            
 
                                      
 
                         CY      
 
                         NT      
 
                  HI      
 
                AN      
 
                A      
 
                     
 
                 
 
               
 
               
 
               
 
               
 
               
 
               
 
              
 
 AL  67      02  02  00  90  30      EA  16  AR  Ac
 
 DE  25      -1  -2      .0          ST  43  NO  ti
 
 AZ  30      9-  6-      00          SI  25  LD  ve
 
 OL  90      20  20                  DE           
 
 AM  11      10  10                         RI    
 
    1                               PH      CH    
 
 0.                                  AR      AR    
 
 5                                   MA      D     
 
 MG                                  CY      W     
 
                                                
 
 TA                         OF            
 
 BL                         CY         
 
 ET                      NT      
 
                         HI      
 
                  AN      
 
                A       
 
                        
 
                       
 
                  
 
                  
 
                  
 
                  
 
               
 
               
 
              
 
     59      10  12  01  8.  15      EA  14  AR  Ac
 
     31      -2  -0      50          ST  82  NO  ti
 
     00      3-  3-      0           SI  09  LD  ve
 
     57      20  20                  DE           
 
     92      09  09                         RI    
 
     0                               PH      CH    
 
                                     AR      AR    
 
                                     MA      D     
 
                                     CY      W     
 
                                                 
 
                            OF            
 
                           CY         
 
                         NT      
 
                         HI      
 
                  AN      
 
                A       
 
                        
 
                       
 
               
 
               
 
               
 
               
 
               
 
               
 
              
 
     59      10  11  00  8.  15      EA  14  AR  Ac
 
     31      -2  -0      50          ST  82  NO  ti
 
     00      3-  5-      0           SI  09  LD  ve
 
     57      20  20                  DE           
 
     92      09  09                         RI    
 
     0                               PH      CH    
 
                                     AR      AR    
 
                                     MA      D     
 
                                     CY      W     
 
                                                 
 
                            OF            
 
                           CY         
 
                         NT      
 
                         HI      
 
                  AN      
 
                A       
 
                        
 
                       
 
               
 
               
 
               
 
               
 
               
 
               
 
              
 
 AZ  00      10  10  00  6.  6       EA  14  AR  Ac
 
 IT  09      -0  -2      00          ST  56  NO  ti
 
 HR  37      5-  2-      0           SI  82  LD  ve
 
 OM  14      20  20                  DE           
 
 YC  61      09  09                         RI    
 
 IN  8                               PH      CH    
 
                                    AR      AR    
 
 25                                  MA      D     
 
 0                                   CY      W     
 
 MG                                             
 
                           OF            
 
 TA                        CY         
 
 BL                      NT      
 
 ET                      HI      
 
                  AN      
 
                A       
 
                        
 
                       
 
                  
 
                  
 
                  
 
                  
 
                  
 
               
 
              
 
     00      10  10  00  60  20      EA  14  AR  Ac
 
     25      -0  -2      .0          ST  56  NO  ti
 
     83      5-  2-      00          SI  81  LD  ve
 
     67      20  20                  DE           
 
     80      09  09                         RI    
 
     1                               PH      CH    
 
                                     AR      AR    
 
                                     MA      D     
 
                                     CY      W     
 
                                                 
 
                            OF            
 
                            CY         
 
                         NT      
 
                         HI      
 
                  AN      
 
                A       
 
                        
 
                       
 
               
 
               
 
               
 
               
 
               
 
               
 
              
 
 IN  00      07  10  02  60  20      EA  13  MO  Ac
 
 DO  78      -1  -2      .0          ST  52  SE  ti
 
 ME  12      7-  2-      00          SI  24  S   ve
 
 TH  32      20  20                  DE      ST    
 
 AC  51      09  09                         EP    
 
 IN  0                               PH      HE    
 
                                    AR      N     
 
 25                                  MA      A     
 
                                    CY            
 
 MG                                              
 
                           OF            
 
 CA                         CY         
 
 PS                      NT      
 
 UL                      HI      
 
 E                AN      
 
                A       
 
                       
 
                     
 
                  
 
                  
 
                  
 
                  
 
                  
 
                 
 
              
 
 AM  00      09  10  00  15  5       EA  14  HE  Ac
 
 OX  78      -2  -0      .0          ST  43  ND  ti
 
 IC  12      5-  8-      00          SI  14  ER  ve
 
 IL  61      20  20                  DE      SO    
 
 LI  30      09  09                         N     
 
 N   5                               PH      RO    
 
 50                                  AR      BE    
 
 0                                   MA      RT    
 
 MG                                  CY       W    
 
                                               
 
 CA                         OF            
 
 PS                         CY         
 
 UL                      NT      
 
 E                       HI      
 
                  AN      
 
                A       
 
                        
 
                        
 
                  
 
                  
 
                  
 
                  
 
                 
 
               
 
              
 
     00      09  10  00  20  3       EA  14  HE  Ac
 
     05      -2  -0      .0          ST  43  ND  ti
 
     44      5-  8-      00          SI  13  ER  ve
 
     65      20  20                  DE      SO    
 
     02      09  09                         N     
 
     9                               PH      RO    
 
                                     AR      BE    
 
                                     MA      RT    
 
                                     CY       W    
 
                                                
 
                            OF            
 
                            CY         
 
                         NT      
 
                         HI      
 
                  AN      
 
                A       
 
                        
 
                        
 
               
 
               
 
               
 
               
 
               
 
               
 
              
 
 IN  00      07  09  01  60  20      EA  13  MO  Ac
 
 DO  78      -1  -1      .0          ST  52  SE  ti
 
 ME  12      7-  0-      00          SI  24  S   ve
 
 TH  32      20  20                  DE      ST    
 
 AC  51      09  09                         EP    
 
 IN  0                               PH      HE    
 
                                    AR      N     
 
 25                                  MA      A     
 
                                    CY            
 
 MG                                              
 
                           OF            
 
 CA                         CY         
 
 PS                      NT      
 
 UL                      HI      
 
 E                AN      
 
                A       
 
                       
 
                     
 
                  
 
                  
 
                  
 
                  
 
                  
 
                 
 
              
 
 IN  00      07  07  00  60  20      EA  13  MO  Ac
 
 DO  78      -1  -3      .0          ST  52  SE  ti
 
 ME  12      7-  0-      00          SI  24  S   ve
 
 TH  32      20  20                  DE      ST    
 
 AC  51      09  09                         EP    
 
 IN  0                               PH      HE    
 
                                    AR      N     
 
 25                                  MA      A     
 
                                    CY            
 
 MG                                              
 
                           OF            
 
 CA                         CY         
 
 PS                      NT      
 
 UL                      HI      
 
 E                AN      
 
                A       
 
                       
 
                     
 
                  
 
                  
 
                  
 
                  
 
                  
 
                 
 
              
 
 IN  00      06  07  01  30  10      EA  13  MO  Ac
 
 DO  78      -1  -1      .0          ST  15  SE  ti
 
 ME  12      6-  6-      00          SI  69  S   ve
 
 TH  32      20  20                  DE      ST    
 
 AC  51      09  09                         EP    
 
 IN  0                               PH      HE    
 
                                    AR      N     
 
 25                                  MA      A     
 
                                    CY            
 
 MG                                              
 
                           OF            
 
 CA                         CY         
 
 PS                      NT      
 
 UL                      HI      
 
 E                AN      
 
                A       
 
                       
 
                     
 
                  
 
                  
 
                  
 
                  
 
                  
 
                 
 
              
 
 IN  00      06  07  00  30  10      EA  13  MO  Ac
 
 DO  78      -1  -0      .0          ST  15  SE  ti
 
 ME  12      6-  2-      00          SI  69  S   ve
 
 TH  32      20  20                  DE      ST    
 
 AC  51      09  09                         EP    
 
 IN  0                               PH      HE    
 
                                    AR      N     
 
 25                                  MA      A     
 
                                    CY            
 
 MG                                              
 
                           OF            
 
 CA                         CY         
 
 PS                      NT      
 
 UL                      HI      
 
 E                AN      
 
                A       
 
                       
 
                     
 
                  
 
                  
 
                  
 
                  
 
                  
 
                 
 
              
 
     00      06  07  00  60  20      EA  13  MO  Ac
 
     55      -1  -0      .0          ST  15  SE  ti
 
     50      6-  2-      00          SI  70  S   ve
 
     16      20  20                  DE      ST    
 
     30      09  09                         EP    
 
     2                               PH      HE    
 
                                     AR      N     
 
                                     MA      A     
 
                                     CY            
 
                                                 
 
                            OF            
 
                            CY         
 
                         NT      
 
                         HI      
 
                  AN      
 
                A       
 
                       
 
                     
 
               
 
               
 
               
 
               
 
               
 
               
 
              
 
     00      06  06  00  15  3       EA  13  HE  Ac
 
     59      -0  -1      .0          ST  00  ND  ti
 
     10      4-  8-      00          SI  77  ER  ve
 
     38      20  20                  DE      SO    
 
     50      09  09                         N     
 
     1                               PH      RO    
 
                                     AR      BE    
 
                                     MA      RT    
 
                                     CY       W    
 
                                                
 
                            OF            
 
                            CY         
 
                         NT      
 
                         HI      
 
                  AN      
 
                A       
 
                        
 
                        
 
               
 
               
 
               
 
               
 
               
 
               
 
              
 
     00      05  05  00  20  3       EA  12  HE  Ac
 
     59      -0  -2      .0          ST  65  ND  ti
 
     10      7-  1          SI  12  ER  ve
 
     38      20  20                  DE      SO    
 
     50      09  09                         N     
 
     1                               PH      RO    
 
                                     AR      BE    
 
                                     MA      RT    
 
                                     CY       W    
 
                                                
 
                            OF            
 
                            CY         
 
                         NT      
 
                         HI      
 
                  AN      
 
                A       
 
                        
 
                        
 
               
 
               
 
               
 
               
 
               
 
               
 
              
 
 DE  00      05  05  00  12  2       EA  12  HE  Ac
 
 OM  78      -0  -2      .0          ST  65  ND  ti
 
 ET  11      7-            SI  13  ER  ve
 
 HA  83      20  20                  DE      SO    
 
 ZI  01      09  09                         N     
 
 NE  0                               PH      RO    
 
                                    AR      BE    
 
 25                                  MA      RT    
 
                                    CY       W    
 
 MG                                             
 
                           OF            
 
 TA                         CY         
 
 BL                      NT      
 
 ET                      HI      
 
                  AN      
 
                A       
 
                        
 
                        
 
                  
 
                  
 
                  
 
                  
 
                  
 
               
 
              
 
     00      05  05  00  15  3       EA  12  HE  Ac
 
     59      -0  -2      .0          ST  67  ND  ti
 
     10      8-  1          SI  12  ER  ve
 
     54      20  20                  DE      SO    
 
     00      09  09                         N     
 
     1                               PH      RO    
 
                                     AR      BE    
 
                                     MA      RT    
 
                                     CY       W    
 
                                                
 
                            OF            
 
                            CY         
 
                         NT      
 
                         HI      
 
                  AN      
 
                A       
 
                        
 
                        
 
               
 
               
 
               
 
               
 
               
 
               
 
              
 
 AM  00      05  05  00  30  10      EA  12  ST  Ac
 
 OX  78      -0  -2      .0          ST  60  EP  ti
 
 IC  12      4-  1-      00          SI  38  HE  ve
 
 IL  61      20  20                  DE      NS    
 
 LI  30      09  09                              
 
 N   5                               PH      KE    
 
 50                                  AR      VI    
 
 0                                   MA      N     
 
 MG                                  CY      C     
 
                                                
 
 CA                         OF            
 
 PS                         CY         
 
 UL                      NT      
 
 E                       HI      
 
                  AN      
 
                A       
 
                        
 
                       
 
                  
 
                  
 
                  
 
                  
 
                 
 
               
 
              
 
     00      04  04  00  20  5       EA  12  ST  Ac
 
     17      -1  -2      .0          ST  31  EP  ti
 
     26      3-  3-      00          SI  07  HE  ve
 
     35      20  20                  DE      NS    
 
     97      09  09                              
 
     0                               PH      KE    
 
                                     AR      VI    
 
                                     MA      N     
 
                                     CY      C     
 
                                                
 
                            OF            
 
                            CY         
 
                         NT      
 
                         HI      
 
                  AN      
 
                A       
 
                        
 
                       
 
               
 
               
 
               
 
               
 
               
 
               
 
              
 
 AM  00      04  04  00  30  10      EA  12  ST  Ac
 
 OX  78      -0  -2      .0          ST  26  EP  ti
 
 IC  12      9-  3-      00          SI  87  HE  ve
 
 IL  61      20  20                  DE      NS    
 
 LI  30      09  09                              
 
 N   5                               PH      KE    
 
 50                                  AR      VI    
 
 0                                   MA      N     
 
 MG                                  CY      C     
 
                                                
 
 CA                         OF            
 
 PS                         CY         
 
 UL                      NT      
 
 E                       HI      
 
                  AN      
 
                A       
 
                        
 
                       
 
                  
 
                  
 
                  
 
                  
 
                 
 
               
 
              
 
     00      04  04  00  20  5       EA  12  ST  Ac
 
     60      -0  -2      .0          ST  26  EP  ti
 
     35      9-  3-      00          SI  86  HE  ve
 
     46      20  20                  DE      NS    
 
     82      09  09                              
 
     8                               PH      KE    
 
                                     AR      VI    
 
                                     MA      N     
 
                                     CY      C     
 
                                                
 
                            OF            
 
                            CY         
 
                         NT      
 
                         HI      
 
                  AN      
 
                A       
 
                        
 
                       
 
               
 
               
 
               
 
               
 
               
 
               
 
              
 
 CY  59      04  04  00  21  7       CL  19  WR  Ac
 
 CL  74      -1  -2      .0          IN  16  IG  ti
 
 OB  60      3-  3-      00          IC  91  HT  ve
 
 EN  17      20  20                              
 
 ZA  70      09  09                  PH      AR    
 
 DE  6                               AR      DY    
 
 IN                                  MA       C    
 
 E                                   CY            
 
 10                                                
 
                                                
 
 MG                                       
 
                                      
 
 TA                              
 
 BL                              
 
 ET                       
 
                        
 
                     
 
                  
 
               
 
               
 
               
 
               
 
               
 
               
 
     00      04  04  00  21  7       CL  19  WR  Ac
 
     55      -1  -2      .0          IN  16  IG  ti
 
     51      3-  3-      00          IC  92  HT  ve
 
     88      20  20                              
 
     30      09  09                  PH      AR    
 
     2                               AR      DY    
 
                                     MA       C    
 
                                     CY            
 
                                                   
 
                                                 
 
                                          
 
                                       
 
                                 
 
                                 
 
                          
 
                        
 
                     
 
                  
 
     68      04  04  00  20  10      CL  19  WR  Ac
 
     77      -1  -2      .0          IN  16  IG  ti
 
     40      3-  3-      00          IC  90  HT  ve
 
     12      20  20                              
 
     26      09  09                  PH      AR    
 
     0                               AR      DY    
 
                                     MA       C    
 
                                     CY            
 
                                                   
 
                                                  
 
                                          
 
                                       
 
                                 
 
                                 
 
                          
 
                        
 
                     
 
                  
 
                                                                                
                                                                                
                                                                                
                                                                                
                                                                                
                                                                                
                      
 
Vital Signs
                      2013 17:32            
 
 
 Name         Value        Interpretat  Reference   Comment    
 
                           ion          Range                   
 
                               
 
      
 
 Body   98.7 [degF]                                       
 
 Temperature                                                    
 
                                                            
 
                      
 
 BP   108 mm[Hg]                                        
 
 Diastolic                                                      
 
                                                           
 
                    
 
 BP Systolic  174 mm[Hg]                                        
 
                                                                
 
                                                       
 
              
 
 Heart   78 /min                                           
 
 Rate/Pulse                                                     
 
                                                        
 
                     
 
 O2%          98 %                                              
 
                                                               
 
                                        
 
              
 
 Respiratory  18 /min                                           
 
  Rate                                                          
 
                                                        
 
                
 
            2013 16:15            
 
 
 Name         Value        Interpretat  Reference   Comment    
 
                           ion          Range                   
 
                               
 
      
 
 BP   98 mm[Hg]                                         
 
 Diastolic                                                      
 
                                                          
 
                    
 
 BP Systolic  161 mm[Hg]                                        
 
                                                                
 
                                                       
 
              
 
 Heart   85 /min                                           
 
 Rate/Pulse                                                     
 
                                                        
 
                     
 
 O2%          95 %                                              
 
                                                               
 
                                        
 
              
 
 Respiratory  20 /min                                           
 
  Rate                                                          
 
                                                        
 
                
 
                                                                                
                           
 
Results
                      
 
 
 Labs                
 
 
 
 
 Lab   Lab   Date    Result  Refere  Interp  Status  Commen
 
 Order   Detail                  nces   retati          t     
 
                                 Range   on                    
 
                                                            
 
                                  
 
                
 
 
 
 
 COMPREHENSIVE METABOLIC PANEL (2013 16:11)
 
                 
 
 
 
 
          Glucose    100               complet
 
             013   mg/dL                      ed     
 
        Bld-mCn  16:11                                      
 
  c                                            
 
                                 
 
                 
 
          BUN     8 mg/dL  7-18              complet
 
          Bld-mCn  013                              ed     
 
        c        16:11                                      
 
                                          
 
                            
 
           
 
          Creat     0.9   0.6-1.0           complet
 
          SerPl-m  013   mg/dL                      ed     
 
        Cnc      16:11                                      
 
                                                
 
                                 
 
           
 
          GFR     68   59-               complet
 
          (ESTIMA  013   ML/MIN                     ed     
 
        EVELINE)     16:11                                      
 
                                           
 
                                  
 
             
 
          Sodium     139   136-145           complet
 
          SerPl-s  013   mmoL/L                     ed     
 
        Cnc      16:11                                      
 
                                                
 
                                  
 
           
 
          Potassi    4.0   3.5-5.1           complet
 
          um   013   mmoL/L                     ed     
 
        SerPl-s  16:11                                      
 
  Cnc                                           
 
                                  
 
                   
 
          Chlorid  03-2  105               complet
 
          e   013   mmoL/L                     ed     
 
        SerPl-s  16:11                                      
 
  Cnc                                          
 
                                  
 
                   
 
          CO2   03-2  29   21.0-32           complet
 
          SerPl-s  013   mmoL/L   .0                ed     
 
        Cnc      16:11                                      
 
                                                  
 
                                  
 
           
 
          Calcium  0703-2  8.9   8.5-10.           complet
 
             013   mg/dL    1                 ed     
 
        SerPl-m  16:11                                      
 
  Cnc                                            
 
                                 
 
                   
 
          Prot   03-2  7.5   6.4-8.2           complet
 
          SerPl-m  013   gm/dL                      ed     
 
        Cnc      16:11                                      
 
                                                
 
                                 
 
           
 
          Albumin  07-03-2  3.5   3.4-5.0           complet
 
             013   gm/dL                      ed     
 
        SerPl-m  16:11                                      
 
  Cnc                                           
 
                                 
 
                   
 
          Globuli  03-2  4.0   1.3-3.2           complet
 
          n   013   gm/dL                      ed     
 
        Ser-mCn  16:11                                      
 
  c                                             
 
                                 
 
                 
 
          Albumin  03-2  0.9 UNK  1.1-1.8           complet
 
          /Glob   013                              ed     
 
        SerPl-m  16:11                                      
 
  Rto                                        
 
                              
 
                   
 
          Bilirub  -2  0.6   0.2-1.0           complet
 
             013   mg/dL                      ed     
 
        SerPl-m  16:11                                      
 
  Cnc                                           
 
                                 
 
                   
 
          AST   0703-2  13 U/L   15-37             complet
 
          SerPl-c  013                              ed     
 
        Cnc      16:11                                      
 
                                          
 
                              
 
           
 
          ALT   07-03-2  33 U/L   30-65             complet
 
          SerPl-c  013                              ed     
 
        Cnc      16:11                                      
 
                                          
 
                              
 
           
 
          ALP   07-03-2  103 U/L              complet
 
          SerPl-c  013                              ed     
 
        Cnc      16:11                                      
 
                                            
 
                              
 
           
 
 
 
 
 CBC with AUTO DIFF (2013 16:11)
 
                 
 
 
 
 
          WBC #   07-03-2  6.4   4.8-10.           complet
 
          Bld   013   K/MM3    8                 ed     
 
        Auto     16:11                                      
 
                                                 
 
                                 
 
             
 
          RBC #   07-03-2  4.07   4.2-5.4           complet
 
          Bld   013   M/mm3                      ed     
 
        Auto     16:11                                      
 
                                                
 
                                 
 
             
 
          Hgb   07-03-2  12.1   12.2-16           complet
 
          Bld-mCn  013   g/dL     .2                ed     
 
        c        16:11                                      
 
                                                  
 
                              
 
           
 
          Hct Fr   03-2  37.2 %   37.0-47           complet
 
          Bld      013            .0                ed     
 
                 16:11                                      
 
                                              
 
                      
 
           
 
          MCV RBC  -03-2  91.4 fl  82.2-97           complet
 
                   013            .8                ed     
 
                 16:11                                      
 
                                            
 
                      
 
           
 
          MCH RBC  03-2  29.8 pg  27-31.2           complet
 
           Qn   013                              ed     
 
        Auto     16:11                                      
 
                                             
 
                              
 
             
 
          MEAN   07-03-2  32.6   31.8-35           complet
 
          CORPUSC  013   g/dl     .4                ed     
 
        ULAR   16:11                                      
 
  HGB                                          
 
  CONC                          
 
                   
 
              
 
        
 
          RDW RBC  07-03-2  14.5 %   11.5-17           complet
 
           Auto    013            .5                ed     
 
                 16:11                                      
 
                                              
 
                         
 
           
 
          Platele  07-03-2  297   142-424           complet
 
          t Bld   013   K/mm3                      ed     
 
        Ql   16:11                                      
 
  Manual                                        
 
                                 
 
                   
 
          
 
          MEAN   -03-2  7.6 fl   7.4-10.           complet
 
          PLATELE  013            4                 ed     
 
        T   16:11                                      
 
  VOLUME                                     
 
                              
 
                   
 
          
 
          Granulo  07-03-2  71.8 %   37.0-80           complet
 
          cytes   013            .0                ed     
 
        Fr Bld   16:11                                      
 
  Auto                                        
 
                              
 
                   
 
        
 
          LYMPH %  07-03-2  20.9 %   10-50.0           complet
 
                   013                              ed     
 
                 16:11                                      
 
                                         
 
                      
 
           
 
          Monocyt  07-03-2  5.7 %    1.7-9.3           complet
 
          es Fr   013                              ed     
 
        Bld   16:11                                      
 
  Auto                                     
 
                              
 
                   
 
        
 
          Eosinop  07-03-2  1.3 %    0.1-12.           complet
 
          hil Fr   013            0                 ed     
 
        Bld   16:11                                      
 
  Auto                                      
 
                              
 
                   
 
        
 
          Basophi  07-03-2  0.3 %    0.1-2.0           complet
 
          ls Fr   013                              ed     
 
        Bld   16:11                                      
 
  Auto                                     
 
                              
 
                   
 
        
 
          Granulo  07-03-2  4.6   1.8-7.8           complet
 
          cytes #  013   K/mm3                      ed     
 
         Bld   16:11                                      
 
  Auto                                          
 
                                 
 
                   
 
        
 
          Lymphoc  07-03-2  1.3   0.7-4.5           complet
 
          ytes Fr  013   K/mm3                      ed     
 
         Bld   16:11                                      
 
  Auto                                          
 
                                 
 
                   
 
        
 
          Monocyt  07-03-2  0.4   0.1-1.0           complet
 
          es #   013   K/mm3                      ed     
 
        Bld   16:11                                      
 
  Auto                                          
 
                                 
 
                   
 
        
 
          Eosinop  07-03-2  0.1   0.0-0.4           complet
 
          hil #   013   K/mm3                      ed     
 
        Bld   16:11                                      
 
  Auto                                          
 
                                 
 
                   
 
        
 
          Basophi  07-03-2  0.0   0-0.2             complet
 
          ls #   013   K/MM3                      ed     
 
        Bld   16:11                                      
 
  Auto                                        
 
                                 
 
                   
 
        
 
 
 
 
 B-HCG Ur Ql (2013 15:51)
 
                 
 
 
 
 
          B-HCG   -03-2  NEGATIV  NEG               complet
 
          Ur Ql    013   E                          ed     
 
                 15:51                                      
 
                                         
 
                         
 
           
 
 
 
 
 URINALYSIS/COMPLETE (2013 15:51)
 
                 
 
 
 
 
          URINE   -03-2  YELLOW   YELLOW            complet
 
          COLOR    013                              ed     
 
                 15:51                                      
 
                                           
 
                         
 
           
 
          URINE   -2  CLEAR    CLEAR             complet
 
          APPEARA  013                              ed     
 
        NCE      15:51                                      
 
                                         
 
                              
 
           
 
          URINE   --2  NEGATIV  NEG               complet
 
          GLUCOSE  013   E                          ed     
 
         -   15:51                                      
 
  DIPSTIC                                
 
  K                           
 
                   
 
            
 
          URINE   07-03-2  NEGATIV  NEG               complet
 
          BILIRUB  013   E                          ed     
 
        IN -   15:51                                      
 
  DIPSTIC                                
 
  K                           
 
                   
 
            
 
          URINE   07-03-2  NEGATIV  NEG               complet
 
          KETONE   013   E mg/dL                    ed     
 
                 15:51                                      
 
                                           
 
                               
 
           
 
          URINE   07-03-2  1.020   1.005-1           complet
 
          SPECIFI  013   UNK      .030              ed     
 
        C   15:51                                      
 
  GRAVITY                                          
 
                                
 
                   
 
           
 
          URINE   07-03-2  TRACE-I  NEG               complet
 
          BLOOD    013   NTACT                      ed     
 
                 15:51                                      
 
                                           
 
                            
 
           
 
          URINE   07-03-2  6.0 UNK  5.0-8.5           complet
 
          PH       013                              ed     
 
                 15:51                                      
 
                                            
 
                      
 
           
 
          URINE   07-03-2  NEGATIV  NEG               complet
 
          PROTEIN  013   E mg/dL                    ed     
 
         -   15:51                                      
 
  DIPSTIC                                  
 
  K                                
 
                   
 
            
 
          URINE   07-03-2  0.2   NEG               complet
 
          UROBILI  013   E.U./dL                    ed     
 
        NOGEN -  15:51                                      
 
                                     
 
  DIPSTIC                          
 
  K                
 
              
 
            
 
          URINE   07-03-2  NEGATIV  NEG               complet
 
          NITRATE  013   E                          ed     
 
         -   15:51                                      
 
  DIPSTIC                                
 
  K                           
 
                   
 
            
 
          URINE   07-03-2  1+       NEG               complet
 
          LEUK   013                              ed     
 
        ESTERAS  15:51                                     
 
  E                                
 
                              
 
                 
 
          URINE   07-03-2  3-5   0                 complet
 
          RBC      013   rbc/hpf                    ed     
 
                 15:51                                    
 
                                           
 
                           
 
           
 
          URINE   07-03-2  5-10   O                 complet
 
          WBC      013   wbc/hpf                    ed     
 
                 15:51                                    
 
                                           
 
                           
 
           
 
          URINE   07-03-2  5-10   0-5               complet
 
          SQUAMOU  013   #/hpf                      ed     
 
        S CELLS  15:51                                      
 
                                           
 
                                 
 
                
 
          URINE   07-03-2  2+       O                 complet
 
          BACTERI  013                              ed     
 
        A        15:51                                   
 
                                   
 
                            
 
           
 
                                                                                
                                                                                
                                                                                
                                                                                
                                                                                
                                                                                
                                                                                
                                                                       
 
Procedures
                      
 
 
 Procedure  DOS        Code       Location   Performer  Comment  
 
                                                                 
 
                                                 
 
 INCISION     52396      [facility]   [facility]             
 
 &                        PHYSICIAN                     
 
 DRAINAGE                     S, PLLC            
 
 ABSCESS                  
 
 COMPLICAT             
 
 ED/MULTIP     
 
 LE            
 
               
 
        
 
 HEPATITIS    35133      [facility]            
 
  C   5                     [facility]           
 
 ANTIBODY                     INC        INC       
 
                                     
 
                      
 
 CULTURE     67972      [facility]            
 
 BACTERIAL  5                     [facility]           
 
  BLOOD                     INC        INC       
 
 AEROBIC                            
 
 W/ID              
 
 ISOLATES      
 
               
 
              
 
 IV     05168      [facility]            
 
 INFUSION   5                     [facility]           
 
 THERAPY/P                    INC        INC       
 
 ROPHYLAXI                           
 
 S /DX 1ST             
 
  TO 1 HR      
 
               
 
              
 
 COMPREHEN    10975      [facility]            
 
 SIVE   5                     [facility]           
 
 METABOLIC                    INC        INC       
 
  PANEL                              
 
                       
 
            
 
 HEPATITIS    16393      [facility]            
 
  A   5                     [facility]           
 
 ANTIBODY                     INC        INC       
 
 HAAB                                
 
                       
 
          
 
 BLOOD     95694      [facility]            
 
 COUNT   5                     [facility]           
 
 COMPLETE                     INC        INC       
 
 AUTO&AUTO                           
 
  DIFRNTL              
 
 WBC           
 
               
 
         
 
 [place]     [address]      [facility]            
 
 LACTATE    5                     [facility]           
 
                              INC        INC       
 
                                   
 
             
 
 HEPATITIS    86100      [facility] MELENDREZ CORE   5                     [facility]           
 
 ANTIBODY                     INC        INC       
 
 HBCAB                            
 
 TOTAL                 
 
               
 
           
 
 HEPATITIS    62150      [facility] PINAON            
 
  PARVIN SURF   5                     [facility]           
 
 ANTIBODY                     INC        INC       
 
 HBSAB                               
 
                       
 
           
 
 [place]     [address]      [facility]            
 
 HEPATITIS  5                     [facility]           
 
  B                     INC        INC       
 
 SURFACE                            
 
 ANTIGEN               
 
               
 
             
 
 [place]   10-  [address]      [facility]            
 
 THYROID   5                     [facility]           
 
 STIMULATI                    INC        INC       
 
 NG                            
 
 HORMONE              
 
 TSH           
 
               
 
         
 
 [place]   10-  [address]      [facility]            
 
 THYROXINE  5                     [facility]           
 
  TOTAL                       INC        INC       
 
                                     
 
                    
 
 COLLECTIO  10-  08182      [facility]            
 
 N VENOUS   5                     [facility]           
 
 BLOOD                     INC        INC       
 
 VENIPUNCT                           
 
 URE                   
 
               
 
         
 
 BLOOD   10-  37635      [facility]            
 
 COUNT   5                     [facility]           
 
 COMPLETE                     INC        INC       
 
 AUTO&AUTO                           
 
  DIFRNTL              
 
 WBC           
 
               
 
         
 
 COMPREHEN  10-  73397      [facility]            
 
 SIVE   5                     [facility]           
 
 METABOLIC                    INC        INC       
 
  PANEL                              
 
                       
 
            
 
 HEMOGLOBI  10-  39252      [facility]            
 
 N   5                     [facility]           
 
 GLYCOSYLA                    INC        INC       
 
 EVELINE A1C                             
 
                       
 
             
 
 BLOOD     76269      [facility]            
 
 COUNT   5                     [facility]           
 
 COMPLETE                     INC        INC       
 
 AUTO&AUTO                           
 
  DIFRNTL              
 
 WBC           
 
               
 
         
 
 HEPATITIS    07648      [facility]            
 
  C   5                     [facility]           
 
 ANTIBODY                     INC        INC       
 
                                     
 
                      
 
 COMPREHEN    73069      [facility]            
 
 SIVE   5                     [facility]           
 
 METABOLIC                    INC        INC       
 
  PANEL                              
 
                       
 
            
 
 HEPATITIS    70212      [facility]            
 
  A   5                     [facility]           
 
 ANTIBODY                     INC        INC       
 
 HAAB                                
 
                       
 
          
 
 COLLECTIO    36644      [facility]            
 
 N VENOUS   5                     [facility]           
 
 BLOOD                     INC        INC       
 
 VENIPUNCT                           
 
 URE                   
 
               
 
         
 
 THROMBOPL    06773      [facility]            
 
 ASTIN   5                     [facility]           
 
 TIME                     INC        INC       
 
 PARTIAL                            
 
 PLASMA/WH             
 
 OLE BLOOD     
 
               
 
               
 
 INF AGT           [facility]            
 
 AB DETECT  5                     [facility]           
 
  EIA TECH                    INC        INC       
 
                             
 
 HIV-1&/HI             
 
 V-2 SCR       
 
               
 
             
 
 HEPATITIS    70897      [facility] MELENDREZ CORE   5                     [facility]           
 
 ANTIBODY                     INC        INC       
 
 HBCAB                            
 
 TOTAL                 
 
               
 
           
 
 HEPATITIS    46908      [facility]   [facility]            
 
  B SURF   5                     [facility]           
 
 ANTIBODY                     INC        INC       
 
 HBSAB                               
 
                       
 
           
 
 [place]     [address]      [facility]            
 
 THYROID   5                     [facility]           
 
 STIMULATI                    INC        INC       
 
 NG                            
 
 HORMONE              
 
 TSH           
 
               
 
         
 
 [place]     [address]      [facility]            
 
 AMMONIA    5                     [facility]           
 
                              INC        INC       
 
                                   
 
             
 
 [place]     [address]      [facility] SANC[facility]            
 
 HEPATITIS  5                     [facility]           
 
  B                     INC        INC       
 
 SURFACE                            
 
 ANTIGEN               
 
               
 
             
 
 [place]     [address]      [facility] SANC[facility]Z            
 
 THYROXINE  5                     [facility]           
 
  TOTAL                       INC        INC       
 
                                     
 
                    
 
 PROTHROMB    29711      [facility]            
 
 IN TIME    5                     [facility]           
 
                              INC        INC       
 
                                   
 
             
 
 RADEX     74537      [facility]            
 
 SPINE   5                     MEDICAL                      
 
 LUMBOSACR                    IMAGING            
 
 AL 2/3       ASS        
 
 VIEWS                   
 
                   
 
           
 
 [place]     [address]      [place]   BOND ALL            
 
 HEAD/BRAI  5                     MEDICAL                      
 
 N W/O                     IMAGING            
 
 CONTRAST       ASS        
 
 MATERIAL                
 
                   
 
              
 
 RADIOLOGI    69107      [place]   ELLIS ALL            
 
 C EXAM   5                     MEDICAL                      
 
 CHEST 2                     IMAGING            
 
 VIEWS       ASS        
 
 FRONTAL&L               
 
 ATERAL            
 
               
 
            
 
 INITIAL     85864      RENEE SADI RENEE SADI            
 
 INPATIENT  4                                                   
 
  CONSULT                                        
 
 NEW/ESTAB       
 
  PT 20      
 
 MIN           
 
               
 
         
 
 [place]     [address]      RAJESH   RAJESH            
 
 ABDOMEN &  4                     PAMELA        PAMELA                
 
  PELVIS                                          
 
 W/O                
 
 CONTRAST      
 
 MATERIAL      
 
               
 
              
 
 OPHTH     43734      FISH ARNOLD,            
 
 MEDICAL   0                     VISION     GENA M           
 
 XM&EVAL                                          
 
 COMPRHNSV                       
 
  ESTAB PT     
 
  1/>          
 
               
 
          
 
 TENS           EMPI INC   EMPI INC            
 
 DEVICE   0                                                   
 
 4/MORE                                        
 
 LEADS        
 
 MULTI      
 
 NERVE      
 
 STIMULATI     
 
 ON            
 
               
 
        
 
 APPLICATI    36226      [facility]            
 
 ON   0                     [facility]           
 
 MODALITY                     INC        INC       
 
 1/> AREAS                           
 
  HOT/COLD             
 
  PACKS        
 
               
 
            
 
 APPL     83820      [facility]            
 
 MODALITY   0                     [facility]           
 
 1/> AREAS                    INC        INC       
 
  ELEC                            
 
 STIMJ              
 
 UNATTENDE     
 
 D             
 
               
 
       
 
 THERAPEUT    69638      [facility]            
 
 IC PX 1/>  0                     [facility]           
 
  AREAS                     INC        INC       
 
 EACH 15                            
 
 MIN              
 
 EXERCISES     
 
               
 
               
 
 APPL     60955      [facility]            
 
 MODALITY   0                     [facility]           
 
 1/> AREAS                    INC        INC       
 
  ELEC                            
 
 STIMJ EA              
 
 15 MIN        
 
               
 
            
 
 APPL     92176      [facility]            
 
 MODALITY   0                     [facility]           
 
 1/> AREAS                    INC        INC       
 
  ELEC                            
 
 STIMJ              
 
 UNATTENDE     
 
 D             
 
               
 
       
 
 APPLICATI    20675      [facility]            
 
 ON   0                     [facility]           
 
 MODALITY                     INC        INC       
 
 1/> AREAS                           
 
  HOT/COLD             
 
  PACKS        
 
               
 
            
 
 THERAPEUT    07654      [facility]            
 
 IC PX 1/>  0                     [facility]           
 
  AREAS                     INC        INC       
 
 EACH 15                            
 
 MIN              
 
 EXERCISES     
 
               
 
               
 
 THERAPEUT    67587      [facility]            
 
 IC PX 1/>  0                     [facility]           
 
  AREAS                     INC        INC       
 
 EACH 15                            
 
 MIN              
 
 EXERCISES     
 
               
 
               
 
 PHYSICAL     66457      [facility]            
 
 THERAPY   0                     [facility]   [facility]           
 
 EVALUATIO                    INC        INC       
 
 N                                   
 
                       
 
       
 
 APPLICATI    64153      [facility]            
 
 ON   0                     [facility]           
 
 MODALITY                     INC        INC       
 
 1/> AREAS                           
 
  HOT/COLD             
 
  PACKS        
 
               
 
            
 
 APPL     82596      [facility]            
 
 MODALITY   0                     [facility]           
 
 1/> AREAS                    INC        INC       
 
  ELEC                            
 
 STIMJ              
 
 UNATTENDE     
 
 D             
 
               
 
       
 
 PSYCHOLOG    01355      PHYSICIAN  VERNON THOMAS   0                     S   TASH BOYKIN          
 
 TESTING                     SERVICES             
 
 ADMN BY       [facility]                  
 
 TECH DE                
 
 HR                
 
               
 
        
 
 RADEX     04192      NIDIA DUQUE,            
 
 SPINE   0                     MEDICAL   SHELLY GÓMEZ           
 
 LUMBOSACR                    IMAGING             
 
 AL       ASSOCIATE           
 
 MINIMUM 4    S          
 
  VIEWS                  
 
                 
 
            
 
 LEVEL III  10-  04909      ORAL   ORAL            
 
  SURG   9                     PATHOLOGY  PATHOLOGY          
 
 PATHOLOGY                         
 
        LABORATOR  LABORATOR 
 
 GROSS&[facility]    Y          Y         
 
 ROSCOPIC                          
 
 EXAM              
 
               
 
          
 
 ORTHOPANT    19561      SEBAS MULLEN           
 
 OGRAM      9                     DANIEL ROBERT W          W         
 
                                 
 
         
 
 THER     03697      SEBAS MULLEN           
 
 PROPH/DX   9                     DANIEL ROBERT           
 
 NJX IV                     W          W         
 
 PUSH                            
 
 SINGLE/1S         
 
 T      
 
 SBST/DRUG     
 
               
 
               
 
 BIOPSY OF    41060      SEBAS MULLEN           
 
  LIP       9                     , DANIEL SANCHEZ          W         
 
                                
 
         
 
 DEEP           SEBAS MULLEN           
 
 SEDATION/  DANIEL Ivy ROBERT           
 
 GENERAL                     W          W         
 
 ANESTHESI                           
 
 A-1ST 30          
 
 MINUTES       
 
               
 
             
 
 3D     96656      REYNA C  RAJESH,           
 
 RENDERING  9                      RAJESH   REYNA           
 
  W/INTERP                                         
 
  &                           
 
 POSTPROCE     
 
 SS      
 
 SUPERVISI     
 
 ON            
 
               
 
        
 
 MRI     74460      REYNA C  RAJESH,           
 
 SPINAL   9                      RAJESH   REYNA           
 
 CANAL                                          
 
 LUMBAR                           
 
 W/O      
 
 CONTRAST      
 
 MATERIAL      
 
               
 
              
 
 RADEX     53102      [facility]   [facility]            
 
 SPINE   9                     [facility]           
 
 LUMBOSACR                    INC        INC       
 
 AL                            
 
 MINIMUM 4             
 
  VIEWS        
 
               
 
            
 
                                                                                
                                                                                
                                                                                
                                                                                
                                                                                
                                                                                
                                                                                
                                                                                
                              
 
Encounters
                      
 
 
 Encounter  Start   End Date   Code       Location   Performer
 
  Type      Date                                                 
 
                                                        
 
                
 
 EMERGENCY    84071      [facility] RAMESH  
 
    7          7                     PHYSICIAN           
 
 BIBI                               S, PLL[facility]         
 
 T VISIT                    
 
 HIGH/URGE             
 
 NT      
 
 SEVERITY      
 
               
 
              
 
 [facility]                [facility]            
 
 -   5          5                     [facility]            
 
 OUTPATIEN                                   INC                 
 
 T                                             
 
                   
 
       
 
 EMERGENCY    27217      [facility]            
 
    5          5                     [facility]            
 
 DEPARTMEN                               INC                 
 
 T VISIT                            
 
 MODERATE          
 
 SEVERITY      
 
               
 
              
 
 EMERGENCY    07372      [facility] ROMERO 
 
    5          5                     PHYSICIAN  [facility]      
 
 DEPARTMEN                               S, [facility]             
 
 T VISIT                      
 
 HIGH/URGE             
 
 NT      
 
 SEVERITY      
 
               
 
              
 
 HOSPITAL   10-  10-             [facility]            
 
 -   5          5                     [facility]            
 
 OUTPATIEN                                   INC                 
 
 T                                             
 
                   
 
       
 
 OFFICE     48556      [facility]   HEATHER 
 
 OUTPATIEN  5          5                     PHYSICIAN  [facility]      
 
 T VISIT                                S GROUP             
 
 15                      
 
 MINUTES               
 
               
 
             
 
 HOSPITAL                [facility]            
 
 -   5          5                     [facility]            
 
 OUTPATIEN                                   INC                 
 
 T                                             
 
                   
 
       
 
 Emergency    MOHINI Soliz MD
 
 (ER)       3 15:50    3 17:45               [facility]            
 
                                                
 
                  
 
 OFFICE     58872      JOYCE COOK OUTPATIEN  0          0                     VINICIO COOPER
 
 T VISIT                                                    
 
 15                            
 
 MINUTES       
 
               
 
             
 
 OFFICE     09117      JOYCE COOK OUTPATIEN  0          0                     VINICIO COOPER
 
 T VISIT                                                    
 
 15                            
 
 MINUTES       
 
               
 
             
 
 OFFICE     40569      PHYSICIAN  VISHNU CRUZ  0          0                     S   SHAHLA COOPER   
 
 T VISIT                                SERVICES            
 
 25          PSC            
 
 MINUTES                 
 
                   
 
             
 
 OFFICE     03060      JOYCE COOK OUTPATIEN  0          0                     VINICIO COOPER
 
 T VISIT                                                    
 
 15                            
 
 MINUTES       
 
               
 
             
 
 HOSPITAL                [facility]            
 
 -   0          0                     [facility]            
 
 OUTPATIEN                                   INC                 
 
 T                                             
 
                   
 
       
 
 OFFICE     19745      PHYSICIAN  VISHNU THOMAS  0          0                     S   [facility]                                SERVICES            
 
 MINUTES           [facility]               
 
                         
 
                 
 
 HOSPITAL                [facility]            
 
 -   0          0                     [facility]            
 
 OUTPATIEN                                   INC                 
 
 T                                             
 
                   
 
       
 
 EMERGENCY    69029      [facility]            
 
    0          0                     [facility]            
 
 DEPARTMEN                               INC                 
 
 T VISIT                            
 
 LOW/MODER         
 
  SEVERITY     
 
               
 
               
 
 EMERGENCY    53756      MERLE ROMERO, 
 
    0          0                     EMERGENCY  [facility]
 
 DEPARTMEN                                SERVICES           
 
 T VISIT                    
 
 HIGH/URGE     ASSOCIATE 
 
 NT      S         
 
 SEVERITY                
 
                 
 
              
 
 OFFICE   10-  10-  38063      JOYCE COOK OUTPATIEN  9          9                     VINICIO COOPER
 
 T NEW 30                                                    
 
 MINUTES                             
 
               
 
             
 
 OFFICE     02698      [place]   CONSUELO, 
 
 CONSULTAT  9          9                     MEDICAL   [place] A
 
 ION                                SERV            
 
 NEW/ESTAB         [facility]         
 
  PATIENT                
 
 40 MIN                  
 
               
 
            
 
 OFFICE     62025      ASA REYES
 
 OUTPATIEN  9          9                     [facility]                                 [facility]                
 
 15                    
 
 MINUTES            
 
               
 
             
 
 OFFICE     [id number]      AS[facility] REYESEN  9          9                     [facility]                                 [facility]                
 
 15                    
 
 MINUTES            
 
               
 
             
 
 [facility]                [facility]            
 
 -   9          9                     [facility]            
 
 [facility]                                   [facility]                 
 
 T                                             
 
                   
 
       
 
 OFFICE     [id number]      ASA REYES
 
 OUTPATITRENTON  9          9                     [facility]                                 [facility]                
 
 15                    
 
 MINUTES            
 
               
 
             
 
 OFFICE     [id number]      ASA REYES  9          9                     [facility]                                 [facility]                
 
 15                    
 
 MINUTES            
 
               
 
             
 
 [facility]                [facility]            
 
 -   9          9                     [facility]            
 
 OUT[facility]IEN                                   [facility]                 
 
 T

## 2017-10-07 NOTE — EXTERNAL MEDICAL SUMMARY RPT
[facility] Patient Summary
 Created on: 10/04/2017
 
BERYL MANN
External Reference #: [id number]
: 68
Sex: Female
 
Demographics
 
 
 
 Address  [place]  [id number]
 
 Home Phone  [phone]
 
 Preferred Language  Unknown
 
 Marital Status  Unknown
 
 [employer] Affiliation  Unknown
 
 Race  Unknown
 
 Ethnic Group  Unknown
 
 
Author
 
 
 
 Author  [facility]IR Andrade, [employer] 
 
 Organization  [employer]
 
 Address  Unknown
 
 Phone  Unavailable
 
 
 
Results
 
 
 
 Vancomycin [Mass/volume] in Serum or Plasma --trough
 
 
 
 
 Observa  Value  Referen  Units  Interpr  Notes  Date
 
 tion   ce    etation  
 
   Range    
 
 
 
 
 Vancomyci  10.0 -   mcg/mL  Normal  No   Sep 3 
 
 n   20.0    informati  2017 
 
 [Mass/vol     on in   12:45 PM
 
 ume] in      source  
 
 Serum or      data 
 
 Plasma      
 
 --trough     
 
 
 
 
 Choriogonadotropin.beta subunit [Units] in 24 hour Urine
 
 
 
 
 Observa  Value  Referen  Units  Interpr  Notes  Date
 
 tion   ce    etation  
 
   Range    
 
 
 
 
 Choriogon  NEG  No   No   No   Sep 2 
 
 adotropin   informati  informati  informati  2017 
 
 .beta    on in   on in   on in   10:00 AM
 
 subunit    source   source   source  
 
 [Units]    data  data  data 
 
 in 24      
 
 hour      
 
 Urine     
 
 
 
 
 Basic metabolic panel in Blood
 
 
 
 
 Observa  Value  Referen  Units  Interpr  Notes  Date
 
 tion   ce    etation  
 
   Range    
 
 
 
 
 Urea   7 - 18  mg/dL  Low  No   Sep 2 
 
 nitrogen      informati  2017 6:25
 
 [Mass/vol     on in    AM
 
 ume] in      source  
 
 Serum or      data 
 
 Plasma     
 
 Calcium   8.5 -   mg/dL  Low  No   Sep 2 
 
 [Mass/vol  10.1    informati  2017 6:25
 
 ume] in      on in    AM
 
 Serum or      source  
 
 Plasma     data 
 
 Chloride   98 - 107  mmoL/L  Normal  No   Sep 2 
 
 [Moles/vo     informati  2017 6:25
 
 lume] in      on in    AM
 
 Serum or      source  
 
 Plasma     data 
 
 Carbon   21.0 -   mmoL/L  Normal  No   Sep 2 
 
 dioxide,   32.0    informati  2017 6:25
 
 total      on in    AM
 
 [Moles/vo     source  
 
 lume] in      data 
 
 Serum or      
 
 Plasma     
 
 Creatinin  0.55 -   mg/dL  Normal  No   Sep 2 
 
 e   1.02    informati  2017 6:25
 
 [Mass/vol     on in    AM
 
 ume] in      source  
 
 Serum or      data 
 
 Plasma     
 
 Creatinin  50 - 200  ML/MIN  Normal  No   Sep 2 
 
 e renal      informati  2017 6:25
 
 clearance     on in    AM
 
       source  
 
 predicted     data 
 
  by      
 
 Cockcroft     
 
 -Gault      
 
 formula     
 
 Estimated  59-  ML/MIN  No   REFERENCE  Sep 2 
 
      informati   RANGE:   2017 6:25
 
 glomerula    on in   >60    AM
 
 r     source   ML/MIN/1. 
 
 filtratio    data  73 SQUARE 
 
 n rate       METERSIf 
 
 (GF      this  
 
     patient  
 
     is  
 
     -A 
 
     merican,  
 
     then  
 
     multiply  
 
     theresult 
 
      by  
 
     1.210. 
 
 Glucose   74 - 106  mg/dL  Normal  No   Sep 2 
 
 [Mass/vol     informati  2017 6:25
 
 ume] in      on in    AM
 
 Serum or      source  
 
 Plasma     data 
 
 Potassium  3.5 - 5.1  mmoL/L  Normal  No   Sep 2 
 
       informati  2017 6:25
 
 [Moles/vo     on in    AM
 
 lume] in      source  
 
 Serum or      data 
 
 Plasma     
 
 Sodium   136 - 145  mmoL/L  Normal  No   Sep 2 
 
 [Moles/vo     informati  2017 6:25
 
 lume] in      on in    AM
 
 Serum or      source  
 
 Plasma     data 
 
 
 
 
 CBC W Auto Differential panel in Blood
 
 
 
 
 Observa  Value  Referen  Units  Interpr  Notes  Date
 
 tion   ce    etation  
 
   Range    
 
 
 
 
 Basophils  0 - 0.2  K/MM3  Normal  No   Sep 2 
 
       informati  2017 6:25
 
 [#/volume     on in    AM
 
 ] in      source  
 
 Blood by      data 
 
 Automated     
 
  count     
 
 Basophils  0.1 - 2.0  %  Normal  No   Sep 2 
 
 /100      informati  2017 6:25
 
 leukocyte     on in    AM
 
 s in      source  
 
 Blood by      data 
 
 Automated     
 
  count     
 
 Eosinophi  0.0 - 0.4  K/mm3  Normal  No   Sep 2 
 
 ls      informati  2017 6:25
 
 [#/volume     on in    AM
 
 ] in      source  
 
 Blood by      data 
 
 Automated     
 
  count     
 
 Eosinophi  0.1 -   %  Normal  No   Sep 2 
 
 ls/100   12.0    informati  2017 6:25
 
 leukocyte     on in    AM
 
 s in      source  
 
 Blood by      data 
 
 Automated     
 
  count     
 
 Granulocy  1.8 - 7.8  K/mm3  Normal  No   Sep 2 
 
 karen      informati  2017 6:25
 
 [#/volume     on in    AM
 
 ] in      source  
 
 Blood by      data 
 
 Automated     
 
  count     
 
 Granulocy  37.0 -   %  Normal  No   Sep 2 
 
 karen/100   80.0    informati  2017 6:25
 
 leukocyte     on in    AM
 
 s in      source  
 
 Blood by      data 
 
 Automated     
 
  count     
 
 Hematocri  37.0 -   %  Low  No   Sep 2 
 
 t [Volume  47.0    informati  2017 6:25
 
       on in    AM
 
 Fraction]     source  
 
  of Blood     data 
 
 Hemoglobi  12.2 -   g/dL  Low  No   Sep 2 
 
 n   16.2    informati  2017 6:25
 
 [Mass/vol     on in    AM
 
 ume] in      source  
 
 Blood     data 
 
 Lymphocyt  0.7 - 4.5  K/mm3  Normal  No   Sep 2 
 
 es      informati   6:25
 
 [#/volume     on in    AM
 
 ] in      source  
 
 Unspecifi     data 
 
 ed      
 
 specimen      
 
 by      
 
 Automated     
 
  count     
 
 Lymphocyt  10 - 50.0  %  Normal  No   Sep 2 
 
 es      informati  2017 6:25
 
 [#/volume     on in    AM
 
 ] in      source  
 
 Unspecifi     data 
 
 ed      
 
 specimen      
 
 by      
 
 Automated     
 
  count     
 
 Erythrocy  27 - 31.2  pg  Normal  No   Sep 2 
 
 te mean      informati  2017 6:25
 
 corpuscul     on in    AM
 
 ar      source  
 
 hemoglobi     data 
 
 n      
 
 [Entitic      
 
 mass]     
 
 Erythrocy  31.8 -   g/dl  Normal  No   Sep 2 
 
 te mean   35.4    informati  2017 6:25
 
 corpuscul     on in    AM
 
 ar      source  
 
 hemoglobi     data 
 
 n      
 
 concentra     
 
 tion      
 
 [Mass/vol     
 
 ume] by      
 
 Automated     
 
  count     
 
 Erythrocy  82.2 -   fl  Normal  No   Sep 2 
 
 te mean   97.8    informati  2017 6:25
 
 corpuscul     on in    AM
 
 ar volume     source  
 
  [Entitic     data 
 
  volume]      
 
 by      
 
 Automated     
 
  count     
 
 Monocytes  0.1 - 1.0  K/mm3  Normal  No   Sep 2 
 
       informati  2017 6:25
 
 [#/volume     on in    AM
 
 ] in      source  
 
 Blood by      data 
 
 Automated     
 
  count     
 
 Monocytes  1.7 - 9.3  %  High  No   Sep 2 
 
 /100      informati  2017 6:25
 
 leukocyte     on in    AM
 
 s in      source  
 
 Blood by      data 
 
 Automated     
 
  count     
 
 Platelet   7.4 -   fl  Normal  No   Sep 2 
 
 mean   10.4    informati  2017 6:25
 
 volume      on in    AM
 
 [Entitic      source  
 
 volume]      data 
 
 in Blood      
 
 by      
 
 Automated     
 
  count     
 
 Platelets  142 - 424  K/mm3  Normal  No   Sep 2 
 
       informati  2017 6:25
 
 [#/volume     on in    AM
 
 ] in      source  
 
 Blood     data 
 
 Erythrocy  4.2 - 5.4  M/mm3  Low  No   Sep 2 
 
 karen      informati  2017 6:25
 
 [#/volume     on in    AM
 
 ] in      source  
 
 Amniotic      data 
 
 fluid     
 
 Erythrocy  11.5 -   %  Normal  No   Sep 2 
 
 te   17.5    informati  2017 6:25
 
 distribut     on in    AM
 
 ion width     source  
 
  [Entitic     data 
 
  volume]      
 
 by      
 
 Automated     
 
  count     
 
 Leukocyte  4.8 -   K/MM3  No   No   Sep 2 
 
 s   10.8   informati  informati  2017 6:25
 
 [#/volume    on in   on in    AM
 
 ] in     source   source  
 
 Blood    data  data 
 
 
 
 
 CRP
 
 
 
 
 Observa  Value  Referen  Units  Interpr  Notes  Date
 
 tion   ce    etation  
 
   Range    
 
 
 
 
 CRP  0.0 - 0.9  MG/DL  High  No   Sep 1 
 
     informati  2017 6:40
 
     on in    PM
 
     source  
 
     data 
 
 
 
 
 Lactate [Moles/volume] in Blood
 
 
 
 
 Observa  Value  Referen  Units  Interpr  Notes  Date
 
 tion   ce    etation  
 
   Range    
 
 
 
 
 Lactate   0.4 - 2.0  mmol/L  Normal  No   Sep 1 
 
 [Moles/vo     informati  2017 6:40
 
 lume] in      on in    PM
 
 Blood     source  
 
     data 
 
 
 
 
 Comprehensive metabolic 2000 panel in Serum or Plasma
 
 
 
 
 Observa  Value  Referen  Units  Interpr  Notes  Date
 
 tion   ce    etation  
 
   Range    
 
 
 
 
 Albumin/G  1.1 - 1.8  No   Low  No   Sep 1 
 
 lobulin    informati   informati  2017 6:40
 
 [Mass    on in    on in    PM
 
 ratio] in   source    source  
 
  Serum or   data   data 
 
  Plasma     
 
 Albumin   3.4 - 5.0  gm/dL  Low  No   Sep 1 
 
 [Mass/vol     informati  2017 6:40
 
 ume] in      on in    PM
 
 Serum or      source  
 
 Plasma     data 
 
 Alkaline   46 - 116  U/L  High  No   Sep 1 
 
 phosphata     informati  2017 6:40
 
 se      on in    PM
 
 [Enzymati     source  
 
 c      data 
 
 activity/     
 
 volume]      
 
 in Serum      
 
 or Plasma     
 
 Bilirubin  0.2 - 1.0  mg/dL  Normal  No   Sep 1 
 
 .total      informati  2017 6:40
 
 [Mass/vol     on in    PM
 
 ume] in      source  
 
 Serum or      data 
 
 Plasma     
 
 Urea   7 - 18  mg/dL  Low  No   Sep 1 
 
 nitrogen      informati  2017 6:40
 
 [Mass/vol     on in    PM
 
 ume] in      source  
 
 Serum or      data 
 
 Plasma     
 
 Calcium   8.5 -   mg/dL  Normal  No   Sep 1 
 
 [Mass/vol  10.1    informati  2017 6:40
 
 ume] in      on in    PM
 
 Serum or      source  
 
 Plasma     data 
 
 Chloride   98 - 107  mmoL/L  Low  No   Sep 1 
 
 [Moles/vo     informati  2017 6:40
 
 lume] in      on in    PM
 
 Serum or      source  
 
 Plasma     data 
 
 Carbon   21.0 -   mmoL/L  Normal  No   Sep 1 
 
 dioxide,   32.0    informati  2017 6:40
 
 total      on in    PM
 
 [Moles/vo     source  
 
 lume] in      data 
 
 Serum or      
 
 Plasma     
 
 Creatinin  0.55 -   mg/dL  Normal  No   Sep 1 
 
 e   1.02    informati  2017 6:40
 
 [Mass/vol     on in    PM
 
 ume] in      source  
 
 Serum or      data 
 
 Plasma     
 
 Creatinin  50 - 200  ML/MIN  Normal  No   Sep 1 
 
 e renal      informati  2017 6:40
 
 clearance     on in    PM
 
       source  
 
 predicted     data 
 
  by      
 
 Cockcroft     
 
 -Gault      
 
 formula     
 
 Estimated  59-  ML/MIN  No   REFERENCE  Sep 1 
 
      informati   RANGE:   2017 6:40
 
 glomerula    on in   >60    PM
 
 r     source   ML/MIN/1. 
 
 filtratio    data  73 SQUARE 
 
 n rate       METERSIf 
 
 (GF      this  
 
     patient  
 
     is  
 
     -A 
 
     merican,  
 
     then  
 
     multiply  
 
     theresult 
 
      by  
 
     1.210. 
 
 Globulin   1.3 - 3.2  gm/dL  High  No   Sep 1 
 
 [Mass/vol     informati  2017 6:40
 
 ume] in      on in    PM
 
 Serum     source  
 
     data 
 
 Glucose   74 - 106  mg/dL  High  No   Sep 1 
 
 [Mass/vol     informati  2017 6:40
 
 ume] in      on in    PM
 
 Serum or      source  
 
 Plasma     data 
 
 Potassium  3.5 - 5.1  mmoL/L  Low  No   Sep 1 
 
       informati  2017 6:40
 
 [Moles/vo     on in    PM
 
 lume] in      source  
 
 Serum or      data 
 
 Plasma     
 
 Sodium   136 - 145  mmoL/L  Low  No   Sep 1 
 
 [Moles/vo     informati  2017 6:40
 
 lume] in      on in    PM
 
 Serum or      source  
 
 Plasma     data 
 
 Aspartate  15 - 37  U/L  Normal  No   Sep 1 
 
       informati  2017 6:40
 
 aminotran     on in    PM
 
 sferase      source  
 
 [Enzymati     data 
 
 c      
 
 activity/     
 
 volume]      
 
 in Serum      
 
 or Plasma     
 
 Alanine   12 - 78  U/L  Normal  No   Sep 1 
 
 aminotran     informati  2017 6:40
 
 sferase      on in    PM
 
 [Enzymati     source  
 
 c      data 
 
 activity/     
 
 volume]      
 
 in Serum      
 
 or Plasma     
 
 Protein   6.4 - 8.2  gm/dL  High  No   Sep 1 
 
 [Mass/vol     informati   6:40
 
 ume] in      on in    PM
 
 Serum or      source  
 
 Plasma     data 
 
 
 
 
 CBC W Auto Differential panel in Blood
 
 
 
 
 Observa  Value  Referen  Units  Interpr  Notes  Date
 
 tion   ce    etation  
 
   Range    
 
 
 
 
 Basophils  0 - 0.2  K/MM3  Normal  No   Sep 1 
 
       informati  2017 6:40
 
 [#/volume     on in    PM
 
 ] in      source  
 
 Blood by      data 
 
 Automated     
 
  count     
 
 Basophils  0.1 - 2.0  %  Normal  No   Sep 1 
 
 /100      informati  2017 6:40
 
 leukocyte     on in    PM
 
 s in      source  
 
 Blood by      data 
 
 Automated     
 
  count     
 
 Eosinophi  0.0 - 0.4  K/mm3  Normal  No   Sep 1 
 
 ls      informati  2017 6:40
 
 [#/volume     on in    PM
 
 ] in      source  
 
 Blood by      data 
 
 Automated     
 
  count     
 
 Eosinophi  0.1 -   %  Normal  No   Sep 1 
 
 ls/100   12.0    informati  2017 6:40
 
 leukocyte     on in    PM
 
 s in      source  
 
 Blood by      data 
 
 Automated     
 
  count     
 
 Granulocy  1.8 - 7.8  K/mm3  Normal  No   Sep 1 
 
 karen      informati  2017 6:40
 
 [#/volume     on in    PM
 
 ] in      source  
 
 Blood by      data 
 
 Automated     
 
  count     
 
 Granulocy  37.0 -   %  Normal  No   Sep 1 
 
 karen/100   80.0    informati  2017 6:40
 
 leukocyte     on in    PM
 
 s in      source  
 
 Blood by      data 
 
 Automated     
 
  count     
 
 Hematocri  37.0 -   %  Low  No   Sep 1 
 
 t [Volume  47.0    informati  2017 6:40
 
       on in    PM
 
 Fraction]     source  
 
  of Blood     data 
 
 Hemoglobi  12.2 -   g/dL  No   No   Sep 1 
 
 n   16.2   informati  informati  2017 6:40
 
 [Mass/vol    on in   on in    PM
 
 ume] in     source   source  
 
 Blood    data  data 
 
 Lymphocyt  0.7 - 4.5  K/mm3  Normal  No   Sep 1 
 
 es      informati  2017 6:40
 
 [#/volume     on in    PM
 
 ] in      source  
 
 Unspecifi     data 
 
 ed      
 
 specimen      
 
 by      
 
 Automated     
 
  count     
 
 Lymphocyt  10 - 50.0  %  Normal  No   Sep 1 
 
 es      informati  2017 6:40
 
 [#/volume     on in    PM
 
 ] in      source  
 
 Unspecifi     data 
 
 ed      
 
 specimen      
 
 by      
 
 Automated     
 
  count     
 
 Erythrocy  27 - 31.2  pg  Normal  No   Sep 1 
 
 te mean      informati  2017 6:40
 
 corpuscul     on in    PM
 
 ar      source  
 
 hemoglobi     data 
 
 n      
 
 [Entitic      
 
 mass]     
 
 Erythrocy  31.8 -   g/dl  Normal  No   Sep 1 
 
 te mean   35.4    informati  2017 6:40
 
 corpuscul     on in    PM
 
 ar      source  
 
 hemoglobi     data 
 
 n      
 
 concentra     
 
 tion      
 
 [Mass/vol     
 
 ume] by      
 
 Automated     
 
  count     
 
 Erythrocy  82.2 -   fl  Normal  No   Sep 1 
 
 te mean   97.8    informati  2017 6:40
 
 corpuscul     on in    PM
 
 ar volume     source  
 
  [Entitic     data 
 
  volume]      
 
 by      
 
 Automated     
 
  count     
 
 Monocytes  0.1 - 1.0  K/mm3  Normal  No   Sep 1 
 
       informati  2017 6:40
 
 [#/volume     on in    PM
 
 ] in      source  
 
 Blood by      data 
 
 Automated     
 
  count     
 
 Monocytes  1.7 - 9.3  %  Normal  No   Sep 1 
 
 /100      informati  2017 6:40
 
 leukocyte     on in    PM
 
 s in      source  
 
 Blood by      data 
 
 Automated     
 
  count     
 
 Platelet   7.4 -   fl  Low  No   Sep 1 
 
 mean   10.4    informati  2017 6:40
 
 volume      on in    PM
 
 [Entitic      source  
 
 volume]      data 
 
 in Blood      
 
 by      
 
 Automated     
 
  count     
 
 Platelets  142 - 424  K/mm3  Normal  No   Sep 1 
 
       informati  2017 6:40
 
 [#/volume     on in    PM
 
 ] in      source  
 
 Blood     data 
 
 Erythrocy  4.2 - 5.4  M/mm3  Low  No   Sep 1 
 
 karen      informati  2017 6:40
 
 [#/volume     on in    PM
 
 ] in      source  
 
 Amniotic      data 
 
 fluid     
 
 Erythrocy  11.5 -   %  Normal  No   Sep 1 
 
 te   17.5    informati  2017 6:40
 
 distribut     on in    PM
 
 ion width     source  
 
  [Entitic     data 
 
  volume]      
 
 by      
 
 Automated     
 
  count     
 
 Leukocyte  4.8 -   K/MM3  Normal  No   Sep 1 
 
 s   10.8    informati   6:40
 
 [#/volume     on in    PM
 
 ] in      source  
 
 Blood     data 
 
 
 
 
 HepC Qnt-ARUP
 
 
 
 
 Observa  Value  Referen  Units  Interpr  Notes  Date
 
 tion   ce    etation  
 
   Range    
 
 Hepatit  7.3  No   log_IU  No   INTERPR   
 
 is C    informa   informa  ETIVE   2017 
 
 RNA   tion in   tion in  INFORMA  3:50 PM
 
    source    source  TION:  
 
    data    data  Hepatit 
 
      is C  
 
      Virus  
 
      by  
 
      Quantit 
 
      ative  
 
      PCR\.br 
 
      \The  
 
      quantit 
 
      ative  
 
      range  
 
      of this 
 
       assay  
 
      is 1.2  
 
      - 8.0  
 
      log  
 
      IU/mL  
 
      (15-\.b 
 
      r\100,0 
 
      00,000  
 
      IU/mL). 
 
      \.br\Li 
 
      elaine of  
 
      detecti 
 
      on  
 
      (LOD):\ 
 
      .br\15  
 
      IU/mL  
 
      (1.2  
 
      log  
 
      IU/mL)\ 
 
      .br\LOD 
 
       values 
 
       do not 
 
       apply  
 
      to  
 
      diluted 
 
        
 
      specime 
 
      ns.\.br 
 
      \An  
 
      interpr 
 
      etation 
 
       of  
 
      "Not  
 
      Detecte 
 
      d" does 
 
       not  
 
      rule  
 
      out the 
 
        
 
      presenc 
 
      e\.br\o 
 
      f PCR  
 
      inhibit 
 
      ors in  
 
      the  
 
      patient 
 
        
 
      specime 
 
      n or  
 
      hepatit 
 
      is C  
 
      virus  
 
      RNA\.br 
 
      \concen 
 
      tration 
 
      s below 
 
       the  
 
      level  
 
      of  
 
      detecti 
 
      on of  
 
      the  
 
      test.  
 
      Care\.b 
 
      r\shoul 
 
      d be  
 
      taken  
 
      when  
 
      interpr 
 
      eting  
 
      any  
 
      single  
 
      viral  
 
      load\.b 
 
      r\deter 
 
      minatio 
 
      n.\.br\ 
 
      This  
 
      test  
 
      should  
 
      not be  
 
      used  
 
      for  
 
      blood  
 
      donor  
 
      screeni 
 
      ng,  
 
      associa 
 
      regi\.br 
 
      \re-ent 
 
      ry  
 
      protoco 
 
      ls, or  
 
      for  
 
      screeni 
 
      ng  
 
      Human  
 
      Cell,  
 
      Tissues 
 
        
 
      and\.br 
 
      \Cellul 
 
      ar  
 
      Tissue- 
 
      Based  
 
      Product 
 
      s  
 
      (HCT/P) 
 
      . 
 
 HCV   19,000,  No   IU/mL  No   No    
 
 IU-AR[facility]  000  informa   informa  informa  2017 
 
   tion in   tion in  tion in  3:50 PM
 
    source    source   source 
 
    data    data   data 
 
 HCV RNA  Detecte  Not   No   Abnorma  No    
 
    d  Detecte  informa  l  informa  2017 
 
 Interpr   d  tion in   tion in  3:50 PM
 
 etation     source    source 
 
     data    data 
 
 EER HCV  See   No   No   No   Access    
 
  RNA   Note  informa  informa  informa  ARUP   2017 
 
 Quant    tion in  tion in  tion in  Enhance  3:50 PM
 
 RT-PCR-    source   source   source  d  
 
 [facility]    data   data   data  Report  
 
      using  
 
      either  
 
      link  
 
      below:\ 
 
      .br\\.b 
 
      r\-Dire 
 
      ct  
 
      access: 
 
        
 
      https:/ 
 
      [email or web address] 
 
      [id number] 
 
      [id number] 
 
      [id number] 
 
      \.br\\. 
 
      br\-Ent 
 
      er  
 
      [place] 
 
      e,  
 
      Pass[employer] 
 
      d:  
 
      https:/ 
 
      [email or web address] 
 
      [employer]. 
 
      com\.br 
 
      \  
 
      [place] 
 
      e:  
 
      9Km?q-E 
 
      7\.br\  
 
      Passwor 
 
      d:  
 
      X!d3+c\ 
 
      .br\Per 
 
      formed  
 
      by EMERITA garcia,\ 
 
      .br\500 
 
        
 
      Yusef Cummins  
 
      [facility] [place]  
 
      [id number]  
 
      [phone]\. 
 
      br\www. 
 
      [employer] 
 
      .com,  
 
      Ramesh Blankenship MD -  
 
      Lab.  
 
      Direct[employer] 
 
      r 
 
 
 
 
 EK EKG 12 LEAD
 
 
 
 
 Observa  Value  Referen  Units  Interpr  Notes  Date
 
 tion   ce    etation  
 
   Range    
 
    Station  No   No   No   No    
 
  clara ECG  informa  informa  informa  informa  2017 
 
     tion in  tion in  tion in  tion in  5:20 PM
 
  Study\.   source   source   source   source 
 
  br\St.    data   data   data   data 
 
  Elizabe     
 
  th      
 
  [place]     
 
  h\.br\I     
 
  nterpre     
 
  tive      
 
  Stateme     
 
  nts\.br     
 
  \Sinus      
 
  bradyca     
 
  rdia\.b     
 
  r\Chrissie     
 
  l ECG      
 
  except      
 
  for      
 
  rate\.b     
 
  r\Elect     
 
  ronical     
 
  ly      
 
  Signed      
 
  On      
 
  7-15-20     
 
  17      
 
  11:30:5     
 
  4 EDT      
 
  by Price Whitman MD     
 
 
 
 
 [place]lamyd/GC TMA
 
 
 
 
 Observa  Value  Referen  Units  Interpr  Notes  Date
 
 tion   ce    etation  
 
   Range    
 
 [place]  Negativ  No   No   No   No    
 
 ia   e  informa  informa  informa  informa   
 
 trachom   tion in  tion in  tion in  tion in  12:45 
 
 atis    source   source   source   source  PM
 
    data   data   data   data 
 
 Neisser  Negativ  No   No   No   Testing   
 
 ia   e  informa  informa  informa      
 
 gonorrh   tion in  tion in  tion in  methodo  12:45 
 
 oeae    source   source   source  logy is  PM
 
    data   data   data    
 
      transcr 
 
      iption  
 
      mediate 
 
      d  
 
      amplifi 
 
      cation  
 
      (TMA)  
 
      using  
 
      the  
 
      Aptima  
 
      Combo 2 
 
       assay  
 
      from  
 
      [employer] 
 
      /[employer] 
 
      be.\.br 
 
      \A  
 
      negativ 
 
      e  
 
      result  
 
      does  
 
      not  
 
      complet 
 
      ely  
 
      rule  
 
      out a  
 
      [place] 
 
      ia  
 
      trachom 
 
      atis or 
 
        
 
      Neisser 
 
      ia  
 
      gonorrh 
 
      oeae  
 
      infecti 
 
      on due  
 
      to  
 
      potenti 
 
      al  
 
      inhibit 
 
      ors or  
 
      levels  
 
      present 
 
       below  
 
      the  
 
      limit  
 
      of  
 
      detecti 
 
      on by  
 
      this  
 
      assay.  
 
        
 
      Results 
 
       are  
 
      depende 
 
      nt on  
 
      proper  
 
      collect 
 
      ion and 
 
        
 
      transpo 
 
      rt of  
 
      specime 
 
      n. This 
 
       test  
 
      is  
 
      indicat 
 
      ed for  
 
      medical 
 
        
 
      purpose 
 
      s only  
 
      and  
 
      should  
 
      not be  
 
      used  
 
      for  
 
      legal  
 
      or  
 
      forensi 
 
      c  
 
      purpose 
 
      s.\.br\ 
 
      \.br\Th 
 
      e  
 
      perform 
 
      ance  
 
      charact 
 
      eristic 
 
      s of  
 
      this  
 
      test  
 
      were  
 
      validat 
 
      ed by  
 
      [facility] 
 
      are  
 
      laborat 
 
      ory.  
 
      This  
 
      assay  
 
      is FDA  
 
      cleared 
 
       to  
 
      test  
 
      the  
 
      followi 
 
      ng  
 
      specime 
 
      ns:  
 
      clinici 
 
      an-kelechi 
 
      ected  
 
      endocer 
 
      vical,  
 
      vaginal 
 
       and  
 
      male  
 
      urethra 
 
      l swab  
 
      specime 
 
      ns,  
 
      patient 
 
        
 
      collect 
 
      ed  
 
      vaginal 
 
        
 
      specime 
 
      ns  
 
      within  
 
      a  
 
      clinic  
 
      setting 
 
      , Thin  
 
      Prep  
 
      Specime 
 
      ns in  
 
      Preserv 
 
      Cyt  
 
      Solutio 
 
      n, and  
 
      first-s 
 
      tream,  
 
      unprese 
 
      rved  
 
      male  
 
      urine  
 
      specime 
 
      ns.  
 
      Testing 
 
       on  
 
      female  
 
      urine  
 
      is not  
 
      FDA  
 
      approve 
 
      d by  
 
      this  
 
      methodo 
 
      logy,  
 
      but has 
 
       been  
 
      develop 
 
      ed and  
 
      validat 
 
      ed by  
 
      the [facility] 
 
      are  
 
      laborat 
 
      ory.   
 
      Detaile 
 
      d  
 
      methodo 
 
      logy is 
 
        
 
      availab 
 
      le upon 
 
        
 
      request 
 
      . 
 
 
 
 
 Hep Prf-Ac
 
 
 
 
 Observa  Value  Referen  Units  Interpr  Notes  Date
 
 tion   ce    etation  
 
   [facility]    
 
 Hepatit  Negativ  Negativ  No   No   No    
 
 is B   e  e  informa  informa  informa  2017 
 
 virus     tion in  tion in  tion in  9:05 AM
 
 surface     source   source   source 
 
  Ag      data   data   data 
 
 [Presen      
 
 ce] in       
 
 Serum       
 
 by       
 
 Immunoa      
 
 ssay      
 
 Hep B   Negativ  Negativ  No   No   No    
 
 Core   e  e  informa  informa  informa  2017 
 
 IgM    tion in  tion in  tion in  9:05 AM
 
     source   source   source 
 
     data   data   data 
 
 Hepatit  Negativ  Negativ  No   No   No    
 
 is A   e  e  informa  informa  informa  2017 
 
 virus     tion in  tion in  tion in  9:04 AM
 
 Ab      source   source   source 
 
 [Units/     data   data   data 
 
 volume]      
 
  in       
 
 Serum       
 
 by       
 
 Radioim      
 
 munoass      
 
 ay       
 
 (MANNY)      
 
 Hep C   Positiv  Negativ  No   Abnorma  High    
 
 Ab  e  e  informa  l  signal/  2017 
 
    tion in   cutoff   9:21 AM
 
     source   ratio  
 
     data   (>= 5,  
 
      Archite 
 
      ct  
 
      Anti-HC 
 
      V). [facility] 
 
      are  
 
      Laborat 
 
      ory  
 
      recomme 
 
      nds  
 
      collect 
 
      ing a  
 
      new  
 
      specime 
 
      n and  
 
      testing 
 
       for  
 
      Hepatit 
 
      is C  
 
      RNA  
 
      Quantit 
 
      ative  
 
      PCR to  
 
      confirm 
 
        
 
      positiv 
 
      ity and 
 
        
 
      provide 
 
       a  
 
      baselin 
 
      e viral 
 
       load  
 
      for  
 
      monitor 
 
      ing  
 
      treatme 
 
      nt  
 
      efficac 
 
      y. 
 
 
 
 
 Mg
 
 
 
 
 Observa  Value  Referen  Units  Interpr  Notes  Date
 
 tion   ce    etation  
 
   [facility]    
 
 Magnesi  2.0  1.6 -   mg/dL  No   No    
 
 um    2.4   informa  informa   
 
 [Moles/     tion in  tion in  6:37 PM
 
 volume]      source   source 
 
  in       data   data 
 
 Serum       
 
 or       
 
 Plasma      
 
 
 
 
 TSH
 
 
 
 
 Observa  Value  Referen  Units  Interpr  Notes  Date
 
 tion   ce    etation  
 
   [facility]    
 
 Thyrotr  0.516  0.270 -  mcIU/mL  No   No    
 
 opin     4.200   informa  informa   
 
 [Units/     tion in  tion in  6:37 PM
 
 volume]      source   source 
 
  in       data   data 
 
 Serum       
 
 or       
 
 Plasma      
 
 
 
 
 GGT
 
 
 
 
 Observa  Value  Referen  Units  Interpr  Notes  Date
 
 tion   ce    etation  
 
   [facility]    
 
 Gamma   52  5 - 36  IU/L  High  No    
 
 glutamy      informa  2017 
 
 l       tion in  6:37 PM
 
 transfe       source 
 
 rase        data 
 
 [Enzyma      
 
 tic       
 
 activit      
 
 y/volum      
 
 e] in       
 
 Serum       
 
 or       
 
 Plasma      
 
 
 
 
 Hemogram
 
 
 
 
 Observa  Value  Referen  Units  Interpr  Notes  Date
 
 tion   ce    etation  
 
   [facility]    
 
 LEUKOCY  9.7  4.0 -   x10(3)/  No   No    
 
 KAREN   11.0  mcL  informa  informa  2017 
 
     tion in  tion in  3:55 PM
 
      source   source 
 
      data   data 
 
 Erythro  3.86  3.80 -   x10(6)/  No   No    
 
 cytes    5.10  mcL  informa  informa   
 
 [#/volu     tion in  tion in  3:55 PM
 
 me] in       source   source 
 
 Blood       data   data 
 
 by       
 
 Automat      
 
 ed       
 
 count      
 
 Hemoglo  11.7  12.0 -   gm/dL  Low  No    
 
 bin    15.6    informa   
 
 [Mass/v      tion in  3:55 PM
 
 olume]        source 
 
 in        data 
 
 Blood      
 
 Hematoc  35.9  35.7 -   %  No   No    
 
 rit    45.9   informa  informa   
 
 [Volume     tion in  tion in  3:55 PM
 
        source   source 
 
 Fractio      data   data 
 
 n] of       
 
 Blood       
 
 by       
 
 Automat      
 
 ed       
 
 count      
 
 Erythro  93.2  82.5 -   fL  No   No    
 
 cyte    99.8   informa  informa  2017 
 
 mean      tion in  tion in  3:55 PM
 
 corpusc      source   source 
 
 ular       data   data 
 
 volume       
 
 [Entiti      
 
 c       
 
 volume]      
 
  by       
 
 Automat      
 
 ed       
 
 count      
 
 Erythro  30.2  27.0 -   pg  No   No    
 
 cyte    34.3   informa  informa   
 
 mean      tion in  tion in  3:55 PM
 
 corpusc      source   source 
 
 ular       data   data 
 
 hemoglo      
 
 bin       
 
 [Entiti      
 
 c mass]      
 
  by       
 
 Automat      
 
 ed       
 
 count      
 
 Erythro  32.4  32.1 -   gm/dL  No   No    
 
 cyte    35.3   informa  informa  2017 
 
 mean      tion in  tion in  3:55 PM
 
 corpusc      source   source 
 
 ular       data   data 
 
 hemoglo      
 
 bin       
 
 concent      
 
 ration       
 
 [Mass/v      
 
 olume]       
 
 by       
 
 Automat      
 
 ed       
 
 count      
 
 Erythro  15.1  11.5 -   %  High  No    
 
 cyte    15.0    informa  2017 
 
 distrib      tion in  3:55 PM
 
 ution        source 
 
 width        data 
 
 [Ratio]      
 
  by       
 
 Automat      
 
 ed       
 
 count      
 
 Platele  234  144 -   x10(3)/  No   No    
 
 ts    423  mcL  informa  informa  2017 
 
 [#/volu     tion in  tion in  3:55 PM
 
 me] in       source   source 
 
 Blood       data   data 
 
 by       
 
 Automat      
 
 ed       
 
 count      
 
 MPV  8.3  6.8 -   fL  No   No    
 
   10.8   informa  informa  2017 
 
     tion in  tion in  3:55 PM
 
      source   source 
 
      data   data 
 
 
 
 
 UA
 
 
 
 
 Observa  Value  Referen  Units  Interpr  Notes  Date
 
 tion   ce    etation  
 
   [facility]    
 
 UA   Dorinda  No   No   No   No    
 
 Color   informa  informa  informa  informa  2017 
 
   tion in  tion in  tion in  tion in  8:39 PM
 
    source   source   source   source 
 
    data   data   data   data 
 
 UA   Cloudy  Clear  No   Abnorma  No    
 
 Appear    informa  l  informa  2017 
 
    tion in   tion in  8:39 PM
 
     source    source 
 
     data    data 
 
 UA   Negativ  Negativ  No   No   No    
 
 Glucose  e  e  informa  informa  informa  2017 
 
    tion in  tion in  tion in  8:39 PM
 
     source   source   source 
 
     data   data   data 
 
 UA   Negativ  Negativ  No   No   No    
 
 Ketones  e  e  informa  informa  informa   
 
    tion in  tion in  tion in  8:39 PM
 
     source   source   source 
 
     data   data   data 
 
 UA   Negativ  Negativ  No   No   No    
 
 Blood  e  e  informa  informa  informa  2017 
 
    tion in  tion in  tion in  8:39 PM
 
     source   source   source 
 
     data   data   data 
 
 UA pH  5.0  4.8 -   No   No   Referen   
 
   8.0  informa  informa  ce   2017 
 
    tion in  tion in  range   8:39 PM
 
     source   source  valid  
 
     data   data  for  
 
      random  
 
      specime 
 
      ns  
 
      only. 
 
 UA   100   Negativ  No   Abnorma  No    
 
 Protein  mg/dl  e  informa  l  informa   
 
    tion in   tion in  8:39 PM
 
     source    source 
 
     data    data 
 
 UA   2 mg/dl  <=1   No   Abnorma  No    
 
 Urobili   mg/dl  informa  l  informa  2017 
 
 nogen    tion in   tion in  8:39 PM
 
     source    source 
 
     data    data 
 
 UA   Negativ  Negativ  No   No   No    
 
 Nitrite  e  e  informa  informa  informa  2017 
 
    tion in  tion in  tion in  8:39 PM
 
     source   source   source 
 
     data   data   data 
 
 UA Leuk  Large  Negativ  No   Abnorma  No    
 
  Est   e  informa  l  informa  2017 
 
    tion in   tion in  8:39 PM
 
     source    source 
 
     data    data 
 
 UA Spec  1.028  1.001 -  No   No   Referen   
 
  Grav    1.035  informa  informa  ce   2017 
 
    tion in  tion in  range   8:39 PM
 
     source   source  valid  
 
     data   data  for  
 
      random  
 
      specime 
 
      ns  
 
      only. 
 
 UA WBC  39  0 - 4  /HPF  High  No    
 
      informa  2017 
 
      tion in  8:39 PM
 
       source 
 
       data 
 
 UA RBC  4  0 - 3  /HPF  High  No    
 
      informa  2017 
 
      tion in  8:39 PM
 
       source 
 
       data 
 
 UA   4+  No   No   No   No    
 
 Squam    informa  informa  informa  informa  2017 
 
 Epi   tion in  tion in  tion in  tion in  8:39 PM
 
    source   source   source   source 
 
    data   data   data   data 
 
 UA   3+  No   No   No   No    
 
 Mucous   informa  informa  informa  informa  2017 
 
   tion in  tion in  tion in  tion in  8:39 PM
 
    source   source   source   source 
 
    data   data   data   data 
 
 UA   Trace  No   No   Abnorma  No    
 
 Bacteri   informa  informa  l  informa  2017 
 
 a   tion in  tion in   tion in  8:39 PM
 
    source   source    source 
 
    data   data    data 
 
 UA CA   Few  No   No   No   No    
 
 Ox Flor   informa  informa  informa  informa  2017 
 
   tion in  tion in  tion in  tion in  8:39 PM
 
    source   source   source   source 
 
    data   data   data   data

## 2017-10-07 NOTE — EXTERNAL MEDICAL SUMMARY RPT
Patient Summary
 Created on: 10/05/2017
 
BERYL MANN
External Reference #: [id number]
: [id number]
Sex: Female
 
Demographics
 
 
 
 Address  [address], [place] [address]
 
 Home Phone  [phone]
 
 Preferred Language  Unknown
 
 Marital Status  Unknown
 
 Islam Affiliation  Unknown
 
 Race  White
 
 Ethnic Group  Unknown
 
 
Author
 
 
 
 Author            ,            SAMIR DAWSON
 
 Address  Unknown
 
 Phone  [email or web address]
 
 
 
Support
 
 
 
 Name  Relationship  Address  Phone
 
 MACKENZIE,            Next Of Kin  [address]   +1 
 
   DANIEL CLAIREECDEENA,   [phone]
 
   [place]  [address] 
 
 
 
Care Team Providers
 
 
 
 Care Team Member Name  Role  Phone
 
 VINICIO COOK,   Unavailable  Unavailable
 
 VINICIO COOK   
 
 ELLIS ALL, ELLIS ALL   Unavailable  Unavailable
 
 [facility]   Un[facility]  Unavailable
 
 [facility]   
 
 RAJESH PAMELA,   Unavailable  Unavailable
 
 RAJESH PAMELA   
 
 RAJESH PAMELA,   Unavailable  Unavailable
 
 RAJESH PAMELA   
 
 RAJESH, REYNA,   Unavailable  Unavailable
 
 RAJESH, REYNA   
 
 [facility]  Unavailable  Unavailable
 
  [facility]  
 
    
 
 [facility]   Unavailable  Unavailable
 
 [place]   
 
 [facility] [place]  
 
    
 
 [facility]   Unavailable  Unavailable
 
 [place] [facility]  
 
  [facility] [place]  
 
    
 
 [facility]   Unavailable  Unavailable
 
 HEATHER ABNER, HEATHER   Unavailable  Unavailable
 
 ABNER   
 
 PITO ROMERO S,   Unavailable  Unavailable
 
 PITO ROMERO S   
 
 SHAHLA CRUZ,   Unavailable  Unavailable
 
 SHAHLA CRUZ   
 
 [facility]   Unavailable  Unavailable
 
 [facility]   
 
 [facility]   
 
 DANIEL MULLEN,   Unavailable  Unavailable
 
 DANIEL MULLEN   
 
 [facility],  Unavailable  Unavailable
 
  [facility]  
 
    
 
 [facility]   Unavailable  Unavailable
 
 [facility], [facility]   
 
 VÍCTOR GRAVES   Unavailable  Unavailable
 
 VÍCTOR GRAVES   Unavailable  Unavailable
 
 TASH THOMAS,   Unavailable  Unavailable
 
 TASH THOMAS EMMETT P,   Unavailable  Unavailable
 
 SHELLY DUQUE   
 
 [facility]   Unavailable  Unavailable
 
 [facility]   
 
 [facility]   
 
 [facility],   Unavailable  Unavailable
 
 [facility]   
 
 RENUSCH, RENUSCH   Unavailable  Unavailable
 
 GENA ARNOLD,   Unavailable  Unavailable
 
 GENA ARNOLD MD,   Unavailable  Unavailable
 
 ÁLVARO Yanez MD,   Unavailable  Unavailable
 
 ÁLVARO CORDERO A C, WRIGHT,   Unavailable  Unavailable
 
 ASA C   
 
                                            
 
Purpose
                      Continuity of Care Document - 2009 through 10-05-
2017                                                                            
                     
 
Problems
                      
 
 
 Code         Diagnosis    DOS          Provider     Status     
 
                                                               
 
               
 
 [id number]       CUTANEOUS   2017   [facility]              
 
              ABSCESS OF                PHYSICIANS,             
 
      RIGHT UPPER           PLLC                   
 
   LIMB                       
 
                  
 
      
 
 D649         ANEMIA   2015   [facility]              
 
              UNSPECIFIED               PHYSICIANS,             
 
                            PLLC                   
 
                          
 
      
 
 I10          ESSENTIAL   2015   [facility]              
 
              PRIMARY                [facility]              
 
      HYPERTENSIO          INC                     
 
  N                          
 
             
 
 [id number]       CELLULITIS   2015   [facility]              
 
              OF RIGHT                PH[facility]ICIANS,             
 
      UPPER LIMB            PLLC                   
 
                              
 
             
 
 R945         ABNORMAL   2015   [facility]              
 
              RESULTS OF                [facility]              
 
      LIVER           INC                     
 
  FUNCTION                 
 
  STUDIES       
 
                
 
        
 
 Z720         TOBACCO USE  2015   [facility]              
 
                                        [facility]              
 
                       INC                     
 
                 
 
 [id number]       UNSPECIFIED  10-   [facility]              
 
               ASTHMA                [facility]              
 
      UNCOMPLICAT          INC                     
 
  ED                         
 
              
 
 10121        ASTHMA,   2015   [facility]              
 
              UNSPECIFIED               PHYSICIANS              
 
      ,           GROUP                   
 
  UNSPECIFIED                 
 
   STATUS         
 
                
 
        
 
 [id number]        OTHER   2015   [facility]              
 
              MALAISE AND               PHYSICIANS              
 
       FATIGUE             GROUP                   
 
                              
 
           
 
 7905         OTHER   2015   [facility]              
 
              NONSPECIFIC               PHYSICIANS              
 
       ABNORMAL           GROUP                   
 
  SERUM                  
 
  ENZYME      
 
  LEVELS        
 
                
 
       
 
 35920        DEGEN   2015   [place]              
 
              LUMBAR/LUMB               MEDICAL              
 
      OSACRAL           IMAGING ASS             
 
  INTERVERTEB                 
 
  RAL DISC              
 
                
 
         
 
 7245         UNSPECIFIED  2015   [place]              
 
               BACKACHE                 MEDICAL              
 
                           IMAGING ASS             
 
                        
 
            
 
 7804         DIZZINESS   2015   [place]              
 
              AND                MEDICAL              
 
      GIDDINESS            IMAGING ASS             
 
                              
 
                  
 
 82775        SHORTNESS   2015   [place]              
 
              OF BREATH                 MEDICAL              
 
                           IMAGING ASS             
 
                        
 
            
 
 2859         UNSPECIFIED  2014   [facility]                
 
               ANEMIA                                           
 
                                          
 
          
 
 6202         OTHER AND   2014   [facility]              
 
              UNSPECIFIED               [facility]ELA                     
 
     OVARIAN                                  
 
  CYST            
 
                
 
 3384         CHRONIC   2010   JOYCELYN COOK                VINICIO W               
 
    SYNDROME                                     
 
                        
 
         
 
 V154         PERS HX   2010   [facility]PT FOR              
 
              PSYCHOLOGIC               [facility]             
 
    AL TRAUMA                                   
 
  PRS HAZARDS             
 
   HEALTH       
 
                
 
        
 
 37306        REGULAR   2010   [facility]              
 
              ASTIGMATISM               VISION                  
 
                                                 
 
                 
 
 13065        UNSPECIFIED  2010   [facility]              
 
                              SERVICES              
 
    ARTHROPATHY          PSC                     
 
   OTHER                 
 
  SPECIFIED    
 
  SITES         
 
                
 
      
 
 07574        DISPLCMT   2010   [facility]              
 
              LUMBAR                SERVICES              
 
    INTERVERT           PSC                     
 
  DISC W/O                 
 
  MYELOPATHY    
 
                
 
           
 
 7242         LUMBAGO      2010   [facility]              
 
                                        SERVICES              
 
                 PSC                     
 
                 
 
 47131        SPINAL STEN  2010   [facility]              
 
               LUMB REG                [facility]              
 
    W/O           INC                     
 
  NEUROGENIC                 
 
  CLAUDICATIO   
 
  N             
 
             
 
 V571         OTHER   2010   [facility]              
 
              PHYSICAL                [facility]              
 
    THERAPY              INC                     
 
                             
 
        
 
 40222        CONTUSION   2010   [facility]              
 
              OF BACK                   [facility]              
 
                         INC                     
 
                     
 
 8472         LUMBAR   2010   [place]              
 
              SPR[facility] AND                EMERGENCY              
 
    STRAIN               SERVICES              
 
                ASSOCIATES    
 
                   
 
           
 
         PLACE OF   2010   [place]              
 
              OCCURRENCE,               MEDICAL              
 
     HOME                IMAGING              
 
                ASSOCIATES    
 
                  
 
           
 
         FALL FROM   2010   [place]              
 
              OTHER                MEDICAL              
 
    SLIPPING           IMAGING              
 
  TRIPPING OR   ASSOCIATES    
 
   [facility]                
 
                       
 
           
 
 4660         ACUTE   10-   [place],              
 
              BRONCHITIS                VINICIO W               
 
                                                 
 
                   
 
 5276         MUCOCELE OF  10-   [facility]              
 
               SALIVARY                PATHOLOGY              
 
    GLAND                LABORATORY              
 
                              
 
             
 
 12345        DENTAL   2009   [facility],              
 
              CARIES                DANIEL COOPER                
 
    EXTENDING                                   
 
  INTO PULP            
 
                
 
          
 
 5220         PULPITIS     2009   DANI[facility] MULLEN                
 
                                          
 
           
 
 64854        TOOTH   2009   [facility],              
 
              BROKEN FX                DANIEL COOPER                
 
    DUE TO                                   
 
  TRAUMA W/O           
 
  MENTION    
 
  COMP          
 
                
 
 [id number]    Cannabis                [place]              
 
              type drug                [facility]                
 
                                  
 
                
 
 [id number]     Narcotic                [place]              
 
              drug user                 [facility]                
 
                            
 
         
 
 780.2        Syncope                   [facility]                
 
                      
 
         
 
 D64.9        ANEMIA,                                        
 
              UNSPECIFIED                                       
 
                                                      
 
                      
 
 F11.20       Opioid                                        
 
              dependence,                                       
 
                                             
 
  uncomplicat             
 
  ed            
 
              
 
 F12.20       Cannabis                                        
 
              dependence,                                       
 
                                             
 
  uncomplicat             
 
  ed            
 
              
 
 F17.200      NICOTINE                                        
 
              DEPENDENCE,                                       
 
                                              
 
  UNSPECIFIED             
 
  ,    
 
  UNCOMPLICAT   
 
  ED            
 
              
 
 F19.10       OTHER                                        
 
              PSYCHOACTIV                                       
 
     E SUBSTANCE                                       
 
   ABUSE,              
 
  UNCOMPLICAT   
 
  ED            
 
              
 
 F19.20       OTHER                                        
 
              PSYCHOACTIV                                       
 
     E SUBSTANCE                                       
 
                
 
  DEPENDENCE,   
 
      
 
  UNCOMPLICAT   
 
  ED            
 
              
 
 F19.90       OTHER                                        
 
              PSYCHOACTIV                                       
 
     E SUBSTANCE                                       
 
   USE,              
 
  UNSPECIFIED   
 
  ,    
 
  UNCOMPLICAT   
 
  ED            
 
              
 
 L02.91       CUTANEOUS                                        
 
              ABSCESS,                                        
 
     UNSPECIFIED                                       
 
                          
 
            
 
 L03.90       CELLULITIS,                                       
 
                                                      
 
     UNSPECIFIED                                       
 
                          
 
            
 
 R10.9        UNSPECIFIED                                       
 
               ABDOMINAL                                        
 
    PAIN                                              
 
                          
 
 R79.89       OTHER                                        
 
              SPECIFIED                                        
 
     ABNORMAL                                        
 
  FINDINGS OF             
 
   BLOOD    
 
  CHEMISTRY     
 
                
 
          
 
                                                                                
                                                                                
                                                                                
                                                                                
                                                                                
                                                                                
                                                                        
 
Allergies, Adverse Reactions, Alerts
                      
 
 
 Type                
 
 Drug Allergy                
 
            Adverse Reaction to Substance            
 
 
 Substance              Reaction               Severity             
 
                   
 
 Naproxen               I-RASH                 Intermediate         
 
                    
 
 Codeine                I-RASH                 Intermediate         
 
                   
 
                                                                                
                           
 
Medications
                      
 
 
 Na  ND  Rx  Da  Fi  Fi  Am  Da  Di  Ph  RX  Ph  St
 
 me  C   No  te  ll  ll  ou  ys  ag  ar   #  ys  at
 
         rm        s   nt      no  ma      ic  us
 
             Or  Da              si  cy      ia    
 
             de  te              s           n     
 
             re                                    
 
             d                                     
 
                                                   
 
                                                   
 
                                                   
 
                                                  
 
                                   
 
                           
 
                      
 
               
 
              
 
 [place]  68      09  09      30  8           00  [place]  Ac
 
 OM  38      -0  -2      .0              00  L-  ti
 
 ET  20      4  9-      00              07  MA  ve
 
 HA  04      20  20                      50  RT    
 
 ZI  10      17  17                      75       
 
 NE  1                                   29  PH    
 
                                            AR    
 
 25                                          MA    
 
                                            CY    
 
 MG                                             
 
                                      #5    
 
 TA                                    91 
 
 BL                                    
 
 ET                                    
 
                                
 
                           
 
                           
 
                           
 
                  
 
                  
 
                  
 
               
 
               
 
 HY  00      09  09      10  3           00  [place]  Ac
 
 DR  40      -0  -2      .0              00  L-  ti
 
 OC  60      4-  9-      00              02  MA  ve
 
 OD  12      20  20                      24  RT    
 
 ON  50      17  17                      17       
 
 -A  1                                   72  PH    
 
 CE                                          AR    
 
 TA                                          MA    
 
 MI                                          CY    
 
 NO                                             
 
 PH                                    #5    
 
 N                                     91 
 
 10                                    
 
 -3                                    
 
 25                             
 
                           
 
                           
 
                           
 
                  
 
                  
 
                  
 
               
 
               
 
               
 
 JAIME  53      09  09      20  10          00  [place]  Ac
 
 LF  74      -0  -2      .0              00  L-  ti
 
 AM  60      4-  9-      00              07  MA  ve
 
 ET  27      20  20                      50  RT    
 
 HO  20      17  17                      75       
 
 XA  5                                   30  PH    
 
 ZO                                          AR    
 
 LE                                          MA    
 
 -T                                          CY    
 
 MP                                              
 
                                      #5    
 
 DS                                    91 
 
                                      
 
 TA                                    
 
 BL                             
 
 ET                        
 
                           
 
                           
 
                  
 
                  
 
                  
 
               
 
               
 
               
 
               
 
 TR  57      09  09      6.  3           00  HO  Ac
 
 AM  66      -0  -2      00              00  ME  ti
 
 AD  40      6-  9-      0               04  TO  ve
 
 OL  37      20  20                      02  WN    
 
    71      17  17                      44       
 
 HC  8                                   70  PH    
 
 L                                           AR    
 
 50                                          MA    
 
                                            CY    
 
 MG                                             
 
                                      OF    
 
 TA                                     
 
 BL                                 CY 
 
 ET                                 NT 
 
                             HI 
 
                        AN 
 
                        A  
 
                           
 
                  
 
                  
 
                  
 
                  
 
                  
 
               
 
               
 
              
 
 CL  00      08  09      40  10          00  HO  Ac
 
 IN  59      -2  -2      .0              00  ME  ti
 
 DA  12      9-  2-      00              06  TO  ve
 
 MY  93      20  20                      09  WN    
 
 CI  20      17  17                      32       
 
 N   1                                   77  PH    
 
 HC                                          AR    
 
 L                                           MA    
 
 30                                          CY    
 
 0                                               
 
 MG                                    OF    
 
                                        
 
 CA                                 CY 
 
 PS                                 NT 
 
 UL                          HI 
 
 E                      AN 
 
                        A  
 
                           
 
                  
 
                  
 
                  
 
                  
 
                  
 
                  
 
                 
 
              
 
 OX  53      08  09      10  2           00  HO  Ac
 
 YC  74      -2  -2      .0              00  ME  ti
 
 OD  60      9-  2-      00              02  TO  ve
 
 ON  20      20  20                      01  WN    
 
 E-  30      17  17                      44       
 
 AC  5                                   93  PH    
 
 ET                                          AR    
 
 AM                                          MA    
 
 IN                                          CY    
 
 OP                                             
 
 HE                                    OF    
 
 N                                       
 
 5-                                 CY 
 
 32                                 NT 
 
 5                           HI 
 
                        AN 
 
                        A  
 
                           
 
                  
 
                  
 
                  
 
                  
 
                  
 
                 
 
               
 
              
 
 SO  00          07          0                   No
 
 DI  40          -0                               
 
 UM  97          3-                              Lo
 
    98          20                              ng
 
 CH  30          13                              er
 
 LO  9                                            
 
 RI                                              Ac
 
 DE                                              ti
 
                                                ve
 
 0.                                              
 
 9%                                          
 
                                            
 
 SO                                          
 
 NOMAN                                          
 
 TI                                      
 
 ON                                    
 
                                       
 
                                    
 
               
 
               
 
               
 
               
 
               
 
               
 
               
 
 Sa  63          07          0                   No
 
 li  80          -0                               
 
 ne  70          3-                              Lo
 
    10          20                              ng
 
 Fl  07          13                              er
 
 us  5                                            
 
 h                                               Ac
 
 10                                              ti
 
 ML                                              ve
 
                                                
 
 Sy                                          
 
 ri                                          
 
 ng                                          
 
 e                                           
 
                                         
 
                                       
 
                                       
 
                                    
 
               
 
               
 
               
 
               
 
              
 
 AL  00      03  05  5   90  30      EA  16  AR  Ac
 
 [place]  78      -1  -1      .0          ST  82  NO  ti
 
 [place]  11      7-  7-      00          SI  20  LD  ve
 
 OL  08      20  20                  DE           
 
 AM  90      10  10                         RI    
 
  2  5                               PH      CH    
 
                                    AR      AR    
 
 MG                                  MA      D     
 
                                    CY      W     
 
 TA                                             
 
 BL                         OF            
 
 ET                                   
 
                         CY      
 
                         NT      
 
                  HI      
 
                AN      
 
                A       
 
                       
 
                  
 
                  
 
                  
 
               
 
               
 
               
 
               
 
              
 
     00      03  05  1   15  25      EA  16  GI  Ac
 
     59      -2  -1      0.          ST  98  LB  ti
 
     10      9-  7-      00          SI  21  ER  ve
 
     38      20  20      0           DE      T     
 
     50      10  10                         [place]    
 
     1                               PH      HN    
 
                                     AR       W    
 
                                     MA            
 
                                     CY            
 
                                                
 
                            OF            
 
                                      
 
                           CY      
 
                         NT      
 
                  HI      
 
                AN      
 
                A    
 
                     
 
               
 
               
 
               
 
               
 
               
 
               
 
               
 
              
 
 AL  00      03  04  5   90  30      EA  16  AR  Ac
 
 [place]  78      -1  -1      .0          ST  82  NO  ti
 
 [place]  11      7-  7-      00          SI  20  LD  ve
 
 OL  08      20  20                  DE           
 
 AM  90      10  10                         RI    
 
  2  5                               PH      CH    
 
                                    AR      AR    
 
 MG                                  MA      D     
 
                                    CY      W     
 
 TA                                             
 
 BL                         OF            
 
 ET                                   
 
                         CY      
 
                         NT      
 
                  HI      
 
                AN      
 
                A       
 
                       
 
                  
 
                  
 
                  
 
               
 
               
 
               
 
               
 
              
 
     59      10  04  11  8.  20      EA  14  AR  Ac
 
     31      -2  -1      50          ST  82  NO  ti
 
     00      3-  6-      0           SI  09  LD  ve
 
     57      20  20                  DE           
 
     92      09  10                         RI    
 
     0                               PH      CH    
 
                                     AR      AR    
 
                                     MA      D     
 
                                     CY      W     
 
                                                 
 
                            OF            
 
                                     
 
                         CY      
 
                         NT      
 
                  HI      
 
                AN      
 
                A       
 
                       
 
               
 
               
 
               
 
               
 
               
 
               
 
               
 
              
 
 TI  00      03  03  1   90  30      EA  16  GI  Ac
 
 ZA  18      -3  -3      .0          ST  99  LB  ti
 
 NI  54      0-  0-      00          SI  57  ER  ve
 
 DI  40      20  20                  DE      T     
 
 NE  02      10  10                         [place]    
 
    3                               PH      HN    
 
 HC                                  AR       W    
 
 L                                   MA            
 
 4                                   CY            
 
 MG                                             
 
                           OF            
 
 TA                                   
 
 BL                      CY      
 
 ET                      NT      
 
                  HI      
 
                AN      
 
                A    
 
                     
 
                  
 
                  
 
                  
 
                  
 
                  
 
               
 
               
 
              
 
     00      03  03  1   15  25      EA  16  GI  Ac
 
     59      -2  -2      0.          ST  98  LB  ti
 
     10      9-  9-      00          SI  21  ER  ve
 
     38      20  20      0           DE      T     
 
     50      10  10                         [place]    
 
     1                               PH      HN    
 
                                     AR       W    
 
                                     MA            
 
                                     CY            
 
                                                
 
                            OF            
 
                                      
 
                           CY      
 
                         NT      
 
                  HI      
 
                AN      
 
                A    
 
                     
 
               
 
               
 
               
 
               
 
               
 
               
 
               
 
              
 
 AL  00      03  03  5   90  30      EA  16  AR  Ac
 
 [place]  78      -1  -1      .0          ST  82  NO  ti
 
 [place]  11      7-  7-      00          SI  20  LD  ve
 
 OL  08      20  20                  DE           
 
 AM  90      10  10                         RI    
 
  2  5                               PH      CH    
 
                                    AR      AR    
 
 MG                                  MA      D     
 
                                    CY      W     
 
 TA                                             
 
 BL                         OF            
 
 ET                                   
 
                         CY      
 
                         NT      
 
                  HI      
 
                AN      
 
                A       
 
                       
 
                  
 
                  
 
                  
 
               
 
               
 
               
 
               
 
              
 
 TI  00      03  03  0   60  30      EA  16  MI  Ac
 
 ZA  18      -0  -0      .0          ST  57  CK  ti
 
 NI  54      1-  1-      00          SI  54     ve
 
 DI  40      20  20                  DE      GR    
 
 NE  02      10  10                         EG    
 
    3                               PH      OR    
 
 HC                                  AR      Y     
 
 L                                   MA      E     
 
 4                                   CY            
 
 MG                                             
 
                           OF            
 
 TA                                   
 
 BL                      CY      
 
 ET                      NT      
 
                  HI      
 
                AN      
 
                A      
 
                     
 
                  
 
                  
 
                  
 
                  
 
                  
 
               
 
               
 
              
 
 LI  63      03  03  5   60  30      EA  16  MI  Ac
 
 DO  48      -0  -0      .0          ST  57  CK  ti
 
 DE  10      1-  1-      00          SI  56     ve
 
 RM  68      20  20                  DE      GR    
 
    70      10  10                         EG    
 
 5%  6                               PH      OR    
 
                                    AR      Y     
 
 PA                                  MA      E     
 
 TC                                  CY            
 
 H                                              
 
                            OF            
 
                                      
 
                         CY      
 
                         NT      
 
                  HI      
 
                AN      
 
                A      
 
                     
 
                 
 
               
 
               
 
               
 
               
 
               
 
               
 
              
 
 AL  67      02  02  00  90  30      EA  16  AR  Ac
 
 [place]  25      -1  -2      .0          ST  43  NO  ti
 
 [place]  30      9-  6-      00          SI  25  LD  ve
 
 OL  90      20  20                  DE           
 
 AM  11      10  10                         RI    
 
    1                               PH      CH    
 
 0.                                  AR      AR    
 
 5                                   MA      D     
 
 MG                                  CY      W     
 
                                                
 
 TA                         OF            
 
 BL                         CY         
 
 ET                      NT      
 
                         HI      
 
                  AN      
 
                A       
 
                        
 
                       
 
                  
 
                  
 
                  
 
                  
 
               
 
               
 
              
 
     59      10  12  01  8.  15      EA  14  AR  Ac
 
     31      -2  -0      50          ST  82  NO  ti
 
     00      3-  3-      0           SI  09  LD  ve
 
     57      20  20                  DE           
 
     92      09  09                         RI    
 
     0                               PH      CH    
 
                                     AR      AR    
 
                                     MA      D     
 
                                     CY      W     
 
                                                 
 
                            OF            
 
                           CY         
 
                         NT      
 
                         HI      
 
                  AN      
 
                A       
 
                        
 
                       
 
               
 
               
 
               
 
               
 
               
 
               
 
              
 
     59      10  11  00  8.  15      EA  14  AR  Ac
 
     31      -2  -0      50          ST  82  NO  ti
 
     00      3-  5-      0           SI  09  LD  ve
 
     57      20  20                  DE           
 
     92      09  09                         RI    
 
     0                               PH      CH    
 
                                     AR      AR    
 
                                     MA      D     
 
                                     CY      W     
 
                                                 
 
                            OF            
 
                           CY         
 
                         NT      
 
                         HI      
 
                  AN      
 
                A       
 
                        
 
                       
 
               
 
               
 
               
 
               
 
               
 
               
 
              
 
 [place]  00      10  10  00  6.  6       EA  14  AR  Ac
 
 IT  09      -0  -2      00          ST  56  NO  ti
 
 HR  37      5-  2-      0           SI  82  LD  ve
 
 OM  14      20  20                  DE           
 
 YC  61      09  09                         RI    
 
 IN  8                               PH      CH    
 
                                    AR      AR    
 
 25                                  MA      D     
 
 0                                   CY      W     
 
 MG                                             
 
                           OF            
 
 TA                        CY         
 
 BL                      NT      
 
 ET                      HI      
 
                  AN      
 
                A       
 
                        
 
                       
 
                  
 
                  
 
                  
 
                  
 
                  
 
               
 
              
 
     00      10  10  00  60  20      EA  14  AR  Ac
 
     25      -0  -2      .0          ST  56  NO  ti
 
     83      5-  2-      00          SI  81  LD  ve
 
     67      20  20                  DE           
 
     80      09  09                         RI    
 
     1                               PH      CH    
 
                                     AR      AR    
 
                                     MA      D     
 
                                     CY      W     
 
                                                 
 
                            OF            
 
                            CY         
 
                         NT      
 
                         HI      
 
                  AN      
 
                A       
 
                        
 
                       
 
               
 
               
 
               
 
               
 
               
 
               
 
              
 
 IN  00      07  10  02  60  20      EA  13  MO  Ac
 
 DO  78      -1  -2      .0          ST  52  SE  ti
 
 ME  12      7-  2-      00          SI  24  S   ve
 
 TH  32      20  20                  DE      ST    
 
 AC  51      09  09                         EP    
 
 IN  0                               PH      HE    
 
                                    AR      N     
 
 25                                  MA      A     
 
                                    CY            
 
 MG                                              
 
                           OF            
 
 CA                         CY         
 
 PS                      NT      
 
 UL                      HI      
 
 E                AN      
 
                A       
 
                       
 
                     
 
                  
 
                  
 
                  
 
                  
 
                  
 
                 
 
              
 
 AM  00      09  10  00  15  5       EA  14  HE  Ac
 
 OX  78      -2  -0      .0          ST  43  ND  ti
 
 IC  12      5-  8-      00          SI  14  ER  ve
 
 IL  61      20  20                  DE      SO    
 
 LI  30      09  09                         N     
 
 N   5                               PH      RO    
 
 50                                  AR      BE    
 
 0                                   MA      RT    
 
 MG                                  CY       W    
 
                                               
 
 CA                         OF            
 
 PS                         CY         
 
 UL                      NT      
 
 E                       HI      
 
                  AN      
 
                A       
 
                        
 
                        
 
                  
 
                  
 
                  
 
                  
 
                 
 
               
 
              
 
     00      09  10  00  20  3       EA  14  HE  Ac
 
     05      -2  -0      .0          ST  43  ND  ti
 
     44      5-  8-      00          SI  13  ER  ve
 
     65      20  20                  DE      SO    
 
     02      09  09                         N     
 
     9                               PH      RO    
 
                                     AR      BE    
 
                                     MA      RT    
 
                                     CY       W    
 
                                                
 
                            OF            
 
                            CY         
 
                         NT      
 
                         HI      
 
                  AN      
 
                A       
 
                        
 
                        
 
               
 
               
 
               
 
               
 
               
 
               
 
              
 
 IN  00      07  09  01  60  20      EA  13  MO  Ac
 
 DO  78      -1  -1      .0          ST  52  SE  ti
 
 ME  12      7-  0-      00          SI  24  S   ve
 
 TH  32      20  20                  DE      ST    
 
 AC  51      09  09                         EP    
 
 IN  0                               PH      HE    
 
                                    AR      N     
 
 25                                  MA      A     
 
                                    CY            
 
 MG                                              
 
                           OF            
 
 CA                         CY         
 
 PS                      NT      
 
 UL                      HI      
 
 E                AN      
 
                A       
 
                       
 
                     
 
                  
 
                  
 
                  
 
                  
 
                  
 
                 
 
              
 
 IN  00      07  07  00  60  20      EA  13  MO  Ac
 
 DO  78      -1  -3      .0          ST  52  SE  ti
 
 ME  12      7-  0-      00          SI  24  S   ve
 
 TH  32      20  20                  DE      ST    
 
 AC  51      09  09                         EP    
 
 IN  0                               PH      HE    
 
                                    AR      N     
 
 25                                  MA      A     
 
                                    CY            
 
 MG                                              
 
                           OF            
 
 CA                         CY         
 
 PS                      NT      
 
 UL                      HI      
 
 E                AN      
 
                A       
 
                       
 
                     
 
                  
 
                  
 
                  
 
                  
 
                  
 
                 
 
              
 
 IN  00      06  07  01  30  10      EA  13  MO  Ac
 
 DO  78      -1  -1      .0          ST  15  SE  ti
 
 ME  12      6-  6-      00          SI  69  S   ve
 
 TH  32      20  20                  DE      ST    
 
 AC  51      09  09                         EP    
 
 IN  0                               PH      HE    
 
                                    AR      N     
 
 25                                  MA      A     
 
                                    CY            
 
 MG                                              
 
                           OF            
 
 CA                         CY         
 
 PS                      NT      
 
 UL                      HI      
 
 E                AN      
 
                A       
 
                       
 
                     
 
                  
 
                  
 
                  
 
                  
 
                  
 
                 
 
              
 
 IN  00      06  07  00  30  10      EA  13  MO  Ac
 
 DO  78      -1  -0      .0          ST  15  SE  ti
 
 ME  12      6-  2-      00          SI  69  S   ve
 
 TH  32      20  20                  DE      ST    
 
 AC  51      09  09                         EP    
 
 IN  0                               PH      HE    
 
                                    AR      N     
 
 25                                  MA      A     
 
                                    CY            
 
 MG                                              
 
                           OF            
 
 CA                         CY         
 
 PS                      NT      
 
 UL                      HI      
 
 E                AN      
 
                A       
 
                       
 
                     
 
                  
 
                  
 
                  
 
                  
 
                  
 
                 
 
              
 
     00      06  07  00  60  20      EA  13  MO  Ac
 
     55      -1  -0      .0          ST  15  SE  ti
 
     50      6-  2-      00          SI  70  S   ve
 
     16      20  20                  DE      ST    
 
     30      09  09                         EP    
 
     2                               PH      HE    
 
                                     AR      N     
 
                                     MA      A     
 
                                     CY            
 
                                                 
 
                            OF            
 
                            CY         
 
                         NT      
 
                         HI      
 
                  AN      
 
                A       
 
                       
 
                     
 
               
 
               
 
               
 
               
 
               
 
               
 
              
 
     00      06  06  00  15  3       EA  13  HE  Ac
 
     59      -0  -1      .0          ST  00  ND  ti
 
     10      4  8-      00          SI  77  ER  ve
 
     38      20  20                  DE      SO    
 
     50      09  09                         N     
 
     1                               PH      RO    
 
                                     AR      BE    
 
                                     MA      RT    
 
                                     CY       W    
 
                                                
 
                            OF            
 
                            CY         
 
                         NT      
 
                         HI      
 
                  AN      
 
                A       
 
                        
 
                        
 
               
 
               
 
               
 
               
 
               
 
               
 
              
 
     00      05  05  00  20  3       EA  12  HE  Ac
 
     59      -0  -2      .0          ST  65  ND  ti
 
     10      7          SI  12  ER  ve
 
     38      20  20                  DE      SO    
 
     50      09  09                         N     
 
     1                               PH      RO    
 
                                     AR      BE    
 
                                     MA      RT    
 
                                     CY       W    
 
                                                
 
                            OF            
 
                            CY         
 
                         NT      
 
                         HI      
 
                  AN      
 
                A       
 
                        
 
                        
 
               
 
               
 
               
 
               
 
               
 
               
 
              
 
 [place]  00      05  05  00  12  2       EA  12  HE  Ac
 
 OM  78      -0  -2      .0          ST  65  ND  ti
 
 ET  11      7          SI  13  ER  ve
 
 HA  83      20  20                  DE      SO    
 
 ZI  01      09  09                         N     
 
 NE  0                               PH      RO    
 
                                    AR      BE    
 
 25                                  MA      RT    
 
                                    CY       W    
 
 MG                                             
 
                           OF            
 
 TA                         CY         
 
 BL                      NT      
 
 ET                      HI      
 
                  AN      
 
                A       
 
                        
 
                        
 
                  
 
                  
 
                  
 
                  
 
                  
 
               
 
              
 
     00      05  05  00  15  3       EA  12  HE  Ac
 
     59      -0  -2      .0          ST  67  ND  ti
 
     10      8          SI  12  ER  ve
 
     54      20  20                  DE      SO    
 
     00      09  09                         N     
 
     1                               PH      RO    
 
                                     AR      BE    
 
                                     MA      RT    
 
                                     CY       W    
 
                                                
 
                            OF            
 
                            CY         
 
                         NT      
 
                         HI      
 
                  AN      
 
                A       
 
                        
 
                        
 
               
 
               
 
               
 
               
 
               
 
               
 
              
 
 AM  00      05  05  00  30  10      EA  12  ST  Ac
 
 OX  78      -0  -2      .0          ST  60  EP  ti
 
 IC  12      4-  1-      00          SI  38  HE  ve
 
 IL  61      20  20                  DE      NS    
 
 LI  30      09  09                              
 
 N   5                               PH      KE    
 
 50                                  AR      VI    
 
 0                                   MA      N     
 
 MG                                  CY      C     
 
                                                
 
 CA                         OF            
 
 PS                         CY         
 
 UL                      NT      
 
 E                       HI      
 
                  AN      
 
                A       
 
                        
 
                       
 
                  
 
                  
 
                  
 
                  
 
                 
 
               
 
              
 
     00      04  04  00  20  5       EA  12  ST  Ac
 
     17      -1  -2      .0          ST  31  EP  ti
 
     26      3-  3-      00          SI  07  HE  ve
 
     35      20  20                  DE      NS    
 
     97      09  09                              
 
     0                               PH      KE    
 
                                     AR      VI    
 
                                     MA      N     
 
                                     CY      C     
 
                                                
 
                            OF            
 
                            CY         
 
                         NT      
 
                         HI      
 
                  AN      
 
                A       
 
                        
 
                       
 
               
 
               
 
               
 
               
 
               
 
               
 
              
 
 AM  00      04  04  00  30  10      EA  12  ST  Ac
 
 OX  78      -0  -2      .0          ST  26  EP  ti
 
 IC  12      9-  3          SI  87  HE  ve
 
 IL  61      20  20                  DE      NS    
 
 LI  30      09  09                              
 
 N   5                               PH      KE    
 
 50                                  AR      VI    
 
 0                                   MA      N     
 
 MG                                  CY      C     
 
                                                
 
 CA                         OF            
 
 PS                         CY         
 
 UL                      NT      
 
 E                       HI      
 
                  AN      
 
                A       
 
                        
 
                       
 
                  
 
                  
 
                  
 
                  
 
                 
 
               
 
              
 
     00      04  04  00  20  5       EA  12  ST  Ac
 
     60      -0  -2      .0          ST  26  EP  ti
 
     35      9-  3-      00          SI  86  HE  ve
 
     46      20  20                  DE      NS    
 
     82      09  09                              
 
     8                               PH      KE    
 
                                     AR      VI    
 
                                     MA      N     
 
                                     CY      C     
 
                                                
 
                            OF            
 
                            CY         
 
                         NT      
 
                         HI      
 
                  AN      
 
                A       
 
                        
 
                       
 
               
 
               
 
               
 
               
 
               
 
               
 
              
 
 CY  59      04  04  00  21  7       CL  19  WR  Ac
 
 CL  74      -1  -2      .0          IN  16  IG  ti
 
 OB  60      3-  3-      00          IC  91  HT  ve
 
 EN  17      20  20                              
 
 ZA  70      09  09                  PH      AR    
 
 KY  6                               AR      DY    
 
 IN                                  MA       C    
 
 E                                   CY            
 
 10                                                
 
                                                
 
 MG                                       
 
                                      
 
 TA                              
 
 BL                              
 
 ET                       
 
                        
 
                     
 
                  
 
               
 
               
 
               
 
               
 
               
 
               
 
     00      04  04  00  21  7       CL  19  WR  Ac
 
     55      -1  -2      .0          IN  16  IG  ti
 
     51      3-  3-      00          IC  92  HT  ve
 
     88      20  20                              
 
     30      09  09                  PH      AR    
 
     2                               AR      DY    
 
                                     MA       C    
 
                                     CY            
 
                                                   
 
                                                 
 
                                          
 
                                       
 
                                 
 
                                 
 
                          
 
                        
 
                     
 
                  
 
     68      04  04  00  20  10      CL  19  WR  Ac
 
     77      -1  -2      .0          IN  16  IG  ti
 
     40      3-  3-      00          IC  90  HT  ve
 
     12      20  20                              
 
     26      09  09                  PH      AR    
 
     0                               AR      DY    
 
                                     MA       C    
 
                                     CY            
 
                                                   
 
                                                  
 
                                          
 
                                       
 
                                 
 
                                 
 
                          
 
                        
 
                     
 
                  
 
                                                                                
                                                                                
                                                                                
                                                                                
                                                                                
                                                                                
                      
 
Vital Signs
                      2013 17:32            
 
 
 Name         Value        Interpretat  Reference   Comment    
 
                           ion          Range                   
 
                               
 
      
 
 Body   98.7 [degF]                                       
 
 Temperature                                                    
 
                                                            
 
                      
 
 BP   108 mm[Hg]                                        
 
 Diastolic                                                      
 
                                                           
 
                    
 
 BP Systolic  174 mm[Hg]                                        
 
                                                                
 
                                                       
 
              
 
 Heart   78 /min                                           
 
 Rate/Pulse                                                     
 
                                                        
 
                     
 
 O2%          98 %                                              
 
                                                               
 
                                        
 
              
 
 Respiratory  18 /min                                           
 
  Rate                                                          
 
                                                        
 
                
 
            2013 16:15            
 
 
 Name         Value        Interpretat  Reference   Comment    
 
                           ion          Range                   
 
                               
 
      
 
 BP   98 mm[Hg]                                         
 
 Diastolic                                                      
 
                                                          
 
                    
 
 BP Systolic  161 mm[Hg]                                        
 
                                                                
 
                                                       
 
              
 
 Heart   85 /min                                           
 
 Rate/Pulse                                                     
 
                                                        
 
                     
 
 O2%          95 %                                              
 
                                                               
 
                                        
 
              
 
 Respiratory  20 /min                                           
 
  Rate                                                          
 
                                                        
 
                
 
                                                                                
                           
 
Results
                      
 
 
 Labs                
 
 
 
 
 Lab   Lab   Date    Result  Refere  Interp  Status  Commen
 
 Order   Detail                  nces   retati          t     
 
                                 Range   on                    
 
                                                            
 
                                  
 
                
 
 
 
 
 COMPREHENSIVE METABOLIC PANEL (2013 16:11)
 
                 
 
 
 
 
          Glucose    100               complet
 
             013   mg/dL                      ed     
 
        Bld-mCn  16:11                                      
 
  c                                            
 
                                 
 
                 
 
          BUN     8 mg/dL  7-18              complet
 
          Bld-mCn  013                              ed     
 
        c        16:11                                      
 
                                          
 
                            
 
           
 
          Creat     0.9   0.6-1.0           complet
 
          SerPl-m  013   mg/dL                      ed     
 
        Cnc      16:11                                      
 
                                                
 
                                 
 
           
 
          GFR     68   59-               complet
 
          (ESTIMA  013   ML/MIN                     ed     
 
        EVELINE)     16:11                                      
 
                                           
 
                                  
 
             
 
          Sodium     139   136-145           complet
 
          SerPl-s  013   mmoL/L                     ed     
 
        Cnc      16:11                                      
 
                                                
 
                                  
 
           
 
          Potassi  -2  4.0   3.5-5.1           complet
 
          um   013   mmoL/L                     ed     
 
        SerPl-s  16:11                                      
 
  Cnc                                           
 
                                  
 
                   
 
          Chlorid  -2  105               complet
 
          e   013   mmoL/L                     ed     
 
        SerPl-s  16:11                                      
 
  Cnc                                          
 
                                  
 
                   
 
          CO2   03-2  29   21.0-32           complet
 
          SerPl-s  013   mmoL/L   .0                ed     
 
        Cnc      16:11                                      
 
                                                  
 
                                  
 
           
 
          Calcium  03-2  8.9   8.5-10.           complet
 
             013   mg/dL    1                 ed     
 
        SerPl-m  16:11                                      
 
  Cnc                                            
 
                                 
 
                   
 
          Prot   03-2  7.5   6.4-8.2           complet
 
          SerPl-m  013   gm/dL                      ed     
 
        Cnc      16:11                                      
 
                                                
 
                                 
 
           
 
          Albumin  03-2  3.5   3.4-5.0           complet
 
             013   gm/dL                      ed     
 
        SerPl-m  16:11                                      
 
  Cnc                                           
 
                                 
 
                   
 
          Globuli  -2  4.0   1.3-3.2           complet
 
          n   013   gm/dL                      ed     
 
        Ser-mCn  16:11                                      
 
  c                                             
 
                                 
 
                 
 
          Albumin  -2  0.9 UNK  1.1-1.8           complet
 
          /Glob   013                              ed     
 
        SerPl-m  16:11                                      
 
  Rto                                        
 
                              
 
                   
 
          Bilirub  -2  0.6   0.2-1.0           complet
 
             013   mg/dL                      ed     
 
        SerPl-m  16:11                                      
 
  Cnc                                           
 
                                 
 
                   
 
          AST   07-03-2  13 U/L   15-37             complet
 
          SerPl-c  013                              ed     
 
        Cnc      16:11                                      
 
                                          
 
                              
 
           
 
          ALT   07-03-2  33 U/L   30-65             complet
 
          SerPl-c  013                              ed     
 
        Cnc      16:11                                      
 
                                          
 
                              
 
           
 
          ALP   03-2  103 U/L              complet
 
          SerPl-c  013                              ed     
 
        Cnc      16:11                                      
 
                                            
 
                              
 
           
 
 
 
 
 CBC with AUTO DIFF (2013 16:11)
 
                 
 
 
 
 
          WBC #   07-03-2  6.4   4.8-10.           complet
 
          Bld   013   K/MM3    8                 ed     
 
        Auto     16:11                                      
 
                                                 
 
                                 
 
             
 
          RBC #   07-03-2  4.07   4.2-5.4           complet
 
          Bld   013   M/mm3                      ed     
 
        Auto     16:11                                      
 
                                                
 
                                 
 
             
 
          Hgb   -03-2  12.1   12.2-16           complet
 
          Bld-mCn  013   g/dL     .2                ed     
 
        c        16:11                                      
 
                                                  
 
                              
 
           
 
          Hct Fr   -2  37.2 %   37.0-47           complet
 
          Bld      013            .0                ed     
 
                 16:11                                      
 
                                              
 
                      
 
           
 
          MCV RBC  03-2  91.4 fl  82.2-97           complet
 
                   013            .8                ed     
 
                 16:11                                      
 
                                            
 
                      
 
           
 
          MCH RBC  -2  29.8 pg  27-31.2           complet
 
           Qn   013                              ed     
 
        Auto     16:11                                      
 
                                             
 
                              
 
             
 
          MEAN   -2  32.6   31.8-35           complet
 
          CORPUSC  013   g/dl     .4                ed     
 
        ULAR   16:11                                      
 
  HGB                                          
 
  CONC                          
 
                   
 
              
 
        
 
          RDW RBC  -2  14.5 %   11.5-17           complet
 
           Auto    013            .5                ed     
 
                 16:11                                      
 
                                              
 
                         
 
           
 
          Platele  -2  297   142-424           complet
 
          t Bld   013   K/mm3                      ed     
 
        Ql   16:11                                      
 
  Manual                                        
 
                                 
 
                   
 
          
 
          MEAN     7.6 fl   7.4-10.           complet
 
          PLATELE  013            4                 ed     
 
        T   16:11                                      
 
  VOLUME                                     
 
                              
 
                   
 
          
 
          Granulo  -03-2  71.8 %   37.0-80           complet
 
          cytes   013            .0                ed     
 
        Fr Bld   16:11                                      
 
  Auto                                        
 
                              
 
                   
 
        
 
          LYMPH %  -03-2  20.9 %   10-50.0           complet
 
                   013                              ed     
 
                 16:11                                      
 
                                         
 
                      
 
           
 
          Monocyt  03-2  5.7 %    1.7-9.3           complet
 
          es Fr   013                              ed     
 
        Bld   16:11                                      
 
  Auto                                     
 
                              
 
                   
 
        
 
          Eosinop  07-03-2  1.3 %    0.1-12.           complet
 
          hil Fr   013            0                 ed     
 
        Bld   16:11                                      
 
  Auto                                      
 
                              
 
                   
 
        
 
          Basophi  07-03-2  0.3 %    0.1-2.0           complet
 
          ls Fr   013                              ed     
 
        Bld   16:11                                      
 
  Auto                                     
 
                              
 
                   
 
        
 
          Granulo  07-03-2  4.6   1.8-7.8           complet
 
          cytes #  013   K/mm3                      ed     
 
         Bld   16:11                                      
 
  Auto                                          
 
                                 
 
                   
 
        
 
          Lymphoc  07-03-2  1.3   0.7-4.5           complet
 
          ytes Fr  013   K/mm3                      ed     
 
         Bld   16:11                                      
 
  Auto                                          
 
                                 
 
                   
 
        
 
          Monocyt  07-03-2  0.4   0.1-1.0           complet
 
          es #   013   K/mm3                      ed     
 
        Bld   16:11                                      
 
  Auto                                          
 
                                 
 
                   
 
        
 
          Eosinop  07-03-2  0.1   0.0-0.4           complet
 
          hil #   013   K/mm3                      ed     
 
        Bld   16:11                                      
 
  Auto                                          
 
                                 
 
                   
 
        
 
          Basophi  07-03-2  0.0   0-0.2             complet
 
          ls #   013   K/MM3                      ed     
 
        Bld   16:11                                      
 
  Auto                                        
 
                                 
 
                   
 
        
 
 
 
 
 B-HCG Ur Ql (2013 15:51)
 
                 
 
 
 
 
          B-HCG   -2  NEGATIV  NEG               complet
 
          Ur Ql    013   E                          ed     
 
                 15:51                                      
 
                                         
 
                         
 
           
 
 
 
 
 URINALYSIS/COMPLETE (2013 15:51)
 
                 
 
 
 
 
          URINE   -2  YELLOW   YELLOW            complet
 
          COLOR    013                              ed     
 
                 15:51                                      
 
                                           
 
                         
 
           
 
          URINE   07-03-2  CLEAR    CLEAR             complet
 
          APPEARA  013                              ed     
 
        NCE      15:51                                      
 
                                         
 
                              
 
           
 
          URINE   07-03-2  NEGATIV  NEG               complet
 
          GLUCOSE  013   E                          ed     
 
         -   15:51                                      
 
  DIPSTIC                                
 
  K                           
 
                   
 
            
 
          URINE   07-03-2  NEGATIV  NEG               complet
 
          BILIRUB  013   E                          ed     
 
        IN -   15:51                                      
 
  DIPSTIC                                
 
  K                           
 
                   
 
            
 
          URINE   07-03-2  NEGATIV  NEG               complet
 
          KETONE   013   E mg/dL                    ed     
 
                 15:51                                      
 
                                           
 
                               
 
           
 
          URINE   07-03-2  1.020   1.005-1           complet
 
          SPECIFI  013   UNK      .030              ed     
 
        C   15:51                                      
 
  GRAVITY                                          
 
                                
 
                   
 
           
 
          URINE   -03-2  TRACE-I  NEG               complet
 
          BLOOD    013   NTACT                      ed     
 
                 15:51                                      
 
                                           
 
                            
 
           
 
          URINE   07-03-2  6.0 UNK  5.0-8.5           complet
 
          PH       013                              ed     
 
                 15:51                                      
 
                                            
 
                      
 
           
 
          URINE   07-03-2  NEGATIV  NEG               complet
 
          PROTEIN  013   E mg/dL                    ed     
 
         -   15:51                                      
 
  DIPSTIC                                  
 
  K                                
 
                   
 
            
 
          URINE   07-03-2  0.2   NEG               complet
 
          UROBILI  013   E.U./dL                    ed     
 
        NOGEN -  15:51                                      
 
                                     
 
  DIPSTIC                          
 
  K                
 
              
 
            
 
          URINE   07-03-2  NEGATIV  NEG               complet
 
          NITRATE  013   E                          ed     
 
         -   15:51                                      
 
  DIPSTIC                                
 
  K                           
 
                   
 
            
 
          URINE   07-03-2  1+       NEG               complet
 
          LEUK   013                              ed     
 
        ESTERAS  15:51                                     
 
  E                                
 
                              
 
                 
 
          URINE   07-03-2  3-5   0                 complet
 
          RBC      013   rbc/hpf                    ed     
 
                 15:51                                    
 
                                           
 
                           
 
           
 
          URINE   07-03-2  5-10   O                 complet
 
          WBC      013   wbc/hpf                    ed     
 
                 15:51                                    
 
                                           
 
                           
 
           
 
          URINE   07-03-2  5-10   0-5               complet
 
          SQUAMOU  013   #/hpf                      ed     
 
        S CELLS  15:51                                      
 
                                           
 
                                 
 
                
 
          URINE   07-03-2  2+       O                 complet
 
          BACTERI  013                              ed     
 
        A        15:51                                   
 
                                   
 
                            
 
           
 
                                                                                
                                                                                
                                                                                
                                                                                
                                                                                
                                                                                
                                                                                
                                                                       
 
Procedures
                      
 
 
 Procedure  DOS        Code       Location   Performer  Comment  
 
                                                                 
 
                                                 
 
 INCISION     17624      [facility]   [facility]             
 
 &                        PHYSICIAN                     
 
 DRAINAGE                     [facility]            
 
 ABSCESS                  
 
 COMPLICAT             
 
 ED/MULTIP     
 
 LE            
 
               
 
        
 
 IV     57021      [facility]            
 
 INFUSION   5                     [facility]           
 
 THERAPY/P                    INC        INC       
 
 ROPHYLAXI                           
 
 S /DX 1ST             
 
  TO 1 HR      
 
               
 
              
 
 HEPATITIS    01592      [facility]            
 
  C   5                     [facility]           
 
 ANTIBODY                     INC        INC       
 
                                     
 
                      
 
 CULTURE     96357      [facility]            
 
 BACTERIAL  5                     [facility]           
 
  BLOOD                     INC        INC       
 
 AEROBIC                            
 
 W/ID              
 
 ISOLATES      
 
               
 
              
 
 BLOOD     57765      [facility]            
 
 COUNT   5                     [facility]           
 
 COMPLETE                     INC        INC       
 
 AUTO&AUTO                           
 
  DIFRNTL              
 
 WBC           
 
               
 
         
 
 COMPREHEN    03691      [facility]            
 
 SIVE   5                     [facility]           
 
 METABOLIC                    INC        INC       
 
  PANEL                              
 
                       
 
            
 
 HEPATITIS    58201      [facility]            
 
  A   5                     [facility]           
 
 ANTIBODY                     INC        INC       
 
 HAAB                                
 
                       
 
          
 
 [place]     [address]      [facility]            
 
 LACTATE    5                     [facility]           
 
                              INC        INC       
 
                                   
 
             
 
 HEPATITIS    32390      [facility]            
 
  B CORE   5                     [facility]           
 
 ANTIBODY                     INC        INC       
 
 HBCAB                            
 
 TOTAL                 
 
               
 
           
 
 HEPATITIS    14516      [facility] SURF   5                     [facility]           
 
 ANTIBODY                     INC        INC       
 
 HBSAB                               
 
                       
 
           
 
 [place]     [address]      [facility]            
 
 HEPATITIS  5                     [facility]           
 
  B                     INC        INC       
 
 SURFACE                            
 
 ANTIGEN               
 
               
 
             
 
 [place]   10-  [address]      [facility]            
 
 THYROXINE  5                     [facility]           
 
  TOTAL                       INC        INC       
 
                                     
 
                    
 
 COMPREHEN  10-  72792      [facility]            
 
 SIVE   5                     [facility]           
 
 METABOLIC                    INC        INC       
 
  PANEL                              
 
                       
 
            
 
 [place]   10-  [address]      [facility]            
 
 THYROID   5                     [facility]           
 
 STIMULATI                    INC        INC       
 
 NG                            
 
 HORMONE              
 
 TSH           
 
               
 
         
 
 COLLECTIO  10-  12004      [facility]            
 
 N VENOUS   5                     [facility]           
 
 BLOOD                     INC        INC       
 
 VENIPUNCT                           
 
 URE                   
 
               
 
         
 
 HEMOGLOBI  10-  20205      [facility]            
 
 N   5                     [facility]           
 
 GLYCOSYLA                    INC        INC       
 
 EVELINE A1C                             
 
                       
 
             
 
 BLOOD   10-  46749      [facility]            
 
 COUNT   5                     [facility]           
 
 COMPLETE                     INC        INC       
 
 AUTO&AUTO                           
 
  DIFRNTL              
 
 WBC           
 
               
 
         
 
 HEPATITIS    18295      [facility]            
 
  C   5                     [facility]           
 
 ANTIBODY                     INC        INC       
 
                                     
 
                      
 
 BLOOD     89460      [facility]            
 
 COUNT   5                     [facility]           
 
 COMPLETE                     INC        INC       
 
 AUTO&AUTO                           
 
  DIFRNTL              
 
 WBC           
 
               
 
         
 
 COMPREHEN    75829      [facility]            
 
 SIVE   5                     [facility]           
 
 METABOLIC                    INC        INC       
 
  PANEL                              
 
                       
 
            
 
 COLLECTIO    06393      [facility]            
 
 N VENOUS   5                     [facility]           
 
 BLOOD                     INC        INC       
 
 VENIPUNCT                           
 
 URE                   
 
               
 
         
 
 INF AGT           [facility]            
 
 AB DETECT  5                     [facility]           
 
  EIA TECH                    INC        INC       
 
                             
 
 HIV-1&/HI             
 
 V-2 SCR       
 
               
 
             
 
 HEPATITIS    15624      [facility]            
 
  A   5                     [facility]           
 
 ANTIBODY                     INC        INC       
 
 HAAB                                
 
                       
 
          
 
 [place]     [address]      [facility]            
 
 THYROID   5                     [facility]           
 
 STIMULATI                    INC        INC       
 
 NG                            
 
 HORMONE              
 
 TSH           
 
               
 
         
 
 [place]     [address]      [facility]            
 
 AMMONIA    5                     [facility]           
 
                              INC        INC       
 
                                   
 
             
 
 THROMBOPL    38390      [facility]            
 
 ASTIN   5                     [facility]           
 
 TIME                     INC        INC       
 
 PARTIAL                            
 
 PLASMA/WH             
 
 OLE BLOOD     
 
               
 
               
 
 HEPATITIS    19616      [facility] CORE   5                     [facility]           
 
 ANTIBODY                     INC        INC       
 
 HBCAB                            
 
 TOTAL                 
 
               
 
           
 
 PROTHROMB    76898      [facility]            
 
 IN TIME    5                     [facility]           
 
                              INC        INC       
 
                                   
 
             
 
 [place]     [address]      [facility]            
 
 HEPATITIS  5                     [facility]           
 
  B                     INC        INC       
 
 SURFACE                            
 
 ANTIGEN               
 
               
 
             
 
 HEPATITIS    11321      [facility] SURF   5                     [facility]           
 
 ANTIBODY                     INC        INC       
 
 HBSAB                               
 
                       
 
           
 
 [place]     [address]      [facility]            
 
 THYROXINE  5                     [facility]           
 
  TOTAL                       INC        INC       
 
                                     
 
                    
 
 RADIOLOGI    29281      [facility] ELLIS ALL            
 
 C EXAM   5                     MEDICAL                      
 
 CHEST 2                     IMAGING            
 
 VIEWS       ASS        
 
 FRONTAL&L               
 
 ATERAL            
 
               
 
            
 
 [place]     [address]      [place]   BOND ALL            
 
 HEAD/BRAI  5                     MEDICAL                      
 
 N W/O                     IMAGING            
 
 CONTRAST       ASS        
 
 MATERIAL                
 
                   
 
              
 
 RADEX     78972      [facility]            
 
 SPINE   5                     MEDICAL                      
 
 LUMBOSACR                    IMAGING            
 
 AL 2/3       ASS        
 
 VIEWS                   
 
                   
 
           
 
 INITIAL     64310      RENEE SADI RENEE SADI            
 
 INPATIENT  4                                                   
 
  CONSULT                                        
 
 NEW/ESTAB       
 
  PT 20      
 
 MIN           
 
               
 
         
 
 CT     8[address]62      [facility]JESH   RAJESH            
 
 ABDOMEN &  4                     PAMELA        PAMELA                
 
  PELVIS                                          
 
 W/O                
 
 CONTRAST      
 
 MATERIAL      
 
               
 
              
 
 OPHTH     13087      FISH ARNOLD,            
 
 MEDICAL   0                     VISION     GENA M           
 
 XM&EVAL                                          
 
 COMPRHNSV                       
 
  ESTAB PT     
 
  1/>          
 
               
 
          
 
 TENS           EMPI INC   EMPI INC            
 
 DEVICE   0                                                   
 
 4/MORE                                        
 
 LEADS        
 
 MULTI      
 
 NERVE      
 
 STIMULATI     
 
 ON            
 
               
 
        
 
 APPLICATI    81908      [facility]            
 
 ON   0                     [facility]           
 
 MODALITY                     INC        INC       
 
 1/> AREAS                           
 
  HOT/COLD             
 
  PACKS        
 
               
 
            
 
 APPL     69954      [facility]            
 
 MODALITY   0                     [facility]           
 
 1/> AREAS                    INC        INC       
 
  ELEC                            
 
 STIMJ              
 
 UNATTENDE     
 
 D             
 
               
 
       
 
 THERAPEUT    72004      [facility]            
 
 IC PX 1/>  0                     [facility]           
 
  AREAS                     INC        INC       
 
 EACH 15                            
 
 MIN              
 
 EXERCISES     
 
               
 
               
 
 APPL     35195      [facility]            
 
 MODALITY   0                     [facility]           
 
 1/> AREAS                    INC        INC       
 
  ELEC                            
 
 STIMJ EA              
 
 15 MIN        
 
               
 
            
 
 APPL     00464      [facility]            
 
 MODALITY   0                     [facility]           
 
 1/> AREAS                    INC        INC       
 
  ELEC                            
 
 STIMJ              
 
 UNATTENDE     
 
 D             
 
               
 
       
 
 THERAPEUT    91101      [facility]            
 
 IC PX 1/>  0                     [facility]           
 
  AREAS                     INC        INC       
 
 EACH 15                            
 
 MIN              
 
 EXERCISES     
 
               
 
               
 
 APPLICATI    81649      [facility]            
 
 ON   0                     [facility]           
 
 MODALITY                     INC        INC       
 
 1/> AREAS                           
 
  HOT/COLD             
 
  PACKS        
 
               
 
            
 
 APPLICATI    81789      [facility]            
 
 ON   0                     [facility]           
 
 MODALITY                     INC        INC       
 
 1/> AREAS                           
 
  HOT/COLD             
 
  PACKS        
 
               
 
            
 
 THERAPEUT    32281      [facility]            
 
 IC PX 1/>  0                     [facility]           
 
  AREAS                     INC        INC       
 
 EACH 15                            
 
 MIN              
 
 EXERCISES     
 
               
 
               
 
 PHYSICAL     54725      [facility]            
 
 THERAPY   0                     [facility]           
 
 EVALUATIO                    INC        INC       
 
 N                                   
 
                       
 
       
 
 APPL     68289      [facility]            
 
 MODALITY   0                     [facility]           
 
 1/> AREAS                    INC        INC       
 
  ELEC                            
 
 STIMJ              
 
 UNATTENDE     
 
 D             
 
               
 
       
 
 PSYCHOLOG    23125      PHYSICIAN  [facility],            
 
 ICAL   0                     S   [facility]          
 
 TESTING                     SERVICES             
 
 ADMN BY       [facility]                  
 
 TECH [place]                
 
 HR                
 
               
 
        
 
 RADEX     61071      [place]   NETO            
 
 SPINE   0                     MEDICAL   SHELLY P           
 
 LUMBOSACR                    IMAGING             
 
 AL       ASSOCIATE           
 
 MINIMUM 4    S          
 
  VIEWS                  
 
                 
 
            
 
 LEVEL III  10-  77249      ORAL   ORAL            
 
  SURG   9                     PATHOLOGY  PATHOLOGY          
 
 PATHOLOGY                         
 
        LABORATOR  LABORATOR 
 
 GROSS&[facility]    Y          Y         
 
 ROSCOPIC                          
 
 EXAM              
 
               
 
          
 
 THER     41487      SEBAS MULLEN           
 
 PROPH/DX   9                     DANIEL ROBERT           
 
 NJX IV                     W          W         
 
 PUSH                            
 
 SINGLE/1S         
 
 T      
 
 SBST/DRUG     
 
               
 
               
 
 BIOPSY OF    34774      SEBAS MULLEN           
 
  LIP       9                     DANIEL ROBERT W W         
 
                                
 
         
 
 ORTHOPANT    97523      SEBAS MULLEN           
 
 OGRAM      9                     DANIEL ROBERT W          W         
 
                                 
 
         
 
 DEEP           SEBAS MULLEN           
 
 SEDATION/  DANIEL Ivy ROBERT           
 
 GENERAL                     W          W         
 
 ANESTHESI                           
 
 A-1ST 30          
 
 MINUTES       
 
               
 
             
 
 3D     85953      REYNA C  RAJESH,           
 
 RENDERING  9                      RAJESH   REYNA           
 
  W/INTERP                                         
 
  &                           
 
 POSTPROCE     
 
 SS      
 
 SUPERVISI     
 
 ON            
 
               
 
        
 
 MRI     76190      REYNA C  RAJESH,           
 
 SPINAL   9                      RAJESH   REYNA           
 
 CANAL                                          
 
 LUMBAR                           
 
 W/O      
 
 CONTRAST      
 
 MATERIAL      
 
               
 
              
 
 RADEX     27793      [facility]            
 
 SPINE   9                     [facility]           
 
 LUMBOSACR                    INC        INC       
 
 AL                            
 
 MINIMUM 4             
 
  VIEWS        
 
               
 
            
 
                                                                                
                                                                                
                                                                                
                                                                                
                                                                                
                                                                                
                                                                                
                                                                                
                              
 
Encounters
                      
 
 
 Encounter  Start   End Date   Code       Location   Performer
 
  Type      Date                                                 
 
                                                        
 
                
 
 EMERGENCY    85542      [facility]  
 
    7          7                     PHYSICIAN           
 
 DEPARTMEN                               [facility]         
 
 T VISIT                    
 
 HIGH/URGE             
 
 NT      
 
 SEVERITY      
 
               
 
              
 
 EMERGENCY    47274      [facility] ROMERO 
 
    5          5                     PHYSICIAN  [facility]      
 
 DEPARTMEN                               [facility]             
 
 T VISIT                      
 
 HIGH/URGE             
 
 NT      
 
 SEVERITY      
 
               
 
              
 
 H[facility]                [facility]            
 
 -   5          5                     [facility]            
 
 OUTPATIEN                                   INC                 
 
 T                                             
 
                   
 
       
 
 EMERGENCY    46826      [facility]            
 
    5          5                     [facility]            
 
 DEPARTMEN                               INC                 
 
 T VISIT                            
 
 MODERATE          
 
 SEVERITY      
 
               
 
              
 
 HOSPITAL   10-  10-             [facility]            
 
 -   5          5                     [facility]            
 
 OUTPATIEN                                   INC                 
 
 T                                             
 
                   
 
       
 
 OFFICE     93036      [facility]   HEATHER 
 
 OUTPATIEN  5          5                     PHYSICIAN  [facility]      
 
 T VISIT                                S GROUP             
 
 15                      
 
 MINUTES               
 
               
 
             
 
 HOSPITAL                [facility]            
 
 -   5          5                     [facility]            
 
 OUTPATIEN                                   INC                 
 
 T                                             
 
                   
 
       
 
 Emergency    MOHINI Soliz MD
 
 (ER)       3 15:50    3 17:45               [facility]            
 
                                                
 
                  
 
 OFFICE     74656      JOYCE COOK OUTPATIEN  0          0                     VINICIO COOPER
 
 T VISIT                                                    
 
 15                            
 
 MINUTES       
 
               
 
             
 
 OFFICE     26040      JOYCE COOK OUTPATIEN  0          0                     VINICIO COOPER
 
 T VISIT                                                    
 
 15                            
 
 MINUTES       
 
               
 
             
 
 OFFICE     87301      PHYSICIAN  VISHNU CRUZ  0          0                     S   [facility]   
 
 T VISIT                                SERVICES            
 
 25          PSC            
 
 MINUTES                 
 
                   
 
             
 
 OFFICE     48897      JOYCE COOK OUTPATIEN  0          0                     VINICIO COOPER
 
 T VISIT                                                    
 
 15                            
 
 MINUTES       
 
               
 
             
 
 HOSPITAL                [facility]            
 
 -   0          0                     [facility]            
 
 OUTPATIEN                                   INC                 
 
 T                                             
 
                   
 
       
 
 OFFICE     39887      PHYSICIAN  VISHNU THOMAS  0          0                     S   [facility]                                SERVICES            
 
 MINUTES           PSC               
 
                         
 
                 
 
 EMERGENCY    70442      [facility]            
 
    0          0                     [facility]            
 
 DEPARTMEN                               INC                 
 
 T VISIT                            
 
 LOW/MODER         
 
  SEVERITY     
 
               
 
               
 
 HOSPITAL                [facility]            
 
 -   0          0                     [facility]            
 
 OUTPATIEN                                   INC                 
 
 T                                             
 
                   
 
       
 
 EMERGENCY    27188      MERLE ROMERO, 
 
    0          0                     EMERGENCY  [facility]
 
 [facility]           
 
 T VISIT                    
 
 HIGH/URGE     ASSOCIATE 
 
 NT      S         
 
 SEVERITY                
 
                 
 
              
 
 OFFICE   10-  10-  16975      JOYCE COOK OUTPATIEN  9          9                     VINICIO COOPER
 
 T NEW 30                                                    
 
 MINUTES                             
 
               
 
             
 
 OFFICE     [id number]      SRI CORDERO, 
 
 CONSULTAT  9          9                     MEDICAL   [facility]
 
 ION                                SERV            
 
 NEW/ESTAB         FOUNDAT[facility]         
 
  PATIENT                
 
 40 MIN                  
 
               
 
            
 
 OFFICE     [id number]      ASA REYES  9          9                     [facility] VISIT                                 [facility]                
 
 15                    
 
 MINUTES            
 
               
 
             
 
 OFFICE     [id number]      ASA REYES  9          9                     [facility] VISIT                                 [facility]                
 
 15                    
 
 MINUTES            
 
               
 
             
 
 [facility]                [facility]            
 
 -   9          9                     [facility]            
 
 OUTPATIEN                                   INC                 
 
 T                                             
 
                   
 
       
 
 OFFICE     [id number]      ASA REYES  9          9                     [facility] VISIT                                 [facility]                
 
 15                    
 
 MINUTES            
 
               
 
             
 
 OFFICE     [id number]      ASA REYES  9          9                     [facility] VISIT                                 [facility]                
 
 15                    
 
 MINUTES            
 
               
 
             
 
 [facility]                [facility]            
 
 -   9          9                     [facility]            
 
 OUTPATIEN                                   INC                 
 
 T

## 2017-10-07 NOTE — EXTERNAL MEDICAL SUMMARY RPT
Patient Summary
 Created on: 10/05/2017
 
BERYL MANN
: 68
Sex: Undifferentiated
 
Demographics
 
 
 
 Preferred Language  English
 
 Marital Status  Unknown
 
 Muslim Affiliation  Unknown
 
 Race  Unknown
 
 Ethnic Group  Unknown
 
 
Author
 
 
 
 Author            SAMIR
 
 Address  Unknown
 
 Phone  [email or web address]
 
                                                                
 
Immunization
                                    No patient found.

## 2017-10-07 NOTE — EXTERNAL MEDICAL SUMMARY RPT
Patient Summary
 Created on: 10/05/2017
 
BERYL MANN
External Reference #: [id number]
: 68
Sex: Female
 
Demographics
 
 
 
 Address  [address] [place]  [address]
 
 Preferred Language  English
 
 Marital Status  Unknown
 
 Holiness Affiliation  Unknown
 
 Race  Unknown
 
 Ethnic Group  Unknown
 
 
Author
 
 
 
 Author            ,            SAMIR DAWSON
 
 Address  Unknown
 
 Phone  [email or web address]
 
 
 
Care Team Providers
 
 
 
 Care Team Member Name  Role  Phone
 
 VINICIO COOK,   Unavailable  Unavailable
 
 VINICIO COOK   
 
 ELLIS ALL, ELLIS ALL   Unavailable  Unavailable
 
 [facility]  Unavailable
 
 [facility]   
 
 RAJESH PAMELA,   Unavailable  Unavailable
 
 RAJESH PAMELA   
 
 RAJESH PAMELA,   Unavailable  Unavailable
 
 RAJESH PAMELA   
 
 RAJESH, REYNA,   Unavailable  Unavailable
 
 RAJESH, REYNA   
 
 [facility]  Unavailable  Unavailable
 
  [facility]  
 
    
 
 [facility]   Unavailable  Unavailable
 
 [place]   
 
 [facility] [place]  
 
    
 
 [facility]   Unavailable  Unavailable
 
 [place]  
 
  [facility]  
 
    
 
 [facility]   Unavailable  Unavailable
 
 TREMAYNE BECKMANEY   Unavailable  Unavailable
 
 [facility]   
 
 PITO ROMERO,   Unavailable  Unavailable
 
 PITO ROMERO JOHN W,   Unavailable  Unavailable
 
 SHAHLA CRUZ   
 
 [facility]   Unavailable  Unavailable
 
 [facility]   
 
 [facility]   
 
 DANIEL MULLEN,   Unavailable  Unavailable
 
 DANIEL MULLEN   
 
 [facility]  Unavailable  Unavailable
 
  [facility]  
 
    
 
 [facility]   Unavailable  Unavailable
 
 [facility]   
 
 VÍCTOR GRAVES   Unavailable  Unavailable
 
 VÍCTOR GRAVES   Unavailable  Unavailable
 
 TASH THOMAS,   Unavailable  Unavailable
 
 TASH THOMAS EMMETT P,   Unavailable  Unavailable
 
 SHELLY DUQUE   
 
 [facility]   Unavailable  Unavailable
 
 [facility]   
 
 [facility]   
 
 [facility]   [facility]available  Unavailable
 
 [facility]   
 
 [facility]   
 
 RENUSCH, RENUSCH   Unavailable  Unavailable
 
 GENA ARNOLD,   Unavailable  Unavailable
 
 GENA ARNOLD PHILLIP A,   Unavailable  Unavailable
 
 ÁLVARO CORDERO A C, WRIGHT,   Unavailable  Unavailable
 
 ASA C   
 
                                            
 
Purpose
                      Continuity of Care Document - 2009 through 10-05-
2017                                                                            
                     
 
Problems
                      
 
 
 Code         Diagnosis    DOS          Provider     Status     
 
                                                               
 
               
 
 [id number]       CUTANEOUS   2017   [facility]              
 
              ABSCESS OF                [facility]ICIANS,             
 
      RIGHT UPPER           PLLC                   
 
   LIMB                       
 
                  
 
      
 
 [id number]         ANEMIA   2015   [facility]              
 
              UNSPECIFIED               [facility]ICIANS,             
 
                            PLLC                   
 
                          
 
      
 
 I10          ESSENTIAL   2015   [facility]              
 
      HYPERTENSIO          INC                     
 
  N                          
 
             
 
 [id number]       CELLULITIS   2015   [facility]              
 
              OF RIGHT                PHYSICIANS,             
 
      UPPER LIMB            PLLC                   
 
                              
 
             
 
 R945         ABNORMAL   2015   [facility]              
 
              RESULTS OF                [facility]              
 
      LIVER           INC                     
 
  FUNCTION                 
 
  STUDIES       
 
                
 
        
 
 Z720         TOBACCO USE  2015   [facility]              
 
                                        [facility]              
 
                       INC                     
 
                 
 
 [id number]       UNSPECIFIED  10-   [facility]              
 
               [facility]HMA                [facility]              
 
      UNCOMPLICAT          INC                     
 
  ED                         
 
              
 
 [id number]        ASTHMA,   2015   [facility]              
 
              UNSPECIFIED               PHYSICIANS              
 
      ,           GROUP                   
 
  UNSPECIFIED                 
 
   STATUS         
 
                
 
        
 
 [id number]        OTHER   2015   [facility]              
 
              MALAISE AND               PHYSICIANS              
 
       FATIGUE             GROUP                   
 
                              
 
           
 
 7905         OTHER   2015   [facility]              
 
              NONSPECIFIC               PHYSICIANS              
 
       ABNORMAL           GROUP                   
 
  SERUM                  
 
  ENZYME      
 
  LEVELS        
 
                
 
       
 
 30107        DEGEN   2015   [place]              
 
              LUMBAR/LUMB               MEDICAL              
 
      OSACRAL           IMAGING ASS             
 
  INTERVERTEB                 
 
  RAL DISC              
 
                
 
         
 
 7245         UNSPECIFIED  2015   [place]              
 
               BACKACHE                 MEDICAL              
 
                           IMAGING ASS             
 
                        
 
            
 
 7804         DIZZINESS   2015   [place]              
 
              AND                MEDICAL              
 
      GIDDINESS            IMAGING ASS             
 
                              
 
                  
 
 20824        SHORTNESS   2015   [place]              
 
              OF BREATH                 MEDICAL              
 
                           IMAGING ASS             
 
                        
 
            
 
 2859         UNSPECIFIED  2014   [facility]                
 
               ANEMIA                                           
 
                                          
 
          
 
 6202         OTHER AND   2014   [facility]              
 
              UNSPECIFIED               [facility]ELA                     
 
     OVARIAN                                  
 
  CYST            
 
                
 
 3384         CHRONIC   2010   [facility],              
 
              PAIN                VINICIO W               
 
    SYNDROME                                     
 
                        
 
         
 
 V154         PERS HX   2010   [facility]PT FOR              
 
              PSYCHOLOGIC               [facility]             
 
    AL TRAUMA                                   
 
  PRS HAZARDS             
 
   HEALTH       
 
                
 
        
 
 92107        REGULAR   2010   [facility]              
 
              ASTIGMATISM               VISION                  
 
                                                 
 
                 
 
 87726        UNSPECIFIED  2010   [facility]              
 
                              SERVICES              
 
    ARTHROPATHY          PSC                     
 
   OTHER                 
 
  SPECIFIED    
 
  SITES         
 
                
 
      
 
 47053        DISPLCMT   2010   [facility]              
 
              LUMBAR                SERVICES              
 
    INTERVERT           PSC                     
 
  DISC W/O                 
 
  MYELOPATHY    
 
                
 
           
 
 7242         LUMBAGO      2010   [facility]              
 
                                        SERVICES              
 
                 PSC                     
 
                 
 
 68952        SPINAL STEN  2010   [facility]              
 
               LUMB REG                [facility]              
 
    W/O           INC                     
 
  NEUROGENIC                 
 
  CLAUDICATIO   
 
  N             
 
             
 
 V571         OTHER   2010   [facility]              
 
              PHYSICAL                [facility]              
 
    THERAPY              INC                     
 
                             
 
        
 
 79677        CONTUSION   2010   [facility]              
 
              OF BACK                   [facility]              
 
                         INC                     
 
                     
 
 8472         LUMBAR   2010   [facility]              
 
              SPRAIN AND                EMERGENC[facility]              
 
    STRAIN               SERVICES              
 
                ASSOCIATES    
 
                   
 
           
 
         PLACE OF   2010   [place]              
 
              OCCURRENCE,               MEDICAL              
 
     HOME                IMAGING              
 
                ASSOCIATES    
 
                  
 
           
 
         FALL FROM   2010   [place]              
 
              OTHER                MEDICAL              
 
    SLIPPING           IMAGING              
 
  TRIPPING OR   ASSOCIATES    
 
   STUMBLING                
 
                       
 
           
 
 4660         ACUTE   10-   [facility],              
 
              BRONCHITIS                VINICIO W               
 
                                                 
 
                   
 
 5276         MUCOCELE OF  10-   [facility]              
 
               SALIVARY                PATHOLOGY              
 
    GLAND                LABORATORY              
 
                              
 
             
 
 45503        DENTAL   2009   [facility]ULL[facility],              
 
              CARIES                DANIEL W                
 
    EXTENDING                                   
 
  INTO PULP            
 
                
 
          
 
 5220         PULPITIS     2009   DANI[facility] MULLEN                
 
                                          
 
           
 
 48191        TOOTH   2009   [facility],              
 
              BROKEN FX                DANIEL COOPER                
 
    DUE TO                                   
 
  TRAUMA W/O           
 
  MENTION    
 
  COMP          
 
                
 
                                                                                
                                                                                
                                                                                
                                                                                
                                                                                
             
 
Medications
                      
 
 
 Na  ND  Rx  Da  Fi  Fi  Am  Da  Di  Ph  RX  Ph  St
 
 me  C   No  te  ll  ll  ou  ys  ag  ar   #  ys  at
 
         rm        s   nt      no  ma      ic  us
 
             Or  Da              si  cy      ia    
 
             de  te              s           n     
 
             re                                    
 
             d                                     
 
                                                   
 
                                                   
 
                                                   
 
                                                  
 
                                   
 
                           
 
                      
 
               
 
              
 
 MO  68      09  09      30  8           00  [place]  Ac
 
 OM  38      -0  -2      .0              00  L-  ti
 
 ET  20      4-  9-      00              07  MA  ve
 
 HA  04      20  20                      50  RT    
 
 ZI  10      17  17                      75       
 
 NE  1                                   29  PH    
 
                                            AR    
 
 25                                          MA    
 
                                            CY    
 
 MG                                             
 
                                      #5    
 
 TA                                    91 
 
 BL                                    
 
 ET                                    
 
                                
 
                           
 
                           
 
                           
 
                  
 
                  
 
                  
 
               
 
               
 
 HY  00      09  09      10  3           00  [place]  Ac
 
 DR  40      -0  -2      .0              00  L-  ti
 
 OC  60      4-  9-      00              02  MA  ve
 
 OD  12      20  20                      24  RT    
 
 ON  50      17  17                      17       
 
 -A  1                                   72  PH    
 
 CE                                          AR    
 
 TA                                          MA    
 
 MI                                          CY    
 
 NO                                             
 
 PH                                    #5    
 
 N                                     91 
 
 10                                    
 
 -3                                    
 
 25                             
 
                           
 
                           
 
                           
 
                  
 
                  
 
                  
 
               
 
               
 
               
 
 JAIME  53      09  09      20  10          00  [place]  Ac
 
 LF  74      -0  -2      .0              00  L-  ti
 
 AM  60      4-  9-      00              07  MA  ve
 
 ET  27      20  20                      50  RT    
 
 HO  20      17  17                      75       
 
 XA  5                                   30  PH    
 
 ZO                                          AR    
 
 LE                                          MA    
 
 -T                                          CY    
 
 MP                                              
 
                                      #5    
 
 DS                                    91 
 
                                      
 
 TA                                    
 
 BL                             
 
 ET                        
 
                           
 
                           
 
                  
 
                  
 
                  
 
               
 
               
 
               
 
               
 
 TR  57      09  09      6.  3           00  HO  Ac
 
 AM  66      -0  -2      00              00  ME  ti
 
 AD  40      6-  9-      0               04  TO  ve
 
 OL  37      20  20                      02  WN    
 
    71      17  17                      44       
 
 HC  8                                   70  PH    
 
 L                                           AR    
 
 50                                          MA    
 
                                            CY    
 
 MG                                             
 
                                      OF    
 
 TA                                     
 
 BL                                 CY 
 
 ET                                 NT 
 
                             HI 
 
                        AN 
 
                        A  
 
                           
 
                  
 
                  
 
                  
 
                  
 
                  
 
               
 
               
 
              
 
 CL  00      08  09      40  10          00  HO  Ac
 
 IN  59      -2  -2      .0              00  ME  ti
 
 DA  12      9-  2-      00              06  TO  ve
 
 MY  93      20  20                      09  WN    
 
 CI  20      17  17                      32       
 
 N   1                                   77  PH    
 
 HC                                          AR    
 
 L                                           MA    
 
 30                                          CY    
 
 0                                               
 
 MG                                    OF    
 
                                        
 
 CA                                 CY 
 
 PS                                 NT 
 
 UL                          HI 
 
 E                      AN 
 
                        A  
 
                           
 
                  
 
                  
 
                  
 
                  
 
                  
 
                  
 
                 
 
              
 
 OX  53      08  09      10  2           00  HO  Ac
 
 YC  74      -2  -2      .0              00  ME  ti
 
 OD  60      9-  2-      00              02  TO  ve
 
 ON  20      20  20                      01  WN    
 
 E-  30      17  17                      44       
 
 AC  5                                   93  PH    
 
 ET                                          AR    
 
 AM                                          MA    
 
 IN                                          CY    
 
 OP                                             
 
 HE                                    OF    
 
 N                                       
 
 5-                                 CY 
 
 32                                 NT 
 
 5                           HI 
 
                        AN 
 
                        A  
 
                           
 
                  
 
                  
 
                  
 
                  
 
                  
 
                 
 
               
 
              
 
 AL  00      03  05  5   90  30      EA  16  AR  Ac
 
 MO  78      -1  -1      .0          ST  82  NO  ti
 
 AZ  11      7-  7-      00          SI  20  LD  ve
 
 OL  08      20  20                  DE           
 
 AM  90      10  10                         RI    
 
  2  5                               PH      CH    
 
                                    AR      AR    
 
 MG                                  MA      D     
 
                                    CY      W     
 
 TA                                             
 
 BL                         OF            
 
 ET                                   
 
                         CY      
 
                         NT      
 
                  HI      
 
                AN      
 
                A       
 
                       
 
                  
 
                  
 
                  
 
               
 
               
 
               
 
               
 
              
 
     00      03  05  1   15  25      EA  16  GI  Ac
 
     59      -2  -1      0.          ST  98  LB  ti
 
     10      9-  7-      00          SI  21  ER  ve
 
     38      20  20      0           DE      T     
 
     50      10  10                         [facility]    
 
     1                               PH      HN    
 
                                     AR       W    
 
                                     MA            
 
                                     CY            
 
                                                
 
                            OF            
 
                                      
 
                           CY      
 
                         NT      
 
                  HI      
 
                AN      
 
                A    
 
                     
 
               
 
               
 
               
 
               
 
               
 
               
 
               
 
              
 
 AL  00      03  04  5   90  30      EA  16  AR  Ac
 
 MO  78      -1  -1      .0          ST  82  NO  ti
 
 AZ  11      7-  7-      00          SI  20  LD  ve
 
 OL  08      20  20                  DE           
 
 AM  90      10  10                         RI    
 
  2  5                               PH      CH    
 
                                    AR      AR    
 
 MG                                  MA      D     
 
                                    CY      W     
 
 TA                                             
 
 BL                         OF            
 
 ET                                   
 
                         CY      
 
                         NT      
 
                  HI      
 
                AN      
 
                A       
 
                       
 
                  
 
                  
 
                  
 
               
 
               
 
               
 
               
 
              
 
     59      10  04  11  8.  20      EA  14  AR  Ac
 
     31      -2  -1      50          ST  82  NO  ti
 
     00      3-  6-      0           SI  09  LD  ve
 
     57      20  20                  DE           
 
     92      09  10                         RI    
 
     0                               PH      CH    
 
                                     AR      AR    
 
                                     MA      D     
 
                                     CY      W     
 
                                                 
 
                            OF            
 
                                     
 
                         CY      
 
                         NT      
 
                  HI      
 
                AN      
 
                A       
 
                       
 
               
 
               
 
               
 
               
 
               
 
               
 
               
 
              
 
 TI  00      03  03  1   90  30      EA  16  GI  Ac
 
 ZA  18      -3  -3      .0          ST  99  LB  ti
 
 NI  54      0-  0-      00          SI  57  ER  ve
 
 DI  40      20  20                  DE      T     
 
 NE  02      10  10                         [place]    
 
    3                               PH      HN    
 
 HC                                  AR       W    
 
 L                                   MA            
 
 4                                   CY            
 
 MG                                             
 
                           OF            
 
 TA                                   
 
 BL                      CY      
 
 ET                      NT      
 
                  HI      
 
                AN      
 
                A    
 
                     
 
                  
 
                  
 
                  
 
                  
 
                  
 
               
 
               
 
              
 
     00      03  03  1   15  25      EA  16  GI  Ac
 
     59      -2  -2      0.          ST  98  LB  ti
 
     10      9-  9-      00          SI  21  ER  ve
 
     38      20  20      0           DE      T     
 
     50      10  10                         [place]    
 
     1                               PH      HN    
 
                                     AR       W    
 
                                     MA            
 
                                     CY            
 
                                                
 
                            OF            
 
                                      
 
                           CY      
 
                         NT      
 
                  HI      
 
                AN      
 
                A    
 
                     
 
               
 
               
 
               
 
               
 
               
 
               
 
               
 
              
 
 AL  00      03  03  5   90  30      EA  16  AR  Ac
 
 MO  78      -1  -1      .0          ST  82  NO  ti
 
 AZ  11      7-  7-      00          SI  20  LD  ve
 
 OL  08      20  20                  DE           
 
 AM  90      10  10                         RI    
 
  2  5                               PH      CH    
 
                                    AR      AR    
 
 MG                                  MA      D     
 
                                    CY      W     
 
 TA                                             
 
 BL                         OF            
 
 ET                                   
 
                         CY      
 
                         NT      
 
                  HI      
 
                AN      
 
                A       
 
                       
 
                  
 
                  
 
                  
 
               
 
               
 
               
 
               
 
              
 
 TI  00      03  03  0   60  30      EA  16  MI  Ac
 
 ZA  18      -0  -0      .0          ST  57  CK  ti
 
 NI  54      1-  1-      00          SI  54     ve
 
 DI  40      20  20                  DE      GR    
 
 NE  02      10  10                         EG    
 
    3                               PH      OR    
 
 HC                                  AR      Y     
 
 L                                   MA      E     
 
 4                                   CY            
 
 MG                                             
 
                           OF            
 
 TA                                   
 
 BL                      CY      
 
 ET                      NT      
 
                  HI      
 
                AN      
 
                A      
 
                     
 
                  
 
                  
 
                  
 
                  
 
                  
 
               
 
               
 
              
 
 LI  63      03  03  5   60  30      EA  16  MI  Ac
 
 DO  48      -0  -0      .0          ST  57  CK  ti
 
 DE  10      1-  1-      00          SI  56     ve
 
 RM  68      20  20                  DE      GR    
 
    70      10  10                         EG    
 
 5%  6                               PH      OR    
 
                                    AR      Y     
 
 PA                                  MA      E     
 
 TC                                  CY            
 
 H                                              
 
                            OF            
 
                                      
 
                         CY      
 
                         NT      
 
                  HI      
 
                AN      
 
                A      
 
                     
 
                 
 
               
 
               
 
               
 
               
 
               
 
               
 
              
 
 AL  67      02  02  00  90  30      EA  16  AR  Ac
 
 MO  25      -1  -2      .0          ST  43  NO  ti
 
 AZ  30      9-  6-      00          SI  25  LD  ve
 
 OL  90      20  20                  DE           
 
 AM  11      10  10                         RI    
 
    1                               PH      CH    
 
 0.                                  AR      AR    
 
 5                                   MA      D     
 
 MG                                  CY      W     
 
                                                
 
 TA                         OF            
 
 BL                         CY         
 
 ET                      NT      
 
                         HI      
 
                  AN      
 
                A       
 
                        
 
                       
 
                  
 
                  
 
                  
 
                  
 
               
 
               
 
              
 
     59      10  12  01  8.  15      EA  14  AR  Ac
 
     31      -2  -0      50          ST  82  NO  ti
 
     00      3-  3-      0           SI  09  LD  ve
 
     57      20  20                  DE           
 
     92      09  09                         RI    
 
     0                               PH      CH    
 
                                     AR      AR    
 
                                     MA      D     
 
                                     CY      W     
 
                                                 
 
                            OF            
 
                           CY         
 
                         NT      
 
                         HI      
 
                  AN      
 
                A       
 
                        
 
                       
 
               
 
               
 
               
 
               
 
               
 
               
 
              
 
     59      10  11  00  8.  15      EA  14  AR  Ac
 
     31      -2  -0      50          ST  82  NO  ti
 
     00      3-  5-      0           SI  09  LD  ve
 
     57      20  20                  DE           
 
     92      09  09                         RI    
 
     0                               PH      CH    
 
                                     AR      AR    
 
                                     MA      D     
 
                                     CY      W     
 
                                                 
 
                            OF            
 
                           CY         
 
                         NT      
 
                         HI      
 
                  AN      
 
                A       
 
                        
 
                       
 
               
 
               
 
               
 
               
 
               
 
               
 
              
 
 AZ  00      10  10  00  6.  6       EA  14  AR  Ac
 
 IT  09      -0  -2      00          ST  56  NO  ti
 
 HR  37      5-  2-      0           SI  82  LD  ve
 
 OM  14      20  20                  DE           
 
 YC  61      09  09                         RI    
 
 IN  8                               PH      CH    
 
                                    AR      AR    
 
 25                                  MA      D     
 
 0                                   CY      W     
 
 MG                                             
 
                           OF            
 
 TA                        CY         
 
 BL                      NT      
 
 ET                      HI      
 
                  AN      
 
                A       
 
                        
 
                       
 
                  
 
                  
 
                  
 
                  
 
                  
 
               
 
              
 
     00      10  10  00  60  20      EA  14  AR  Ac
 
     25      -0  -2      .0          ST  56  NO  ti
 
     83      5-  2-      00          SI  81  LD  ve
 
     67      20  20                  DE           
 
     80      09  09                         RI    
 
     1                               PH      CH    
 
                                     AR      AR    
 
                                     MA      D     
 
                                     CY      W     
 
                                                 
 
                            OF            
 
                            CY         
 
                         NT      
 
                         HI      
 
                  AN      
 
                A       
 
                        
 
                       
 
               
 
               
 
               
 
               
 
               
 
               
 
              
 
 IN  00      07  10  02  60  20      EA  13  MO  Ac
 
 DO  78      -1  -2      .0          ST  52  SE  ti
 
 ME  12      7-  2-      00          SI  24  S   ve
 
 TH  32      20  20                  DE      ST    
 
 AC  51      09  09                         EP    
 
 IN  0                               PH      HE    
 
                                    AR      N     
 
 25                                  MA      A     
 
                                    CY            
 
 MG                                              
 
                           OF            
 
 CA                         CY         
 
 PS                      NT      
 
 UL                      HI      
 
 E                AN      
 
                A       
 
                       
 
                     
 
                  
 
                  
 
                  
 
                  
 
                  
 
                 
 
              
 
 AM  00      09  10  00  15  5       EA  14  HE  Ac
 
 OX  78      -2  -0      .0          ST  43  ND  ti
 
 IC  12      5-  8-      00          SI  14  ER  ve
 
 IL  61      20  20                  DE      SO    
 
 LI  30      09  09                         N     
 
 N   5                               PH      RO    
 
 50                                  AR      BE    
 
 0                                   MA      RT    
 
 MG                                  CY       W    
 
                                               
 
 CA                         OF            
 
 PS                         CY         
 
 UL                      NT      
 
 E                       HI      
 
                  AN      
 
                A       
 
                        
 
                        
 
                  
 
                  
 
                  
 
                  
 
                 
 
               
 
              
 
     00      09  10  00  20  3       EA  14  HE  Ac
 
     05      -2  -0      .0          ST  43  ND  ti
 
     44      5-  8-      00          SI  13  ER  ve
 
     65      20  20                  DE      SO    
 
     02      09  09                         N     
 
     9                               PH      RO    
 
                                     AR      BE    
 
                                     MA      RT    
 
                                     CY       W    
 
                                                
 
                            OF            
 
                            CY         
 
                         NT      
 
                         HI      
 
                  AN      
 
                A       
 
                        
 
                        
 
               
 
               
 
               
 
               
 
               
 
               
 
              
 
 IN  00      07  09  01  60  20      EA  13  MO  Ac
 
 DO  78      -1  -1      .0          ST  52  SE  ti
 
 ME  12      7-  0-      00          SI  24  S   ve
 
 TH  32      20  20                  DE      ST    
 
 AC  51      09  09                         EP    
 
 IN  0                               PH      HE    
 
                                    AR      N     
 
 25                                  MA      A     
 
                                    CY            
 
 MG                                              
 
                           OF            
 
 CA                         CY         
 
 PS                      NT      
 
 UL                      HI      
 
 E                AN      
 
                A       
 
                       
 
                     
 
                  
 
                  
 
                  
 
                  
 
                  
 
                 
 
              
 
 IN  00      07  07  00  60  20      EA  13  MO  Ac
 
 DO  78      -1  -3      .0          ST  52  SE  ti
 
 ME  12      7-  0-      00          SI  24  S   ve
 
 TH  32      20  20                  DE      ST    
 
 AC  51      09  09                         EP    
 
 IN  0                               PH      HE    
 
                                    AR      N     
 
 25                                  MA      A     
 
                                    CY            
 
 MG                                              
 
                           OF            
 
 CA                         CY         
 
 PS                      NT      
 
 UL                      HI      
 
 E                AN      
 
                A       
 
                       
 
                     
 
                  
 
                  
 
                  
 
                  
 
                  
 
                 
 
              
 
 IN  00      06  07  01  30  10      EA  13  MO  Ac
 
 DO  78      -1  -1      .0          ST  15  SE  ti
 
 ME  12      6-  6-      00          SI  69  S   ve
 
 TH  32      20  20                  DE      ST    
 
 AC  51      09  09                         EP    
 
 IN  0                               PH      HE    
 
                                    AR      N     
 
 25                                  MA      A     
 
                                    CY            
 
 MG                                              
 
                           OF            
 
 CA                         CY         
 
 PS                      NT      
 
 UL                      HI      
 
 E                AN      
 
                A       
 
                       
 
                     
 
                  
 
                  
 
                  
 
                  
 
                  
 
                 
 
              
 
 IN  00      06  07  00  30  10      EA  13  MO  Ac
 
 DO  78      -1  -0      .0          ST  15  SE  ti
 
 ME  12      6-  2-      00          SI  69  S   ve
 
 TH  32      20  20                  DE      ST    
 
 AC  51      09  09                         EP    
 
 IN  0                               PH      HE    
 
                                    AR      N     
 
 25                                  MA      A     
 
                                    CY            
 
 MG                                              
 
                           OF            
 
 CA                         CY         
 
 PS                      NT      
 
 UL                      HI      
 
 E                AN      
 
                A       
 
                       
 
                     
 
                  
 
                  
 
                  
 
                  
 
                  
 
                 
 
              
 
     00      06  07  00  60  20      EA  13  MO  Ac
 
     55      -1  -0      .0          ST  15  SE  ti
 
     50      6-  2-      00          SI  70  S   ve
 
     16      20  20                  DE      ST    
 
     30      09  09                         EP    
 
     2                               PH      HE    
 
                                     AR      N     
 
                                     MA      A     
 
                                     CY            
 
                                                 
 
                            OF            
 
                            CY         
 
                         NT      
 
                         HI      
 
                  AN      
 
                A       
 
                       
 
                     
 
               
 
               
 
               
 
               
 
               
 
               
 
              
 
     00      06  06  00  15  3       EA  13  HE  Ac
 
     59      -0  -1      .0          ST  00  ND  ti
 
     10      4-  8-      00          SI  77  ER  ve
 
     38      20  20                  DE      SO    
 
     50      09  09                         N     
 
     1                               PH      RO    
 
                                     AR      BE    
 
                                     MA      RT    
 
                                     CY       W    
 
                                                
 
                            OF            
 
                            CY         
 
                         NT      
 
                         HI      
 
                  AN      
 
                A       
 
                        
 
                        
 
               
 
               
 
               
 
               
 
               
 
               
 
              
 
     00      05  05  00  20  3       EA  12  HE  Ac
 
     59      -0  -2      .0          ST  65  ND  ti
 
     10      7-  1-      00          SI  12  ER  ve
 
     38      20  20                  DE      SO    
 
     50      09  09                         N     
 
     1                               PH      RO    
 
                                     AR      BE    
 
                                     MA      RT    
 
                                     CY       W    
 
                                                
 
                            OF            
 
                            CY         
 
                         NT      
 
                         HI      
 
                  AN      
 
                A       
 
                        
 
                        
 
               
 
               
 
               
 
               
 
               
 
               
 
              
 
 MO  00      05  05  00  12  2       EA  12  HE  Ac
 
 OM  78      -0  -2      .0          ST  65  ND  ti
 
 ET  11      7-  1-      00          SI  13  ER  ve
 
 HA  83      20  20                  DE      SO    
 
 ZI  01      09  09                         N     
 
 NE  0                               PH      RO    
 
                                    AR      BE    
 
 25                                  MA      RT    
 
                                    CY       W    
 
 MG                                             
 
                           OF            
 
 TA                         CY         
 
 BL                      NT      
 
 ET                      HI      
 
                  AN      
 
                A       
 
                        
 
                        
 
                  
 
                  
 
                  
 
                  
 
                  
 
               
 
              
 
     00      05  05  00  15  3       EA  12  HE  Ac
 
     59      -0  -2      .0          ST  67  ND  ti
 
     10      8-  1-      00          SI  12  ER  ve
 
     54      20  20                  DE      SO    
 
     00      09  09                         N     
 
     1                               PH      RO    
 
                                     AR      BE    
 
                                     MA      RT    
 
                                     CY       W    
 
                                                
 
                            OF            
 
                            CY         
 
                         NT      
 
                         HI      
 
                  AN      
 
                A       
 
                        
 
                        
 
               
 
               
 
               
 
               
 
               
 
               
 
              
 
 AM  00      05  05  00  30  10      EA  12  ST  Ac
 
 OX  78      -0  -2      .0          ST  60  EP  ti
 
 IC  12      4-  1-      00          SI  38  HE  ve
 
 IL  61      20  20                  DE      NS    
 
 LI  30      09  09                              
 
 N   5                               PH      KE    
 
 50                                  AR      VI    
 
 0                                   MA      N     
 
 MG                                  CY      C     
 
                                                
 
 CA                         OF            
 
 PS                         CY         
 
 UL                      NT      
 
 E                       HI      
 
                  AN      
 
                A       
 
                        
 
                       
 
                  
 
                  
 
                  
 
                  
 
                 
 
               
 
              
 
     00      04  04  00  20  5       EA  12  ST  Ac
 
     17      -1  -2      .0          ST  31  EP  ti
 
     26      3-  3-      00          SI  07  HE  ve
 
     35      20  20                  DE      NS    
 
     97      09  09                              
 
     0                               PH      KE    
 
                                     AR      VI    
 
                                     MA      N     
 
                                     CY      C     
 
                                                
 
                            OF            
 
                            CY         
 
                         NT      
 
                         HI      
 
                  AN      
 
                A       
 
                        
 
                       
 
               
 
               
 
               
 
               
 
               
 
               
 
              
 
 AM  00      04  04  00  30  10      EA  12  ST  Ac
 
 OX  78      -0  -2      .0          ST  26  EP  ti
 
 IC  12      9-  3-      00          SI  87  HE  ve
 
 IL  61      20  20                  DE      NS    
 
 LI  30      09  09                              
 
 N   5                               PH      KE    
 
 50                                  AR      VI    
 
 0                                   MA      N     
 
 MG                                  CY      C     
 
                                                
 
 CA                         OF            
 
 PS                         CY         
 
 UL                      NT      
 
 E                       HI      
 
                  AN      
 
                A       
 
                        
 
                       
 
                  
 
                  
 
                  
 
                  
 
                 
 
               
 
              
 
     00      04  04  00  20  5       EA  12  ST  Ac
 
     60      -0  -2      .0          ST  26  EP  ti
 
     35      9-  3-      00          SI  86  HE  ve
 
     46      20  20                  DE      NS    
 
     82      09  09                              
 
     8                               PH      KE    
 
                                     AR      VI    
 
                                     MA      N     
 
                                     CY      C     
 
                                                
 
                            OF            
 
                            CY         
 
                         NT      
 
                         HI      
 
                  AN      
 
                A       
 
                        
 
                       
 
               
 
               
 
               
 
               
 
               
 
               
 
              
 
 CY  59      04  04  00  21  7       CL  19  WR  Ac
 
 CL  74      -1  -2      .0          IN  16  IG  ti
 
 OB  60      3-  3-      00          IC  91  HT  ve
 
 EN  17      20  20                              
 
 ZA  70      09  09                  PH      AR    
 
 MO  6                               AR      DY    
 
 IN                                  MA       C    
 
 E                                   CY            
 
 10                                                
 
                                                
 
 MG                                       
 
                                      
 
 TA                              
 
 BL                              
 
 ET                       
 
                        
 
                     
 
                  
 
               
 
               
 
               
 
               
 
               
 
               
 
     00      04  04  00  21  7       CL  19  WR  Ac
 
     55      -1  -2      .0          IN  16  IG  ti
 
     51      3-  3-      00          IC  92  HT  ve
 
     88      20  20                              
 
     30      09  09                  PH      AR    
 
     2                               AR      DY    
 
                                     MA       C    
 
                                     CY            
 
                                                   
 
                                                 
 
                                          
 
                                       
 
                                 
 
                                 
 
                          
 
                        
 
                     
 
                  
 
     68      04  04  00  20  10      CL  19  WR  Ac
 
     77      -1  -2      .0          IN  16  IG  ti
 
     40      3-  3-      00          IC  90  HT  ve
 
     12      20  20                              
 
     26      09  09                  PH      AR    
 
     0                               AR      DY    
 
                                     MA       C    
 
                                     CY            
 
                                                   
 
                                                  
 
                                          
 
                                       
 
                                 
 
                                 
 
                          
 
                        
 
                     
 
                  
 
                                                                                
                                                                                
                                                                                
                                                                                
                                                                                
                                                                                
  
 
Procedures
                      
 
 
 Procedure  DOS        Code       Location   Performer  Comment  
 
                                                                 
 
                                                 
 
 INCISION     54811      [facility]   RENUSCH             
 
 &   7                     PHYSICIAN                     
 
 DRAINAGE                     S, PLLC            
 
 ABSCESS                  
 
 COMPLICAT             
 
 ED/MULTIP     
 
 LE            
 
               
 
        
 
 HEPATITIS    64116      [facility]            
 
  C   5                     [facility]           
 
 ANTIBODY                     INC        INC       
 
                                     
 
                      
 
 CULTURE     93095      [facility]            
 
 BACTERIAL  5                     [facility]           
 
  BLOOD                     INC        INC       
 
 AEROBIC                            
 
 W/ID              
 
 ISOLATES      
 
               
 
              
 
 BLOOD     26958      [facility]            
 
 COUNT   5                     [facility]           
 
 COMPLETE                     INC        INC       
 
 AUTO&AUTO                           
 
  DIFRNTL              
 
 WBC           
 
               
 
         
 
 IV     92602      [facility]            
 
 INFUSION   5                     [facility]           
 
 THERAPY/P                    INC        INC       
 
 ROPHYLAXI                           
 
 S /DX 1ST             
 
  TO 1 HR      
 
               
 
              
 
 HEPATITIS    58568      [facility] MELENDREZ CORE   5                     [facility]           
 
 ANTIBODY                     INC        INC       
 
 HBCAB                            
 
 TOTAL                 
 
               
 
           
 
 HEPATITIS    04976      [facility] M[facility]ENDRE[facility] SURF   5                     [facility]           
 
 ANTIBODY                     INC        INC       
 
 HBSAB                               
 
                       
 
           
 
 [place]     [address]      [facility]            
 
 HEPATITIS  5                     [facility]           
 
  B                     INC        INC       
 
 SURFACE                            
 
 ANTIGEN               
 
               
 
             
 
 [place]     [address]      [facility]            
 
 LACTATE    5                     [facility]           
 
                              INC        INC       
 
                                   
 
             
 
 HEPATITIS    11712      [facility]            
 
  A   5                     [facility]           
 
 ANTIBODY                     INC        INC       
 
 HAAB                                
 
                       
 
          
 
 COMPREHEN    33199      [facility]            
 
 SIVE   5                     [facility]           
 
 METABOLIC                    INC        INC       
 
  PANEL                              
 
                       
 
            
 
 COMPREHEN  10-  60764      [facility]            
 
 SIVE   5                     [facility]           
 
 METABOLIC                    INC        INC       
 
  PANEL                              
 
                       
 
            
 
 COLLECTIO  10-  47155      [facility]            
 
 N VENOUS   5                     [facility]   [facility]           
 
 BLOOD                     INC        INC       
 
 VENIPUNCT                           
 
 URE                   
 
               
 
         
 
 [place]   10-  [address]      [facility]            
 
 THYROXINE  5                     [facility]           
 
  TOTAL                       INC        INC       
 
                                     
 
                    
 
 [place]   10-  [address]      [facility]            
 
 THYROID   5                     [facility]           
 
 STIMULATI                    INC        INC       
 
 NG                            
 
 HORMONE              
 
 TSH           
 
               
 
         
 
 BLOOD   10-  04696      [facility]            
 
 COUNT   5                     [facility]           
 
 COMPLETE                     INC        INC       
 
 AUTO&AUTO                           
 
  DIFRNTL              
 
 WBC           
 
               
 
         
 
 HEMOGLOBI  10-  12277      [facility]            
 
 N   5                     [facility]           
 
 GLYCOSYLA                    INC        INC       
 
 EVELINE A1C                             
 
                       
 
             
 
 BLOOD     10723      [facility]            
 
 COUNT   5                     [facility]           
 
 COMPLETE                     INC        INC       
 
 AUTO&AUTO                           
 
  DIFRNTL              
 
 WBC           
 
               
 
         
 
 HEPATITIS    31327      [facility]   [facility]            
 
  C   5                     [facility]           
 
 ANTIBODY                     INC        INC       
 
                                     
 
                      
 
 [place]     [address]      [facility]            
 
 HEPATITIS  5                     [facility]           
 
  B                     INC        INC       
 
 SURFACE                            
 
 ANTIGEN               
 
               
 
             
 
 HEPATITIS    49384      [facility]            
 
  B SURF   5                     [facility]           
 
 ANTIBODY                     INC        INC       
 
 HBSAB                               
 
                       
 
           
 
 PROTHROMB    27192      [facility]            
 
 IN TIME    5                     [facility]           
 
                              INC        INC       
 
                                   
 
             
 
 HEPATITIS    95560      [facility]            
 
  B CORE   5                     [facility]           
 
 ANTIBODY                     INC        INC       
 
 HBCAB                            
 
 TOTAL                 
 
               
 
           
 
 COLLECTIO    67078      [facility]            
 
 N VENOUS   5                     [facility]           
 
 BLOOD                     INC        INC       
 
 VENIPUNCT                           
 
 URE                   
 
               
 
         
 
 [place]     [address]      [facility]            
 
 THYROID   5                     [facility]           
 
 STIMULATI                    INC        INC       
 
 NG                            
 
 HORMONE              
 
 TSH           
 
               
 
         
 
 [place]     [address]      [facility]            
 
 AMMONIA    5                     [facility]           
 
                              INC        INC       
 
                                   
 
             
 
 THROMBOPL    57046      [facility]            
 
 ASTIN   5                     [facility]           
 
 TIME                     INC        INC       
 
 PARTIAL                            
 
 PLASMA/WH             
 
 OLE BLOOD     
 
               
 
               
 
 HEPATITIS    88466      [facility]            
 
  A   5                     [facility]           
 
 ANTIBODY                     INC        INC       
 
 HAAB                                
 
                       
 
          
 
 INF AGT           [facility]            
 
 AB DETECT  5                     [facility]           
 
  EIA TECH                    INC        INC       
 
                             
 
 HIV-1&/HI             
 
 V-2 SCR       
 
               
 
             
 
 COMPREHEN    18189      [facility]            
 
 SIVE   5                     [facility]           
 
 METABOLIC                    INC        INC       
 
  PANEL                              
 
                       
 
            
 
 [place]     [address]      [facility]            
 
 THYROXINE  5                     [facility]           
 
  TOTAL                       INC        INC       
 
                                     
 
                    
 
 RADEX     30019      [facility] ALL            
 
 SPINE   5                     MEDICAL                      
 
 LUMBOSACR                    IMAGING            
 
 AL 2/3       ASS        
 
 VIEWS                   
 
                   
 
           
 
 [place]     [address]      [place]   BOND ALL            
 
 HEAD/BRAI  5                     MEDICAL                      
 
 N W/O                     IMAGING            
 
 CONTRAST       ASS        
 
 MATERIAL                
 
                   
 
              
 
 RADIOLOGI    72232      [facility] ALL            
 
 C EXAM   5                     MEDICAL                      
 
 CHEST 2                     IMAGING            
 
 VIEWS       ASS        
 
 FRONTAL&L               
 
 ATERAL            
 
               
 
            
 
 INITIAL     62005      VÍCTOR RENEE SADI            
 
 INPATIENT  4                                                   
 
  CONSULT                                        
 
 NEW/ESTAB       
 
  PT 20      
 
 MIN           
 
               
 
         
 
 [place]     [address]1984      RAJESH   RAJESH            
 
 ABDOMEN &  4                     PAMELA        PAMELA                
 
  PELVIS                                          
 
 W/O                
 
 CONTRAST      
 
 MATERIAL      
 
               
 
              
 
 OPHTH     41258      FISH ARNOLD,            
 
 MEDICAL   0                     VISION     GENA M           
 
 XM&EVAL                                          
 
 COMPRHNSV                       
 
  ESTAB PT     
 
  1/>          
 
               
 
          
 
 TENS           [facility]   [facility]            
 
 DEVICE   0                                                   
 
 4/MORE                                        
 
 LEADS        
 
 MULTI      
 
 NERVE      
 
 STIMULATI     
 
 ON            
 
               
 
        
 
 APPLICATI    79909      [facility]            
 
 ON   0                     [facility]           
 
 MODALITY                     INC        INC       
 
 1/> AREAS                           
 
  HOT/COLD             
 
  PACKS        
 
               
 
            
 
 APPL     36174      [facility]            
 
 MODALITY   0                     [facility]           
 
 1/> AREAS                    INC        INC       
 
  ELEC                            
 
 STIMJ              
 
 UNATTENDE     
 
 D             
 
               
 
       
 
 THERAPEUT    17484      [facility]            
 
 IC PX 1/>  0                     [facility]           
 
  AREAS                     INC        INC       
 
 EACH 15                            
 
 MIN              
 
 EXERCISES     
 
               
 
               
 
 APPL     26101      [facility]            
 
 MODALITY   0                     [facility]           
 
 1/> AREAS                    INC        INC       
 
  ELEC                            
 
 STIMJ EA              
 
 15 MIN        
 
               
 
            
 
 THERAPEUT    69207      [facility]            
 
 IC PX 1/>  0                     [facility]           
 
  AREAS                     INC        INC       
 
 EACH 15                            
 
 MIN              
 
 EXERCISES     
 
               
 
               
 
 APPLICATI    35721      [facility]            
 
 ON   0                     [facility]           
 
 MODALITY                     INC        INC       
 
 1/> AREAS                           
 
  HOT/COLD             
 
  PACKS        
 
               
 
            
 
 APPL     03748      [facility]            
 
 MODALITY   0                     [facility]           
 
 1/> AREAS                    INC        INC       
 
  ELEC                            
 
 STIMJ              
 
 UNATTENDE     
 
 D             
 
               
 
       
 
 APPLICATI    89472      [facility]            
 
 ON   0                     [facility]           
 
 MODALITY                     INC        INC       
 
 1/> AREAS                           
 
  HOT/COLD             
 
  PACKS        
 
               
 
            
 
 APPL     14448      [facility]            
 
 MODALITY   0                     [facility]           
 
 1/> AREAS                    INC        INC       
 
  ELEC                            
 
 STIMJ              
 
 UNATTENDE     
 
 D             
 
               
 
       
 
 THERAPEUT    36391      [facility]            
 
 IC PX 1/>  0                     [facility]           
 
  AREAS                     INC        INC       
 
 EACH 15                            
 
 MIN              
 
 EXERCISES     
 
               
 
               
 
 PHYSICAL     25477      [facility]            
 
 THERAPY   0                     [facility]           
 
 EVALUATIO                    INC        INC       
 
 N                                   
 
                       
 
       
 
 PSYCHOLOG    56379      PHYSICIAN  VERNON THOMAS   0                     S   TASH BOYKIN          
 
 TESTING                     SERVICES             
 
 ADMN BY       [facility]                  
 
 TECH MO                
 
 HR                
 
               
 
        
 
 RADEX     48771      [place]   NETO,            
 
 SPINE   0                     MEDICAL   SHELLY P           
 
 LUMBOSACR                    IMAGING             
 
 AL       ASSOCIATE           
 
 MINIMUM 4    S          
 
  VIEWS                  
 
                 
 
            
 
 LEVEL III  10-  38136      ORAL   ORAL            
 
  SURG   9                     PATHOLOGY  PATHOLOGY          
 
 PATHOLOGY                         
 
        LABORATOR  LABORATOR 
 
 GROSS&[facility]NER    Y          Y         
 
 ROSCOPIC                          
 
 EXAM              
 
               
 
          
 
 DEEP           SEBAS MULLEN           
 
 SEDATION/  DANIEL Ivy [facility] W W         
 
 ANESTHESI                           
 
 A-1ST 30          
 
 MINUTES       
 
               
 
             
 
 THER     02311      SEBAS MULLEN           
 
 PROPH/DX   9                     DANIEL ROBERT           
 
 NJX IV                     W          W         
 
 PUSH                            
 
 SINGLE/1S         
 
 T      
 
 SBST/DRUG     
 
               
 
               
 
 BIOPSY OF    00919      SEBAS MULLEN           
 
  LIP       9                     , DANIEL SANCHEZ         
 
                                
 
         
 
 ORTHOPANT    73010      SEBAS MULLEN           
 
 OGRAM      9                     , DANIEL SANCHEZ         
 
                                 
 
         
 
 3D     12012      REYNA MENA,           
 
 RENDERING  9                      RAJESH ORELLANA           
 
  W/INTERP                                         
 
  &                           
 
 POSTPROCE     
 
 SS      
 
 SUPERVISI     
 
 ON            
 
               
 
        
 
 MRI     65896      REYNA C  RAJESH,           
 
 SPINAL   9                      RAJESH   REYNA           
 
 CANAL                                          
 
 LUMBAR                           
 
 W/O      
 
 CONTRAST      
 
 MATERIAL      
 
               
 
              
 
 RADEX     10831      [facility]   [facility]            
 
 SPINE   9                     [facility]           
 
 LUMBOSACR                    INC        INC       
 
 AL                            
 
 MINIMUM 4             
 
  VIEWS        
 
               
 
            
 
                                                                                
                                                                                
                                                                                
                                                                                
                                                                                
                                                                                
                                                                                
                                                                                
                              
 
Encounters
                      
 
 
 Encounter  Start   End Date   Code       Location   Performer
 
  Type      Date                                                 
 
                                                        
 
                
 
 EMERGENCY    18084      [facility] RAMESH  
 
    7          7                     PHYSICIAN           
 
 DEPARTMEN                               S, [facility]         
 
 T VISIT                    
 
 HIGH/URGE             
 
 NT      
 
 SEVERITY      
 
               
 
              
 
 HOSPITAL                [facility]            
 
 -   5          5                     [facility]            
 
 OUTPATIEN                                   INC                 
 
 T                                             
 
                   
 
       
 
 EMERGENCY    04099      [facility]            
 
    5          5                     [facility]                 
 
 T VISIT                            
 
 MODERATE          
 
 SEVERITY      
 
               
 
              
 
 EMERGENCY    69916      KAVITHA ROMERO 
 
    5          5                     PHYSICIAN  [facility]      
 
 DE[facility]MEN                               [facility]             
 
 T VISIT                      
 
 HIGH/URGE             
 
 NT      
 
 SEVERITY      
 
               
 
              
 
 HOSPITAL   10-  10-             [facility]            
 
 -   5          5                     [facility]            
 
 OUTPATIEN                                   INC                 
 
 T                                             
 
                   
 
       
 
 OFFICE     59657      [facility]   HEATHER 
 
 OUTPATITRENTON  5          5                     PHYSICIAN  [facility]      
 
 T VISIT                                S GROUP             
 
 15                      
 
 MINUTES               
 
               
 
             
 
 HOSPITAL                [facility]            
 
 -   5          5                     [facility]            
 
 OUTPATIEN                                   INC                 
 
 T                                             
 
                   
 
       
 
 OFFICE     95274      JOYCE COOK OUTPATIEN  0          0                     [facility]
 
 T VISIT                                                    
 
 15                            
 
 MINUTES       
 
               
 
             
 
 OFFICE     85832      JOYCE COOK OUTPATIEN  0          0                     [facility]
 
 T VISIT                                                    
 
 15                            
 
 MINUTES       
 
               
 
             
 
 OFFICE     63475      PHYSICIAN  VISHNU CRUZ  0          0                     [facility]   
 
 T VISIT                                SERVICES            
 
 25          PSC            
 
 MINUTES                 
 
                   
 
             
 
 OFFICE     16163      JOYCE COOK OUTPATIEN  0          0                     [facility]INICIO COOPER
 
 T VISIT                                                    
 
 15                            
 
 MINUTES       
 
               
 
             
 
 HOSPITAL                [facility]            
 
 -   0          0                     [facility]            
 
 OUTPATIEN                                   INC                 
 
 T                                             
 
                   
 
       
 
 OFFICE     25582      VISHNU MACIEL  0          0                     S   [facility] [place] [facility]                                SERVICES            
 
 MINUTES           [facility]               
 
                         
 
                 
 
 HOSPITAL                [facility]            
 
 -   0          0                     [facility]            
 
 OUTPATIEN                                   INC                 
 
 T                                             
 
                   
 
       
 
 EMERGENCY    15238      [facility]            
 
    0          0                     [facility]            
 
 DEPARTMEN                               INC                 
 
 T VISIT                            
 
 LOW/MODER         
 
  SEVERITY     
 
               
 
               
 
 EMERGENCY    36908      [facility]   [facility]ER, 
 
    0          0                     [facility]ERGENCY  [facility]
 
 [facility]                                [facility]           
 
 T VISIT                    
 
 HIGH/URGE     ASSOCIATE 
 
 NT      S         
 
 SEVERITY                
 
                 
 
              
 
 OFFICE   10-  10-  30141      JOYCE COOK, 
 
 OUTPATIEN  9          9                     [facility] COOPER  [facility]
 
 T NEW 30                                                    
 
 MINUTES                             
 
               
 
             
 
 OFFICE     23108      [place]   CONSUELO, 
 
 CONSULTAT  9          9                     MEDICAL   [facility]O ARELLANO                                SERV            
 
 NEW/ESTAB         FOUNDATIO         
 
  PATIENT                
 
 40 MIN                  
 
               
 
            
 
 OFFICE     93959      [facility]A REYES  9          9                     [facility]       
 
 T VISIT                                 PSC                
 
 15                    
 
 MINUTES            
 
               
 
             
 
 OFFICE     53931      ASA REYESPATITRENTON  9          9                     [facility]       
 
 T VISIT                                 PSC                
 
 15                    
 
 MINUTES            
 
               
 
             
 
 HOSPITAL                [facility]            
 
 -   9          9                     [facility]            
 
 OUTPATIEN                                   INC                 
 
 T                                             
 
                   
 
       
 
 OFFICE     29437      ASA REYESPATITRENTON  9          9                     [facility] VISIT                                 PSC                
 
 15                    
 
 MINUTES            
 
               
 
             
 
 [facility]                [facility]            
 
 -   9          9                     [facility]            
 
 OUTPATIEN                                   INC                 
 
 T                                             
 
                   
 
       
 
 OFFICE     65881      ASA REYES
 
 OUTPATITRENTON  9          9                     [facility]       
 
 T VISIT                                 PSC                
 
 15                    
 
 MINUTES

## 2017-10-07 NOTE — EXTERNAL MEDICAL SUMMARY RPT
Patient Summary
 Created on: 10/05/2017
 
BERYL MANN
External Reference #: [id number]
: 68
Sex: Female
 
Demographics
 
 
 
 Address  [address] [place]  [address]
 
 Preferred Language  English
 
 Marital Status  Unknown
 
 Protestant Affiliation  Unknown
 
 Race  Unknown
 
 Ethnic Group  Unknown
 
 
Author
 
 
 
 Author            ,            SAMIR DAWSON
 
 Address  Unknown
 
 Phone  [email or web address]
 
 
 
Care Team Providers
 
 
 
 Care Team Member Name  Role  Phone
 
 VINICIO COOK,   Unavailable  Unavailable
 
 VINICIO COOK   
 
 ELLIS ALL, ELLIS ALL   Unavailable  Unavailable
 
 [facility]   Un[facility]  Unavailable
 
 [facility]   
 
 RAJESH PAMELA,   Unavailable  Unavailable
 
 RAJESH PAMELA   
 
 RAJESH PAMELA,   Unavailable  Unavailable
 
 RAJESH PAMELA   
 
 RAJESH, REYNA,   Unavailable  Unavailable
 
 RAJESH, REYNA   
 
 [facility]  Unavailable  Unavailable
 
  [facility]  
 
    
 
 [facility]   Unavailable  Unavailable
 
 [place]   
 
 [facility] [place]  
 
    
 
 [facility]   Unavailable  Unavailable
 
 [place]  
 
  [facility] [place]  
 
    
 
 [facility]   Unavailable  Unavailable
 
 TREMAYNE BECKMANEY   Unavailable  Unavailable
 
 AB[facility]   
 
 PITO ROMERO,   Unavailable  Unavailable
 
 PITO ROMERO JOHN W,   Unavailable  Unavailable
 
 SHAHLA CRUZ   
 
 [facility]   Unavailable  Unavailable
 
 [facility]   
 
 [facility]   
 
 DANIEL MULLEN,   Unavailable  Unavailable
 
 DANIEL MULLEN   
 
 [facility],  Unavailable  Unavailable
 
  [facility]  
 
    
 
 [facility]   Unavailable  Unavailable
 
 [facility], [facility]   
 
 VÍCTOR GRAVES   Unavailable  Unavailable
 
 VÍCTOR GRAVES   Unavailable  Unavailable
 
 TASH THOMAS,   Unavailable  Unavailable
 
 TASH THOMAS EMMETT P,   Unavailable  Unavailable
 
 SHELLY DUQUE   
 
 [facility]   Unavailable  Unavailable
 
 [facility]   
 
 [facility]   
 
 [facility]   Unavailable  [facility]available
 
 [facility]   
 
 [facility]   
 
 RENUSCH, RENUSCH   Unavailable  Unavailable
 
 GENA ARNOLD,   Unavailable  Unavailable
 
 GENA ARNOLD PHILLIP A,   Unavailable  Unavailable
 
 ÁLVARO CORDERO A C, WRIGHT,   Unavailable  Unavailable
 
 ASA C   
 
                                            
 
Purpose
                      Continuity of Care Document - 2009 through 10-05-
2017                                                                            
                     
 
Problems
                      
 
 
 Code         Diagnosis    DOS          Provider     Status     
 
                                                               
 
               
 
 [id number]       CUTANEOUS   2017   [facility]              
 
              ABSCESS OF                PHYSICIANS,             
 
      RIGHT UPPER           PLLC                   
 
   LIMB                       
 
                  
 
      
 
 D649         ANEMIA   2015   [facility]              
 
              UNSPECIFIED               PHYSICIANS,             
 
                            PLLC                   
 
                          
 
      
 
 I10          ESSENTIAL   [facility]              
 
      HYPERTENSIO          INC                     
 
  N                          
 
             
 
 [id number]       CELLULITIS   2015   [facility]              
 
              OF RIGHT                PHYSICIANS,             
 
      UPPER LIMB            PLLC                   
 
                              
 
             
 
 R945         ABNORMAL   2015   [facility]              
 
              RESULTS OF                [facility]              
 
      LIVER           INC                     
 
  FUNCTION                 
 
  STUDIES       
 
                
 
        
 
 Z720         TOBACCO USE  2015   [facility]              
 
                                        [facility]              
 
                       INC                     
 
                 
 
 [id number]       UNSPECIFIED  10-   [facility]              
 
               ASTHMA                [facility]              
 
      UNCOMPLICAT          INC                     
 
  ED                         
 
              
 
 91251        ASTHMA,   2015   [facility]              
 
              UNSPECIFIED               PHYSICIANS              
 
      ,           GROUP                   
 
  UNSPECIFIED                 
 
   STATUS         
 
                
 
        
 
 40423        OTHER   2015   [facility]              
 
              MALAISE AND               PHYSICIANS              
 
       FATIGUE             GROUP                   
 
                              
 
           
 
 7905         OTHER   2015   [facility]              
 
              NONSPECIFIC               PHYSICIANS              
 
       ABNORMAL           GROUP                   
 
  SERUM                  
 
  ENZYME      
 
  LEVELS        
 
                
 
       
 
 51915        DEGEN   2015   [place]              
 
              LUMBAR/LUMB               MEDICAL              
 
      OSACRAL           IMAGING ASS             
 
  INTERVERTEB                 
 
  RAL DISC              
 
                
 
         
 
 7245         UNSPECIFIED  2015   [place]              
 
               BACKACHE                 MEDICAL              
 
                           IMAGING ASS             
 
                        
 
            
 
 7804         DIZZINESS   2015   [place]              
 
              AND                MEDICAL              
 
      GIDDINESS            IMAGING ASS             
 
                              
 
                  
 
 88080        SHORTNESS   2015   [place]              
 
              OF BREATH                 MEDICAL              
 
                           IMAGING ASS             
 
                        
 
            
 
 2859         UNSPECIFIED  2014   [facility]                
 
               ANEMIA                                           
 
                                          
 
          
 
 6202         OTHER AND   2014   [facility]              
 
              UNSPECIFIED               PAMELA                     
 
     OVARIAN                                  
 
  CYST            
 
                
 
 3384         CHRONIC   2010   [facility],              
 
              PAIN                VINICOI W               
 
    SYNDROME                                     
 
                        
 
         
 
 V154         PERS HX   2010   DEPT FOR              
 
              PSYCHOLOGIC               PUBLIC HLTH             
 
    AL TRAUMA                                   
 
  PRS HAZARDS             
 
   HEALTH       
 
                
 
        
 
 96760        REGULAR   2010   [facility]              
 
              ASTIGMATISM               VISION                  
 
                                                 
 
                 
 
 72934        UNSPECIFIED  2010   [facility]ANS              
 
                              SERVICES              
 
    ARTHROPATHY          PSC                     
 
   OTHER                 
 
  SPECIFIED    
 
  SITES         
 
                
 
      
 
 04718        DISPLCMT   2010   [facility]              
 
              LUMBAR                SERVICES              
 
    INTERVERT           PSC                     
 
  DISC W/O                 
 
  MYELOPATHY    
 
                
 
           
 
 7242         LUMBAGO      2010   [facility]ANS              
 
                                        SERVICES              
 
                 PSC                     
 
                 
 
 31189        SPINAL STEN  2010   [facility]              
 
               LUMB REG                [facility]              
 
    W/O           INC                     
 
  NEUROGENIC                 
 
  CLAUDICATIO   
 
  N             
 
             
 
 V571         OTHER   2010   [place]              
 
              PHYSICAL                [facility]              
 
    THERAPY              INC                     
 
                             
 
        
 
 97543        CONTUSION   2010   [place]              
 
              OF BACK                   [facility]              
 
                         INC                     
 
                     
 
 8472         LUMBAR   2010   [place]              
 
              SPRAIN AND                EMERGENCY              
 
    STRAIN               SERVICES              
 
                ASSOCIATES    
 
                   
 
           
 
         PLACE OF   2010   [place]              
 
              OCCURRENCE,               MEDICAL              
 
     HOME                IMAGING              
 
                ASSOCIATES    
 
                  
 
           
 
         FALL FROM   2010   [place]              
 
              OTHER                MEDICAL              
 
    SLIPPING           IMAGING              
 
  TRIPPING OR   ASSOCIATES    
 
   STUMBLING                
 
                       
 
           
 
 4660         ACUTE   10-   [facility],              
 
              BRONCHITIS                VINICIO W               
 
                                                 
 
                   
 
 5276         MUCOCELE OF  10-   [facility]              
 
               SALIVARY                PATHOLOGY              
 
    GLAND                LABORATORY              
 
                              
 
             
 
 61704        DENTAL   2009   MULLEN,              
 
              CARIES                DANIEL W                
 
    EXTENDING                                   
 
  INTO PULP            
 
                
 
          
 
 5220         PULPITIS     2009   DANIEL MULLEN                
 
                                          
 
           
 
 42726        TOOTH   2009   [facility],              
 
              BROKEN FX                DANIEL COOPER                
 
    DUE TO                                   
 
  TRAUMA W/O           
 
  MENTION    
 
  COMP          
 
                
 
                                                                                
                                                                                
                                                                                
                                                                                
                                                                                
             
 
Medications
                      
 
 
 Na  ND  Rx  Da  Fi  Fi  Am  Da  Di  Ph  RX  Ph  St
 
 me  C   No  te  ll  ll  ou  ys  ag  ar   #  ys  at
 
         rm        s   nt      no  ma      ic  us
 
             Or  Da              si  cy      ia    
 
             de  te              s           n     
 
             re                                    
 
             d                                     
 
                                                   
 
                                                   
 
                                                   
 
                                                  
 
                                   
 
                           
 
                      
 
               
 
              
 
 VT  68      09  09      30  8           00  [place]  Ac
 
 OM  38      -0  -2      .0              00  L-  ti
 
 ET  20      4-  9-      00              07  MA  ve
 
 HA  04      20  20                      50  RT    
 
 ZI  10      17  17                      75       
 
 NE  1                                   29  PH    
 
                                            AR    
 
 25                                          MA    
 
                                            CY    
 
 MG                                             
 
                                      #5    
 
 TA                                    91 
 
 BL                                    
 
 ET                                    
 
                                
 
                           
 
                           
 
                           
 
                  
 
                  
 
                  
 
               
 
               
 
 HY  00      09  09      10  3           00  [place]  Ac
 
 DR  40      -0  -2      .0              00  L-  ti
 
 OC  60      4-  9-      00              02  MA  ve
 
 OD  12      20  20                      24  RT    
 
 ON  50      17  17                      17       
 
 -A  1                                   72  PH    
 
 CE                                          AR    
 
 TA                                          MA    
 
 MI                                          CY    
 
 NO                                             
 
 PH                                    #5    
 
 N                                     91 
 
 10                                    
 
 -3                                    
 
 25                             
 
                           
 
                           
 
                           
 
                  
 
                  
 
                  
 
               
 
               
 
               
 
 JAIME  53      09  09      20  10          00  [place]  Ac
 
 LF  74      -0  -2      .0              00  L-  ti
 
 AM  60      4-  9-      00              07  MA  ve
 
 ET  27      20  20                      50  RT    
 
 HO  20      17  17                      75       
 
 XA  5                                   30  PH    
 
 ZO                                          AR    
 
 LE                                          MA    
 
 -T                                          CY    
 
 MP                                              
 
                                      #5    
 
 DS                                    91 
 
                                      
 
 TA                                    
 
 BL                             
 
 ET                        
 
                           
 
                           
 
                  
 
                  
 
                  
 
               
 
               
 
               
 
               
 
 TR  57      09  09      6.  3           00  HO  Ac
 
 AM  66      -0  -2      00              00  ME  ti
 
 AD  40      6-  9-      0               04  TO  ve
 
 OL  37      20  20                      02  WN    
 
    71      17  17                      44       
 
 HC  8                                   70  PH    
 
 L                                           AR    
 
 50                                          MA    
 
                                            CY    
 
 MG                                             
 
                                      OF    
 
 TA                                     
 
 BL                                 CY 
 
 ET                                 NT 
 
                             HI 
 
                        AN 
 
                        A  
 
                           
 
                  
 
                  
 
                  
 
                  
 
                  
 
               
 
               
 
              
 
 CL  00      08  09      40  10          00  HO  Ac
 
 IN  59      -2  -2      .0              00  ME  ti
 
 DA  12      9-  2-      00              06  TO  ve
 
 MY  93      20  20                      09  WN    
 
 CI  20      17  17                      32       
 
 N   1                                   77  PH    
 
 HC                                          AR    
 
 L                                           MA    
 
 30                                          CY    
 
 0                                               
 
 MG                                    OF    
 
                                        
 
 CA                                 CY 
 
 PS                                 NT 
 
 UL                          HI 
 
 E                      AN 
 
                        A  
 
                           
 
                  
 
                  
 
                  
 
                  
 
                  
 
                  
 
                 
 
              
 
 OX  53      08  09      10  2           00  HO  Ac
 
 YC  74      -2  -2      .0              00  ME  ti
 
 OD  60      9-  2-      00              02  TO  ve
 
 ON  20      20  20                      01  WN    
 
 E-  30      17  17                      44       
 
 AC  5                                   93  PH    
 
 ET                                          AR    
 
 AM                                          MA    
 
 IN                                          CY    
 
 OP                                             
 
 HE                                    OF    
 
 N                                       
 
 5-                                 CY 
 
 32                                 NT 
 
 5                           HI 
 
                        AN 
 
                        A  
 
                           
 
                  
 
                  
 
                  
 
                  
 
                  
 
                 
 
               
 
              
 
 AL  00      03  05  5   90  30      EA  16  AR  Ac
 
 VT  78      -1  -1      .0          ST  82  NO  ti
 
 AZ  11      7-  7-      00          SI  20  LD  ve
 
 OL  08      20  20                  DE           
 
 AM  90      10  10                         RI    
 
  2  5                               PH      CH    
 
                                    AR      AR    
 
 MG                                  MA      D     
 
                                    CY      W     
 
 TA                                             
 
 BL                         OF            
 
 ET                                   
 
                         CY      
 
                         NT      
 
                  HI      
 
                AN      
 
                A       
 
                       
 
                  
 
                  
 
                  
 
               
 
               
 
               
 
               
 
              
 
     00      03  05  1   15  25      EA  16  GI  Ac
 
     59      -2  -1      0.          ST  98  LB  ti
 
     10      9-  7-      00          SI  21  ER  ve
 
     38      20  20      0           DE      T     
 
     50      10  10                         [place]    
 
     1                               PH      HN    
 
                                     AR       W    
 
                                     MA            
 
                                     CY            
 
                                                
 
                            OF            
 
                                      
 
                           CY      
 
                         NT      
 
                  HI      
 
                AN      
 
                A    
 
                     
 
               
 
               
 
               
 
               
 
               
 
               
 
               
 
              
 
 AL  00      03  04  5   90  30      EA  16  AR  Ac
 
 VT  78      -1  -1      .0          ST  82  NO  ti
 
 AZ  11      7-  7-      00          SI  20  LD  ve
 
 OL  08      20  20                  DE           
 
 AM  90      10  10                         RI    
 
  2  5                               PH      CH    
 
                                    AR      AR    
 
 MG                                  MA      D     
 
                                    CY      W     
 
 TA                                             
 
 BL                         OF            
 
 ET                                   
 
                         CY      
 
                         NT      
 
                  HI      
 
                AN      
 
                A       
 
                       
 
                  
 
                  
 
                  
 
               
 
               
 
               
 
               
 
              
 
     59      10  04  11  8.  20      EA  14  AR  Ac
 
     31      -2  -1      50          ST  82  NO  ti
 
     00      3-  6-      0           SI  09  LD  ve
 
     57      20  20                  DE           
 
     92      09  10                         RI    
 
     0                               PH      CH    
 
                                     AR      AR    
 
                                     MA      D     
 
                                     CY      W     
 
                                                 
 
                            OF            
 
                                     
 
                         CY      
 
                         NT      
 
                  HI      
 
                AN      
 
                A       
 
                       
 
               
 
               
 
               
 
               
 
               
 
               
 
               
 
              
 
 TI  00      03  03  1   90  30      EA  16  GI  Ac
 
 ZA  18      -3  -3      .0          ST  99  LB  ti
 
 NI  54      0-  0-      00          SI  57  ER  ve
 
 DI  40      20  20                  DE      T     
 
 NE  02      10  10                         [place]    
 
    3                               PH      HN    
 
 HC                                  AR       W    
 
 L                                   MA            
 
 4                                   CY            
 
 MG                                             
 
                           OF            
 
 TA                                   
 
 BL                      CY      
 
 ET                      NT      
 
                  HI      
 
                AN      
 
                A    
 
                     
 
                  
 
                  
 
                  
 
                  
 
                  
 
               
 
               
 
              
 
     00      03  03  1   15  25      EA  16  GI  Ac
 
     59      -2  -2      0.          ST  98  LB  ti
 
     10      9-  9-      00          SI  21  ER  ve
 
     38      20  20      0           DE      T     
 
     50      10  10                         [place]    
 
     1                               PH      HN    
 
                                     AR       W    
 
                                     MA            
 
                                     CY            
 
                                                
 
                            OF            
 
                                      
 
                           CY      
 
                         NT      
 
                  HI      
 
                AN      
 
                A    
 
                     
 
               
 
               
 
               
 
               
 
               
 
               
 
               
 
              
 
 AL  00      03  03  5   90  30      EA  16  AR  Ac
 
 VT  78      -1  -1      .0          ST  82  NO  ti
 
 AZ  11      7-  7-      00          SI  20  LD  ve
 
 OL  08      20  20                  DE           
 
 AM  90      10  10                         RI    
 
  2  5                               PH      CH    
 
                                    AR      AR    
 
 MG                                  MA      D     
 
                                    CY      W     
 
 TA                                             
 
 BL                         OF            
 
 ET                                   
 
                         CY      
 
                         NT      
 
                  HI      
 
                AN      
 
                A       
 
                       
 
                  
 
                  
 
                  
 
               
 
               
 
               
 
               
 
              
 
 TI  00      03  03  0   60  30      EA  16  MI  Ac
 
 ZA  18      -0  -0      .0          ST  57  CK  ti
 
 NI  54      1-  1-      00          SI  54     ve
 
 DI  40      20  20                  DE      GR    
 
 NE  02      10  10                         EG    
 
    3                               PH      OR    
 
 HC                                  AR      Y     
 
 L                                   MA      E     
 
 4                                   CY            
 
 MG                                             
 
                           OF            
 
 TA                                   
 
 BL                      CY      
 
 ET                      NT      
 
                  HI      
 
                AN      
 
                A      
 
                     
 
                  
 
                  
 
                  
 
                  
 
                  
 
               
 
               
 
              
 
 LI  63      03  03  5   60  30      EA  16  MI  Ac
 
 DO  48      -0  -0      .0          ST  57  CK  ti
 
 DE  10      1-  1-      00          SI  56     ve
 
 RM  68      20  20                  DE      GR    
 
    70      10  10                         EG    
 
 5%  6                               PH      OR    
 
                                    AR      Y     
 
 PA                                  MA      E     
 
 TC                                  CY            
 
 H                                              
 
                            OF            
 
                                      
 
                         CY      
 
                         NT      
 
                  HI      
 
                AN      
 
                A      
 
                     
 
                 
 
               
 
               
 
               
 
               
 
               
 
               
 
              
 
 AL  67      02  02  00  90  30      EA  16  AR  Ac
 
 VT  25      -1  -2      .0          ST  43  NO  ti
 
 AZ  30      9-  6-      00          SI  25  LD  ve
 
 OL  90      20  20                  DE           
 
 AM  11      10  10                         RI    
 
    1                               PH      CH    
 
 0.                                  AR      AR    
 
 5                                   MA      D     
 
 MG                                  CY      W     
 
                                                
 
 TA                         OF            
 
 BL                         CY         
 
 ET                      NT      
 
                         HI      
 
                  AN      
 
                A       
 
                        
 
                       
 
                  
 
                  
 
                  
 
                  
 
               
 
               
 
              
 
     59      10  12  01  8.  15      EA  14  AR  Ac
 
     31      -2  -0      50          ST  82  NO  ti
 
     00      3-  3-      0           SI  09  LD  ve
 
     57      20  20                  DE           
 
     92      09  09                         RI    
 
     0                               PH      CH    
 
                                     AR      AR    
 
                                     MA      D     
 
                                     CY      W     
 
                                                 
 
                            OF            
 
                           CY         
 
                         NT      
 
                         HI      
 
                  AN      
 
                A       
 
                        
 
                       
 
               
 
               
 
               
 
               
 
               
 
               
 
              
 
     59      10  11  00  8.  15      EA  14  AR  Ac
 
     31      -2  -0      50          ST  82  NO  ti
 
     00      3-  5-      0           SI  09  LD  ve
 
     57      20  20                  DE           
 
     92      09  09                         RI    
 
     0                               PH      CH    
 
                                     AR      AR    
 
                                     MA      D     
 
                                     CY      W     
 
                                                 
 
                            OF            
 
                           CY         
 
                         NT      
 
                         HI      
 
                  AN      
 
                A       
 
                        
 
                       
 
               
 
               
 
               
 
               
 
               
 
               
 
              
 
 AZ  00      10  10  00  6.  6       EA  14  AR  Ac
 
 IT  09      -0  -2      00          ST  56  NO  ti
 
 HR  37      5-  2-      0           SI  82  LD  ve
 
 OM  14      20  20                  DE           
 
 YC  61      09  09                         RI    
 
 IN  8                               PH      CH    
 
                                    AR      AR    
 
 25                                  MA      D     
 
 0                                   CY      W     
 
 MG                                             
 
                           OF            
 
 TA                        CY         
 
 BL                      NT      
 
 ET                      HI      
 
                  AN      
 
                A       
 
                        
 
                       
 
                  
 
                  
 
                  
 
                  
 
                  
 
               
 
              
 
     00      10  10  00  60  20      EA  14  AR  Ac
 
     25      -0  -2      .0          ST  56  NO  ti
 
     83      5-  2-      00          SI  81  LD  ve
 
     67      20  20                  DE           
 
     80      09  09                         RI    
 
     1                               PH      CH    
 
                                     AR      AR    
 
                                     MA      D     
 
                                     CY      W     
 
                                                 
 
                            OF            
 
                            CY         
 
                         NT      
 
                         HI      
 
                  AN      
 
                A       
 
                        
 
                       
 
               
 
               
 
               
 
               
 
               
 
               
 
              
 
 IN  00      07  10  02  60  20      EA  13  MO  Ac
 
 DO  78      -1  -2      .0          ST  52  SE  ti
 
 ME  12      7-  2-      00          SI  24  S   ve
 
 TH  32      20  20                  DE      ST    
 
 AC  51      09  09                         EP    
 
 IN  0                               PH      HE    
 
                                    AR      N     
 
 25                                  MA      A     
 
                                    CY            
 
 MG                                              
 
                           OF            
 
 CA                         CY         
 
 PS                      NT      
 
 UL                      HI      
 
 E                AN      
 
                A       
 
                       
 
                     
 
                  
 
                  
 
                  
 
                  
 
                  
 
                 
 
              
 
 AM  00      09  10  00  15  5       EA  14  HE  Ac
 
 OX  78      -2  -0      .0          ST  43  ND  ti
 
 IC  12      5-  8-      00          SI  14  ER  ve
 
 IL  61      20  20                  DE      SO    
 
 LI  30      09  09                         N     
 
 N   5                               PH      RO    
 
 50                                  AR      BE    
 
 0                                   MA      RT    
 
 MG                                  CY       W    
 
                                               
 
 CA                         OF            
 
 PS                         CY         
 
 UL                      NT      
 
 E                       HI      
 
                  AN      
 
                A       
 
                        
 
                        
 
                  
 
                  
 
                  
 
                  
 
                 
 
               
 
              
 
     00      09  10  00  20  3       EA  14  HE  Ac
 
     05      -2  -0      .0          ST  43  ND  ti
 
     44      5-  8-      00          SI  13  ER  ve
 
     65      20  20                  DE      SO    
 
     02      09  09                         N     
 
     9                               PH      RO    
 
                                     AR      BE    
 
                                     MA      RT    
 
                                     CY       W    
 
                                                
 
                            OF            
 
                            CY         
 
                         NT      
 
                         HI      
 
                  AN      
 
                A       
 
                        
 
                        
 
               
 
               
 
               
 
               
 
               
 
               
 
              
 
 IN  00      07  09  01  60  20      EA  13  MO  Ac
 
 DO  78      -1  -1      .0          ST  52  SE  ti
 
 ME  12      7-  0-      00          SI  24  S   ve
 
 TH  32      20  20                  DE      ST    
 
 AC  51      09  09                         EP    
 
 IN  0                               PH      HE    
 
                                    AR      N     
 
 25                                  MA      A     
 
                                    CY            
 
 MG                                              
 
                           OF            
 
 CA                         CY         
 
 PS                      NT      
 
 UL                      HI      
 
 E                AN      
 
                A       
 
                       
 
                     
 
                  
 
                  
 
                  
 
                  
 
                  
 
                 
 
              
 
 IN  00      07  07  00  60  20      EA  13  MO  Ac
 
 DO  78      -1  -3      .0          ST  52  SE  ti
 
 ME  12      7-  0-      00          SI  24  S   ve
 
 TH  32      20  20                  DE      ST    
 
 AC  51      09  09                         EP    
 
 IN  0                               PH      HE    
 
                                    AR      N     
 
 25                                  MA      A     
 
                                    CY            
 
 MG                                              
 
                           OF            
 
 CA                         CY         
 
 PS                      NT      
 
 UL                      HI      
 
 E                AN      
 
                A       
 
                       
 
                     
 
                  
 
                  
 
                  
 
                  
 
                  
 
                 
 
              
 
 IN  00      06  07  01  30  10      EA  13  MO  Ac
 
 DO  78      -1  -1      .0          ST  15  SE  ti
 
 ME  12      6-  6-      00          SI  69  S   ve
 
 TH  32      20  20                  DE      ST    
 
 AC  51      09  09                         EP    
 
 IN  0                               PH      HE    
 
                                    AR      N     
 
 25                                  MA      A     
 
                                    CY            
 
 MG                                              
 
                           OF            
 
 CA                         CY         
 
 PS                      NT      
 
 UL                      HI      
 
 E                AN      
 
                A       
 
                       
 
                     
 
                  
 
                  
 
                  
 
                  
 
                  
 
                 
 
              
 
 IN  00      06  07  00  30  10      EA  13  MO  Ac
 
 DO  78      -1  -0      .0          ST  15  SE  ti
 
 ME  12      6-  2-      00          SI  69  S   ve
 
 TH  32      20  20                  DE      ST    
 
 AC  51      09  09                         EP    
 
 IN  0                               PH      HE    
 
                                    AR      N     
 
 25                                  MA      A     
 
                                    CY            
 
 MG                                              
 
                           OF            
 
 CA                         CY         
 
 PS                      NT      
 
 UL                      HI      
 
 E                AN      
 
                A       
 
                       
 
                     
 
                  
 
                  
 
                  
 
                  
 
                  
 
                 
 
              
 
     00      06  07  00  60  20      EA  13  MO  Ac
 
     55      -1  -0      .0          ST  15  SE  ti
 
     50      6-  2-      00          SI  70  S   ve
 
     16      20  20                  DE      ST    
 
     30      09  09                         EP    
 
     2                               PH      HE    
 
                                     AR      N     
 
                                     MA      A     
 
                                     CY            
 
                                                 
 
                            OF            
 
                            CY         
 
                         NT      
 
                         HI      
 
                  AN      
 
                A       
 
                       
 
                     
 
               
 
               
 
               
 
               
 
               
 
               
 
              
 
     00      06  06  00  15  3       EA  13  HE  Ac
 
     59      -0  -1      .0          ST  00  ND  ti
 
     10      4-  8-      00          SI  77  ER  ve
 
     38      20  20                  DE      SO    
 
     50      09  09                         N     
 
     1                               PH      RO    
 
                                     AR      BE    
 
                                     MA      RT    
 
                                     CY       W    
 
                                                
 
                            OF            
 
                            CY         
 
                         NT      
 
                         HI      
 
                  AN      
 
                A       
 
                        
 
                        
 
               
 
               
 
               
 
               
 
               
 
               
 
              
 
     00      05  05  00  20  3       EA  12  HE  Ac
 
     59      -0  -2      .0          ST  65  ND  ti
 
     10      7-  1-      00          SI  12  ER  ve
 
     38      20  20                  DE      SO    
 
     50      09  09                         N     
 
     1                               PH      RO    
 
                                     AR      BE    
 
                                     MA      RT    
 
                                     CY       W    
 
                                                
 
                            OF            
 
                            CY         
 
                         NT      
 
                         HI      
 
                  AN      
 
                A       
 
                        
 
                        
 
               
 
               
 
               
 
               
 
               
 
               
 
              
 
 VT  00      05  05  00  12  2       EA  12  HE  Ac
 
 OM  78      -0  -2      .0          ST  65  ND  ti
 
 ET  11      7-  1-      00          SI  13  ER  ve
 
 HA  83      20  20                  DE      SO    
 
 ZI  01      09  09                         N     
 
 NE  0                               PH      RO    
 
                                    AR      BE    
 
 25                                  MA      RT    
 
                                    CY       W    
 
 MG                                             
 
                           OF            
 
 TA                         CY         
 
 BL                      NT      
 
 ET                      HI      
 
                  AN      
 
                A       
 
                        
 
                        
 
                  
 
                  
 
                  
 
                  
 
                  
 
               
 
              
 
     00      05  05  00  15  3       EA  12  HE  Ac
 
     59      -0  -2      .0          ST  67  ND  ti
 
     10      8-  1-      00          SI  12  ER  ve
 
     54      20  20                  DE      SO    
 
     00      09  09                         N     
 
     1                               PH      RO    
 
                                     AR      BE    
 
                                     MA      RT    
 
                                     CY       W    
 
                                                
 
                            OF            
 
                            CY         
 
                         NT      
 
                         HI      
 
                  AN      
 
                A       
 
                        
 
                        
 
               
 
               
 
               
 
               
 
               
 
               
 
              
 
 AM  00      05  05  00  30  10      EA  12  ST  Ac
 
 OX  78      -0  -2      .0          ST  60  EP  ti
 
 IC  12      4-  1      00          SI  38  HE  ve
 
 IL  61      20  20                  DE      NS    
 
 LI  30      09  09                              
 
 N   5                               PH      KE    
 
 50                                  AR      VI    
 
 0                                   MA      N     
 
 MG                                  CY      C     
 
                                                
 
 CA                         OF            
 
 PS                         CY         
 
 UL                      NT      
 
 E                       HI      
 
                  AN      
 
                A       
 
                        
 
                       
 
                  
 
                  
 
                  
 
                  
 
                 
 
               
 
              
 
     00      04  04  00  20  5       EA  12  ST  Ac
 
     17      -1  -2      .0          ST  31  EP  ti
 
     26      3-  3-      00          SI  07  HE  ve
 
     35      20  20                  DE      NS    
 
     97      09  09                              
 
     0                               PH      KE    
 
                                     AR      VI    
 
                                     MA      N     
 
                                     CY      C     
 
                                                
 
                            OF            
 
                            CY         
 
                         NT      
 
                         HI      
 
                  AN      
 
                A       
 
                        
 
                       
 
               
 
               
 
               
 
               
 
               
 
               
 
              
 
 AM  00      04  04  00  30  10      EA  12  ST  Ac
 
 OX  78      -0  -2      .0          ST  26  EP  ti
 
 IC  12      9-  3-      00          SI  87  HE  ve
 
 IL  61      20  20                  DE      NS    
 
 LI  30      09  09                              
 
 N   5                               PH      KE    
 
 50                                  AR      VI    
 
 0                                   MA      N     
 
 MG                                  CY      C     
 
                                                
 
 CA                         OF            
 
 PS                         CY         
 
 UL                      NT      
 
 E                       HI      
 
                  AN      
 
                A       
 
                        
 
                       
 
                  
 
                  
 
                  
 
                  
 
                 
 
               
 
              
 
     00      04  04  00  20  5       EA  12  ST  Ac
 
     60      -0  -2      .0          ST  26  EP  ti
 
     35      9-  3-      00          SI  86  HE  ve
 
     46      20  20                  DE      NS    
 
     82      09  09                              
 
     8                               PH      KE    
 
                                     AR      VI    
 
                                     MA      N     
 
                                     CY      C     
 
                                                
 
                            OF            
 
                            CY         
 
                         NT      
 
                         HI      
 
                  AN      
 
                A       
 
                        
 
                       
 
               
 
               
 
               
 
               
 
               
 
               
 
              
 
 CY  59      04  04  00  21  7       CL  19  WR  Ac
 
 CL  74      -1  -2      .0          IN  16  IG  ti
 
 OB  60      3-  3-      00          IC  91  HT  ve
 
 EN  17      20  20                              
 
 ZA  70      09  09                  PH      AR    
 
 VT  6                               AR      DY    
 
 IN                                  MA       C    
 
 E                                   CY            
 
 10                                                
 
                                                
 
 MG                                       
 
                                      
 
 TA                              
 
 BL                              
 
 ET                       
 
                        
 
                     
 
                  
 
               
 
               
 
               
 
               
 
               
 
               
 
     00      04  04  00  21  7       CL  19  WR  Ac
 
     55      -1  -2      .0          IN  16  IG  ti
 
     51      3-  3-      00          IC  92  HT  ve
 
     88      20  20                              
 
     30      09  09                  PH      AR    
 
     2                               AR      DY    
 
                                     MA       C    
 
                                     CY            
 
                                                   
 
                                                 
 
                                          
 
                                       
 
                                 
 
                                 
 
                          
 
                        
 
                     
 
                  
 
     68      04  04  00  20  10      CL  19  WR  Ac
 
     77      -1  -2      .0          IN  16  IG  ti
 
     40      3-  3-      00          IC  90  HT  ve
 
     12      20  20                              
 
     26      09  09                  PH      AR    
 
     0                               AR      DY    
 
                                     MA       C    
 
                                     CY            
 
                                                   
 
                                                  
 
                                          
 
                                       
 
                                 
 
                                 
 
                          
 
                        
 
                     
 
                  
 
                                                                                
                                                                                
                                                                                
                                                                                
                                                                                
                                                                                
  
 
Procedures
                      
 
 
 Procedure  DOS        Code       Location   Performer  Comment  
 
                                                                 
 
                                                 
 
 INCISION     39668      [facility]   RENUSCH             
 
 &   7                     PHYSICIAN                     
 
 DRAINAGE                     S, PLLC            
 
 ABSCESS                  
 
 COMPLICAT             
 
 ED/MULTIP     
 
 LE            
 
               
 
        
 
 IV     81182      [facility]            
 
 INFUSION   5                     [facility]           
 
 THERAPY/P                    INC        INC       
 
 ROPHYLAXI                           
 
 S /DX 1ST             
 
  TO 1 HR      
 
               
 
              
 
 HEPATITIS    93050      [facility]            
 
  C   5                     [facility]           
 
 ANTIBODY                     INC        INC       
 
                                     
 
                      
 
 CULTURE     22832      [facility]            
 
 BACTERIAL  5                     [facility]           
 
  BLOOD                     INC        INC       
 
 AEROBIC                            
 
 W/ID              
 
 ISOLATES      
 
               
 
              
 
 BLOOD     27189      [facility]            
 
 COUNT   5                     [facility]           
 
 COMPLETE                     INC        INC       
 
 AUTO&AUTO                           
 
  DIFRNTL              
 
 WBC           
 
               
 
         
 
 COMPREHEN    39390      [facility]            
 
 SIVE   5                     [facility]           
 
 METABOLIC                    INC        INC       
 
  PANEL                              
 
                       
 
            
 
 HEPATITIS    15767      [facility]            
 
  A   5                     [facility]           
 
 ANTIBODY                     INC        INC       
 
 HAAB                                
 
                       
 
          
 
 [place]     [address]      [facility]            
 
 LACTATE    5                     [facility]           
 
                              INC        INC       
 
                                   
 
             
 
 HEPATITIS    81974      [facility]            
 
  B CORE   5                     [facility]           
 
 ANTIBODY                     INC        INC       
 
 HBCAB                            
 
 TOTAL                 
 
               
 
           
 
 HEPATITIS    14954      [facility]            
 
  B SURF   5                     [facility]           
 
 ANTIBODY                     INC        INC       
 
 HBSAB                               
 
                       
 
           
 
 [place]     [address]      [facility]            
 
 HEPATITIS  5                     [facility]           
 
  B                     INC        INC       
 
 SURFACE                            
 
 ANTIGEN               
 
               
 
             
 
 COLLECTIO  10-  61636      [facility]            
 
 N VENOUS   5                     [facility]           
 
 BLOOD                     INC        INC       
 
 VENIPUNCT                           
 
 URE                   
 
               
 
         
 
 COMPREHEN  10-  27174      [facility]            
 
 SIVE   5                     [facility]           
 
 METABOLIC                    INC        INC       
 
  PANEL                              
 
                       
 
            
 
 [place]   10-  12903      [facility]            
 
 THYROID   5                     [facility]           
 
 STIMULATI                    INC        INC       
 
 NG                            
 
 HORMONE              
 
 TSH           
 
               
 
         
 
 [place]   10-  11094      [facility]            
 
 THYROXINE  5                     [facility]           
 
  TOTAL                       INC        INC       
 
                                     
 
                    
 
 HEMOGLOBI  10-  51307      [facility]            
 
 N   5                     [facility]           
 
 GLYCOSYLA                    INC        INC       
 
 EVELINE A1C                             
 
                       
 
             
 
 BLOOD   10-  55168      [facility]            
 
 COUNT   5                     [facility]           
 
 COMPLETE                     INC        INC       
 
 AUTO&AUTO                           
 
  DIFRNTL              
 
 WBC           
 
               
 
         
 
 HEPATITIS    16023      [facility]            
 
  C   5                     [facility]           
 
 ANTIBODY                     INC        INC       
 
                                     
 
                      
 
 BLOOD     44451      [facility]            
 
 COUNT   5                     [facility]           
 
 COMPLETE                     INC        INC       
 
 AUTO&AUTO                           
 
  DIFRNTL              
 
 WBC           
 
               
 
         
 
 THROMBOPL    35637      [facility]            
 
 ASTIN   5                     [facility]           
 
 TIME                     INC        INC       
 
 PARTIAL                            
 
 PLASMA/WH             
 
 OLE BLOOD     
 
               
 
               
 
 [place]     [address]      [facility]            
 
 THYROXINE  5                     [facility]           
 
  TOTAL                       INC        INC       
 
                                     
 
                    
 
 COMPREHEN    10278      [facility]            
 
 SIVE   5                     [facility]           
 
 METABOLIC                    INC        INC       
 
  PANEL                              
 
                       
 
            
 
 [place]     [address]      [facility]            
 
 AMMONIA    5                     [facility]           
 
                              INC        INC       
 
                                   
 
             
 
 [place]     [address]      [facility]            
 
 THYROID   5                     [facility]           
 
 STIMULATI                    INC        INC       
 
 NG                            
 
 HORMONE              
 
 TSH           
 
               
 
         
 
 INF AGT           [facility]            
 
 AB DETECT  5                     [facility]           
 
  EIA TECH                    INC        INC       
 
                             
 
 HIV-1&/HI             
 
 V-2 SCR       
 
               
 
             
 
 COLLECTIO    58404      [facility]            
 
 N VENOUS   5                     [facility]           
 
 BLOOD                     INC        INC       
 
 VENIPUNCT                           
 
 URE                   
 
               
 
         
 
 HEPATITIS    61981      [facility]            
 
  A   5                     [facility]           
 
 ANTIBODY                     INC        INC       
 
 HAAB                                
 
                       
 
          
 
 PROTHROMB    48089      [facility]            
 
 IN TIME    5                     [facility]           
 
                              INC        INC       
 
                                   
 
             
 
 HEPATITIS    95962      [facility]            
 
  B CORE   5                     [facility]           
 
 ANTIBODY                     INC        INC       
 
 HBCAB                            
 
 TOTAL                 
 
               
 
           
 
 [place]     [address]      [facility]            
 
 HEPATITIS  5                     [facility]           
 
  B                     INC        INC       
 
 SURFACE                            
 
 ANTIGEN               
 
               
 
             
 
 HEPATITIS    22472      [facility]   [facility]            
 
  B SURF   5                     [facility]           
 
 ANTIBODY                     INC        INC       
 
 HBSAB                               
 
                       
 
           
 
 RADIOLOGI    67549      [place]   [facility]            
 
 C EXAM   5                     MEDICAL                      
 
 CHEST 2                     IMAGING            
 
 VIEWS       ASS        
 
 FRONTAL&L               
 
 ATERAL            
 
               
 
            
 
 RADEX     26593      [place]   [facility]            
 
 SPINE   5                     MEDICAL                      
 
 LUMBOSACR                    IMAGING            
 
 AL 2/3       ASS        
 
 VIEWS                   
 
                   
 
           
 
 CT     71304      [place]   [facility]            
 
 HEAD/BRAI  5                     MEDICAL                      
 
 N W/O                     IMAGING            
 
 CONTRAST       ASS        
 
 MATERIAL                
 
                   
 
              
 
 INITIAL     95388      VÍCTOR RENEE SADI            
 
 INPATIENT  4                                                   
 
  CONSULT                                        
 
 NEW/ESTAB       
 
  PT 20      
 
 MIN           
 
               
 
         
 
 [place]     [address]      [facility]JESH   RAJESH            
 
 ABDOMEN &  4                     PAMELA        PAMELA                
 
  PELVIS                                          
 
 W/O                
 
 CONTRAST      
 
 MATERIAL      
 
               
 
              
 
 OPHTH     34007      FISH ARNOLD,            
 
 MEDICAL   0                     VISION     GENA M           
 
 XM&EVAL                                          
 
 COMPRHNSV                       
 
  ESTAB PT     
 
  1/>          
 
               
 
          
 
 TENS           [employer]   [facility]            
 
 DEVICE   0                                                   
 
 4/MORE                                        
 
 LEADS        
 
 MULTI      
 
 NERVE      
 
 STIMULATI     
 
 ON            
 
               
 
        
 
 APPLICATI    54883      [facility]            
 
 ON   0                     [facility]           
 
 MODALITY                     INC        INC       
 
 1/> AREAS                           
 
  HOT/COLD             
 
  PACKS        
 
               
 
            
 
 APPL     24050      [facility]            
 
 MODALITY   0                     [facility]           
 
 1/> AREAS                    INC        INC       
 
  ELEC                            
 
 STIMJ              
 
 UNATTENDE     
 
 D             
 
               
 
       
 
 THERAPEUT    63931      [facility]            
 
 IC PX 1/>  0                     [facility]           
 
  AREAS                     INC        INC       
 
 EACH 15                            
 
 MIN              
 
 EXERCISES     
 
               
 
               
 
 APPL     12297      [facility]            
 
 MODALITY   0                     [facility]           
 
 1/> AREAS                    INC        INC       
 
  ELEC                            
 
 STIMJ EA              
 
 15 MIN        
 
               
 
            
 
 APPLICATI    08763      [facility]            
 
 ON   0                     [facility]           
 
 MODALITY                     INC        INC       
 
 1/> AREAS                           
 
  HOT/COLD             
 
  PACKS        
 
               
 
            
 
 THERAPEUT    58619      [facility]            
 
 IC PX 1/>  0                     [facility]           
 
  AREAS                     INC        INC       
 
 EACH 15                            
 
 MIN              
 
 EXERCISES     
 
               
 
               
 
 APPL     78917      [facility]            
 
 MODALITY   0                     [facility]           
 
 1/> AREAS                    INC        INC       
 
  ELEC                            
 
 STIMJ              
 
 UNATTENDE     
 
 D             
 
               
 
       
 
 APPL     23715      [facility]            
 
 MODALITY   0                     [facility]           
 
 1/> AREAS                    INC        INC       
 
  ELEC                            
 
 STIMJ              
 
 UNATTENDE     
 
 D             
 
               
 
       
 
 THERAPEUT    96037      [facility]            
 
 IC PX 1/>  0                     [facility]           
 
  AREAS                     INC        INC       
 
 EACH 15                            
 
 MIN              
 
 EXERCISES     
 
               
 
               
 
 PHYSICAL     80996      [facility]            
 
 THERAPY   0                     [facility]           
 
 EVALUATIO                    INC        INC       
 
 N                                   
 
                       
 
       
 
 APPLICATI    40469      [facility]            
 
 ON   0                     [facility]           
 
 MODALITY                     INC        INC       
 
 1/> AREAS                           
 
  HOT/COLD             
 
  PACKS        
 
               
 
            
 
 PSYCHOLOG    45975      PHYSICIAN  VERNON THOMAS   0                     S   TASH BOYKIN          
 
 TESTING                     SERVICES             
 
 ADMN BY       [facility]                  
 
 TECH VT                
 
 HR                
 
               
 
        
 
 RADEX     32196      [place]   NETO,            
 
 SPINE   0                     MEDICAL   SHELLY P           
 
 LUMBOSACR                    IMAGING             
 
 AL       ASSOCIATE           
 
 MINIMUM 4    S          
 
  VIEWS                  
 
                 
 
            
 
 LEVEL III  10-  04848      ORAL   ORAL            
 
  SURG   9                     PATHOLOGY  PATHOLOGY          
 
 PATHOLOGY                         
 
        LABORATOR  LABORATOR 
 
 GROSS&ABNER    Y          Y         
 
 ROSCOPIC                          
 
 EXAM              
 
               
 
          
 
 BIOPSY OF    58496      SEBAS MULLEN           
 
  LIP       9                     , DANIEL SANCHEZ         
 
                                
 
         
 
 THER     61505      SEBAS MULLEN           
 
 PROPH/DX   9                     DANIEL ROBERT           
 
 NJX IV                     W          W         
 
 PUSH                            
 
 SINGLE/1S         
 
 T      
 
 SBST/DRUG     
 
               
 
               
 
 ORTHOPANT    72030      SEBAS MULLEN           
 
 OGRAM      DANIEL Ivy ROBERT W W         
 
                                 
 
         
 
 DEEP           SEBAS MULLEN           
 
 SEDATION/  DANIEL Ivy ROBERT           
 
 GENERAL                     W          W         
 
 ANESTHESI                           
 
 A-1ST 30          
 
 MINUTES       
 
               
 
             
 
 MRI     47170      REYNA C  RAJESH,           
 
 SPINAL   9                      RAJESH   REYNA           
 
 CANAL                                          
 
 LUMBAR                           
 
 W/O      
 
 CONTRAST      
 
 MATERIAL      
 
               
 
              
 
 3D     01831      REYNA C  RAJESH,           
 
 RENDERING  9                      RAJESH   REYNA           
 
  W/INTERP                                         
 
  &                           
 
 POSTPROCE     
 
 SS      
 
 SUPERVISI     
 
 ON            
 
               
 
        
 
 RADEX     42857      [facility]   [facility]            
 
 SPINE   9                     [facility]           
 
 LUMBOSACR                    INC        INC       
 
 AL                            
 
 MINIMUM 4             
 
  VIEWS        
 
               
 
            
 
                                                                                
                                                                                
                                                                                
                                                                                
                                                                                
                                                                                
                                                                                
                                                                                
                              
 
Encounters
                      
 
 
 Encounter  Start   End Date   Code       Location   Performer
 
  Type      Date                                                 
 
                                                        
 
                
 
 EMERGENCY    84413      [facility] RAMESH  
 
    7          7                     PHYSICIAN           
 
 DE[facility]MEN                               [facility]         
 
 T VISIT                    
 
 HIGH/URGE             
 
 NT      
 
 SEVERITY      
 
               
 
              
 
 HOSPITAL                [facility]            
 
 -   5          5                     [facility]            
 
 OUTPATIEN                                   INC                 
 
 T                                             
 
                   
 
       
 
 EMERGENCY    47017      [facility]            
 
    5          5                     [facility]                               [facility]                 
 
 T VISIT                            
 
 MODERATE          
 
 SEVERITY      
 
               
 
              
 
 EMERGENCY    09530      KAVITHA ROMERO 
 
    5          5                     PHYSICIAN  [facility]      
 
 DEPARTMEN                               [facility]             
 
 T VISIT                      
 
 HIGH/URGE             
 
 NT      
 
 SEVERITY      
 
               
 
              
 
 HOSPITAL   10-  10-             [facility]            
 
 -   5          5                     [facility]            
 
 OUTPATIEN                                   INC                 
 
 T                                             
 
                   
 
       
 
 OFFICE     42998      [facility]   HEATHER 
 
 OUTPATITRENTON  5          5                     PHYSICIAN  [facility]      
 
 T VISIT                                S GROUP             
 
 15                      
 
 MINUTES               
 
               
 
             
 
 HOSPITAL                [facility]            
 
 -   5          5                     [facility]            
 
 OUTPATIEN                                   INC                 
 
 T                                             
 
                   
 
       
 
 OFFICE     46406      JOYCE COOK OUTPATIEN  0          0                     VINICIO COOPER
 
 T VISIT                                                    
 
 15                            
 
 MINUTES       
 
               
 
             
 
 OFFICE     58591      JOYCE COOK OUTPATIEN  0          0                     [facility]INIC[facility] COOPER
 
 T VISIT                                                    
 
 15                            
 
 MINUTES       
 
               
 
             
 
 OFFICE     27414      PHYSICIAN  VISHNU CRUZ  0          0                     [facility] COOPER   
 
 T VISIT                                SERVICES            
 
 25          PSC            
 
 MINUTES                 
 
                   
 
             
 
 OFFICE     42225      JOYCE COOK OUTPATIEN  0          0                     VINICIO COOPER
 
 T VISIT                                                    
 
 15                            
 
 MINUTES       
 
               
 
             
 
 HOSPITAL                [facility]            
 
 -   0          0                     [facility]            
 
 OUTPATIEN                                   INC                 
 
 T                                             
 
                   
 
       
 
 OFFICE     52414      VISHNU MACIEL  0          0                     S   [facility] [place] [facility]                                SERVICES            
 
 MINUTES           PSC               
 
                         
 
                 
 
 EMERGENCY    42841      [facility]            
 
    0          0                     [facility]            
 
 DEPARTMEN                               INC                 
 
 T VISIT                            
 
 LOW/MODER         
 
  SEVERITY     
 
               
 
               
 
 HOSPITAL   02-  02-             [facility]            
 
 -   0          0                     [facility]            
 
 OUTPATIEN                                   INC                 
 
 T                                             
 
                   
 
       
 
 EMERGENCY    82206      MERLE   HEATHER, 
 
    0          0                     EMERGENCY  [facility]           
 
 T VISIT                    
 
 HIGH/URGE     ASSOCIATE 
 
 NT      S         
 
 SEVERITY                
 
                 
 
              
 
 OFFICE   10-  10-  06261      JOYCE COOK, 
 
 OUTPATIEN  9          9                     VINICIO MOONEY ALLISON
 
 T NEW 30                                                    
 
 MINUTES                             
 
               
 
             
 
 OFFICE     54693      [place]   CONSUELO, 
 
 CONSULTAT  9          9                     MEDICAL   [place] A
 
 ADAN                                SERV            
 
 NEW/ESTAB         FOUNDATIO         
 
  PATIENT                
 
 40 MIN                  
 
               
 
            
 
 OFFICE     79153      [facility]A REYESPATITRENTON  9          9                     [facility]       
 
 T VISIT                                 PSC                
 
 15                    
 
 MINUTES            
 
               
 
             
 
 OFFICE     70808      [facility] REYES  9          9                     [facility]       
 
 T VISIT                                 PSC                
 
 15                    
 
 MINUTES            
 
               
 
             
 
 HOSPITAL                [facility]            
 
 -   9          9                     [facility]            
 
 OUTPATIEN                                   INC                 
 
 T                                             
 
                   
 
       
 
 OFFICE     30238      ASA REYESPATITRENTON  9          9                     [facility] VISIT                                 PSC                
 
 15                    
 
 MINUTES            
 
               
 
             
 
 [facility]                [facility]            
 
 -   9          9                     [facility]            
 
 OUTPATIEN                                   INC                 
 
 T                                             
 
                   
 
       
 
 OFFICE     52792      ASA REYES
 
 OUTPATITRENTON  9          9                     [facility] VISIT                                 PSC                
 
 15                    
 
 [email or web address]

## 2017-10-07 NOTE — EXTERNAL MEDICAL SUMMARY RPT
[facility] Patient Summary
 Created on: 10/04/2017
 
BERYL MANN
External Reference #: [id number]
: 68
Sex: Female
 
Demographics
 
 
 
 Address  [address] [place]  [address]
 
 Home Phone  [phone]
 
 Preferred Language  Unknown
 
 Marital Status  Unknown
 
 [employer] Affiliation  Unknown
 
 Race  Unknown
 
 Ethnic Group  Unknown
 
 
Author
 
 
 
 Author  [facility] Andrade, [employer] 
 
 Organization  [employer]
 
 Address  Unknown
 
 Phone  Unavailable
 
 
 
Results
 
 
 
 Vancomycin [Mass/volume] in Serum or Plasma --trough
 
 
 
 
 Observa  Value  Referen  Units  Interpr  Notes  Date
 
 tion   ce    etation  
 
   Range    
 
 
 
 
 Vancomyci  10.0 -   mcg/mL  Normal  No   Sep 3 
 
 n   20.0    informati  2017 
 
 [Mass/vol     on in   12:45 PM
 
 ume] in      source  
 
 Serum or      data 
 
 Plasma      
 
 --trough     
 
 
 
 
 Choriogonadotropin.beta subunit [Units] in 24 hour Urine
 
 
 
 
 Observa  Value  Referen  Units  Interpr  Notes  Date
 
 tion   ce    etation  
 
   Range    
 
 
 
 
 Choriogon  NEG  No   No   No   Sep 2 
 
 adotropin   informati  informati  informati  2017 
 
 .beta    on in   on in   on in   10:00 AM
 
 subunit    source   source   source  
 
 [Units]    data  data  data 
 
 in 24      
 
 hour      
 
 Urine     
 
 
 
 
 Basic metabolic panel in Blood
 
 
 
 
 Observa  Value  Referen  Units  Interpr  Notes  Date
 
 tion   ce    etation  
 
   Range    
 
 
 
 
 Urea   7 - 18  mg/dL  Low  No   Sep 2 
 
 nitrogen      informati  2017 6:25
 
 [Mass/vol     on in    AM
 
 ume] in      source  
 
 Serum or      data 
 
 Plasma     
 
 Calcium   8.5 -   mg/dL  Low  No   Sep 2 
 
 [Mass/vol  10.1    informati  2017 6:25
 
 ume] in      on in    AM
 
 Serum or      source  
 
 Plasma     data 
 
 Chloride   98 - 107  mmoL/L  Normal  No   Sep 2 
 
 [Moles/vo     informati  2017 6:25
 
 lume] in      on in    AM
 
 Serum or      source  
 
 Plasma     data 
 
 Carbon   21.0 -   mmoL/L  Normal  No   Sep 2 
 
 dioxide,   32.0    informati  2017 6:25
 
 total      on in    AM
 
 [Moles/vo     source  
 
 lume] in      data 
 
 Serum or      
 
 Plasma     
 
 Creatinin  0.55 -   mg/dL  Normal  No   Sep 2 
 
 e   1.02    informati  2017 6:25
 
 [Mass/vol     on in    AM
 
 ume] in      source  
 
 Serum or      data 
 
 Plasma     
 
 Creatinin  50 - 200  ML/MIN  Normal  No   Sep 2 
 
 e renal      informati  2017 6:25
 
 clearance     on in    AM
 
       source  
 
 predicted     data 
 
  by      
 
 Cockcroft     
 
 -Gault      
 
 formula     
 
 Estimated  59-  ML/MIN  No   REFERENCE  Sep 2 
 
      informati   RANGE:   2017 6:25
 
 glomerula    on in   >60    AM
 
 r     source   ML/MIN/1. 
 
 filtratio    data  73 SQUARE 
 
 n rate       METERSIf 
 
 (GF      this  
 
     patient  
 
     is  
 
     -A 
 
     merican,  
 
     then  
 
     multiply  
 
     theresult 
 
      by  
 
     1.210. 
 
 Glucose   74 - 106  mg/dL  Normal  No   Sep 2 
 
 [Mass/vol     informati  2017 6:25
 
 ume] in      on in    AM
 
 Serum or      source  
 
 Plasma     data 
 
 Potassium  3.5 - 5.1  mmoL/L  Normal  No   Sep 2 
 
       informati  2017 6:25
 
 [Moles/vo     on in    AM
 
 lume] in      source  
 
 Serum or      data 
 
 Plasma     
 
 Sodium   136 - 145  mmoL/L  Normal  No   Sep 2 
 
 [Moles/vo     informati  2017 6:25
 
 lume] in      on in    AM
 
 Serum or      source  
 
 Plasma     data 
 
 
 
 
 CBC W Auto Differential panel in Blood
 
 
 
 
 Observa  Value  Referen  Units  Interpr  Notes  Date
 
 tion   ce    etation  
 
   Range    
 
 
 
 
 Basophils  0 - 0.2  K/MM3  Normal  No   Sep 2 
 
       informati  2017 6:25
 
 [#/volume     on in    AM
 
 ] in      source  
 
 Blood by      data 
 
 Automated     
 
  count     
 
 Basophils  0.1 - 2.0  %  Normal  No   Sep 2 
 
 /100      informati  2017 6:25
 
 leukocyte     on in    AM
 
 s in      source  
 
 Blood by      data 
 
 Automated     
 
  count     
 
 Eosinophi  0.0 - 0.4  K/mm3  Normal  No   Sep 2 
 
 ls      informati  2017 6:25
 
 [#/volume     on in    AM
 
 ] in      source  
 
 Blood by      data 
 
 Automated     
 
  count     
 
 Eosinophi  0.1 -   %  Normal  No   Sep 2 
 
 ls/100   12.0    informati  2017 6:25
 
 leukocyte     on in    AM
 
 s in      source  
 
 Blood by      data 
 
 Automated     
 
  count     
 
 Granulocy  1.8 - 7.8  K/mm3  Normal  No   Sep 2 
 
 karen      informati  2017 6:25
 
 [#/volume     on in    AM
 
 ] in      source  
 
 Blood by      data 
 
 Automated     
 
  count     
 
 Granulocy  37.0 -   %  Normal  No   Sep 2 
 
 karen/100   80.0    informati  2017 6:25
 
 leukocyte     on in    AM
 
 s in      source  
 
 Blood by      data 
 
 Automated     
 
  count     
 
 Hematocri  37.0 -   %  Low  No   Sep 2 
 
 t [Volume  47.0    informati  2017 6:25
 
       on in    AM
 
 Fraction]     source  
 
  of Blood     data 
 
 Hemoglobi  12.2 -   g/dL  Low  No   Sep 2 
 
 n   16.2    informati  2017 6:25
 
 [Mass/vol     on in    AM
 
 ume] in      source  
 
 Blood     data 
 
 Lymphocyt  0.7 - 4.5  K/mm3  Normal  No   Sep 2 
 
 es      informati   6:25
 
 [#/volume     on in    AM
 
 ] in      source  
 
 Unspecifi     data 
 
 ed      
 
 specimen      
 
 by      
 
 Automated     
 
  count     
 
 Lymphocyt  10 - 50.0  %  Normal  No   Sep 2 
 
 es      informati  2017 6:25
 
 [#/volume     on in    AM
 
 ] in      source  
 
 Unspecifi     data 
 
 ed      
 
 specimen      
 
 by      
 
 Automated     
 
  count     
 
 Erythrocy  27 - 31.2  pg  Normal  No   Sep 2 
 
 te mean      informati  2017 6:25
 
 corpuscul     on in    AM
 
 ar      source  
 
 hemoglobi     data 
 
 n      
 
 [Entitic      
 
 mass]     
 
 Erythrocy  31.8 -   g/dl  Normal  No   Sep 2 
 
 te mean   35.4    informati  2017 6:25
 
 corpuscul     on in    AM
 
 ar      source  
 
 hemoglobi     data 
 
 n      
 
 concentra     
 
 tion      
 
 [Mass/vol     
 
 ume] by      
 
 Automated     
 
  count     
 
 Erythrocy  82.2 -   fl  Normal  No   Sep 2 
 
 te mean   97.8    informati  2017 6:25
 
 corpuscul     on in    AM
 
 ar volume     source  
 
  [Entitic     data 
 
  volume]      
 
 by      
 
 Automated     
 
  count     
 
 Monocytes  0.1 - 1.0  K/mm3  Normal  No   Sep 2 
 
       informati  2017 6:25
 
 [#/volume     on in    AM
 
 ] in      source  
 
 Blood by      data 
 
 Automated     
 
  count     
 
 Monocytes  1.7 - 9.3  %  High  No   Sep 2 
 
 /100      informati  2017 6:25
 
 leukocyte     on in    AM
 
 s in      source  
 
 Blood by      data 
 
 Automated     
 
  count     
 
 Platelet   7.4 -   fl  Normal  No   Sep 2 
 
 mean   10.4    informati  2017 6:25
 
 volume      on in    AM
 
 [Entitic      source  
 
 volume]      data 
 
 in Blood      
 
 by      
 
 Automated     
 
  count     
 
 Platelets  142 - 424  K/mm3  Normal  No   Sep 2 
 
       informati  2017 6:25
 
 [#/volume     on in    AM
 
 ] in      source  
 
 Blood     data 
 
 Erythrocy  4.2 - 5.4  M/mm3  Low  No   Sep 2 
 
 karen      informati  2017 6:25
 
 [#/volume     on in    AM
 
 ] in      source  
 
 Amniotic      data 
 
 fluid     
 
 Erythrocy  11.5 -   %  Normal  No   Sep 2 
 
 te   17.5    informati  2017 6:25
 
 distribut     on in    AM
 
 ion width     source  
 
  [Entitic     data 
 
  volume]      
 
 by      
 
 Automated     
 
  count     
 
 Leukocyte  4.8 -   K/MM3  No   No   Sep 2 
 
 s   10.8   informati  informati  2017 6:25
 
 [#/volume    on in   on in    AM
 
 ] in     source   source  
 
 Blood    data  data 
 
 
 
 
 CRP
 
 
 
 
 Observa  Value  Referen  Units  Interpr  Notes  Date
 
 tion   ce    etation  
 
   Range    
 
 
 
 
 CRP  0.0 - 0.9  MG/DL  High  No   Sep 1 
 
     informati  2017 6:40
 
     on in    PM
 
     source  
 
     data 
 
 
 
 
 Lactate [Moles/volume] in Blood
 
 
 
 
 Observa  Value  Referen  Units  Interpr  Notes  Date
 
 tion   ce    etation  
 
   Range    
 
 
 
 
 Lactate   0.4 - 2.0  mmol/L  Normal  No   Sep 1 
 
 [Moles/vo     informati  2017 6:40
 
 lume] in      on in    PM
 
 Blood     source  
 
     data 
 
 
 
 
 Comprehensive metabolic 2000 panel in Serum or Plasma
 
 
 
 
 Observa  Value  Referen  Units  Interpr  Notes  Date
 
 tion   ce    etation  
 
   Range    
 
 
 
 
 Albumin/G  1.1 - 1.8  No   Low  No   Sep 1 
 
 lobulin    informati   informati  2017 6:40
 
 [Mass    on in    on in    PM
 
 ratio] in   source    source  
 
  Serum or   data   data 
 
  Plasma     
 
 Albumin   3.4 - 5.0  gm/dL  Low  No   Sep 1 
 
 [Mass/vol     informati  2017 6:40
 
 ume] in      on in    PM
 
 Serum or      source  
 
 Plasma     data 
 
 Alkaline   46 - 116  U/L  High  No   Sep 1 
 
 phosphata     informati  2017 6:40
 
 se      on in    PM
 
 [Enzymati     source  
 
 c      data 
 
 activity/     
 
 volume]      
 
 in Serum      
 
 or Plasma     
 
 Bilirubin  0.2 - 1.0  mg/dL  Normal  No   Sep 1 
 
 .total      informati  2017 6:40
 
 [Mass/vol     on in    PM
 
 ume] in      source  
 
 Serum or      data 
 
 Plasma     
 
 Urea   7 - 18  mg/dL  Low  No   Sep 1 
 
 nitrogen      informati  2017 6:40
 
 [Mass/vol     on in    PM
 
 ume] in      source  
 
 Serum or      data 
 
 Plasma     
 
 Calcium   8.5 -   mg/dL  Normal  No   Sep 1 
 
 [Mass/vol  10.1    informati  2017 6:40
 
 ume] in      on in    PM
 
 Serum or      source  
 
 Plasma     data 
 
 Chloride   98 - 107  mmoL/L  Low  No   Sep 1 
 
 [Moles/vo     informati  2017 6:40
 
 lume] in      on in    PM
 
 Serum or      source  
 
 Plasma     data 
 
 Carbon   21.0 -   mmoL/L  Normal  No   Sep 1 
 
 dioxide,   32.0    informati  2017 6:40
 
 total      on in    PM
 
 [Moles/vo     source  
 
 lume] in      data 
 
 Serum or      
 
 Plasma     
 
 Creatinin  0.55 -   mg/dL  Normal  No   Sep 1 
 
 e   1.02    informati  2017 6:40
 
 [Mass/vol     on in    PM
 
 ume] in      source  
 
 Serum or      data 
 
 Plasma     
 
 Creatinin  50 - 200  ML/MIN  Normal  No   Sep 1 
 
 e renal      informati  2017 6:40
 
 clearance     on in    PM
 
       source  
 
 predicted     data 
 
  by      
 
 Cockcroft     
 
 -Gault      
 
 formula     
 
 Estimated  59-  ML/MIN  No   REFERENCE  Sep 1 
 
      informati   RANGE:   2017 6:40
 
 glomerula    on in   >60    PM
 
 r     source   ML/MIN/1. 
 
 filtratio    data  73 SQUARE 
 
 n rate       METERSIf 
 
 (GF      this  
 
     patient  
 
     is  
 
     -A 
 
     merican,  
 
     then  
 
     multiply  
 
     theresult 
 
      by  
 
     1.210. 
 
 Globulin   1.3 - 3.2  gm/dL  High  No   Sep 1 
 
 [Mass/vol     informati  2017 6:40
 
 ume] in      on in    PM
 
 Serum     source  
 
     data 
 
 Glucose   74 - 106  mg/dL  High  No   Sep 1 
 
 [Mass/vol     informati  2017 6:40
 
 ume] in      on in    PM
 
 Serum or      source  
 
 Plasma     data 
 
 Potassium  3.5 - 5.1  mmoL/L  Low  No   Sep 1 
 
       informati  2017 6:40
 
 [Moles/vo     on in    PM
 
 lume] in      source  
 
 Serum or      data 
 
 Plasma     
 
 Sodium   136 - 145  mmoL/L  Low  No   Sep 1 
 
 [Moles/vo     informati  2017 6:40
 
 lume] in      on in    PM
 
 Serum or      source  
 
 Plasma     data 
 
 Aspartate  15 - 37  U/L  Normal  No   Sep 1 
 
       informati  2017 6:40
 
 aminotran     on in    PM
 
 sferase      source  
 
 [Enzymati     data 
 
 c      
 
 activity/     
 
 volume]      
 
 in Serum      
 
 or Plasma     
 
 Alanine   12 - 78  U/L  Normal  No   Sep 1 
 
 aminotran     informati  2017 6:40
 
 sferase      on in    PM
 
 [Enzymati     source  
 
 c      data 
 
 activity/     
 
 volume]      
 
 in Serum      
 
 or Plasma     
 
 Protein   6.4 - 8.2  gm/dL  High  No   Sep 1 
 
 [Mass/vol     informati   6:40
 
 ume] in      on in    PM
 
 Serum or      source  
 
 Plasma     data 
 
 
 
 
 CBC W Auto Differential panel in Blood
 
 
 
 
 Observa  Value  Referen  Units  Interpr  Notes  Date
 
 tion   ce    etation  
 
   Range    
 
 
 
 
 Basophils  0 - 0.2  K/MM3  Normal  No   Sep 1 
 
       informati  2017 6:40
 
 [#/volume     on in    PM
 
 ] in      source  
 
 Blood by      data 
 
 Automated     
 
  count     
 
 Basophils  0.1 - 2.0  %  Normal  No   Sep 1 
 
 /100      informati  2017 6:40
 
 leukocyte     on in    PM
 
 s in      source  
 
 Blood by      data 
 
 Automated     
 
  count     
 
 Eosinophi  0.0 - 0.4  K/mm3  Normal  No   Sep 1 
 
 ls      informati  2017 6:40
 
 [#/volume     on in    PM
 
 ] in      source  
 
 Blood by      data 
 
 Automated     
 
  count     
 
 Eosinophi  0.1 -   %  Normal  No   Sep 1 
 
 ls/100   12.0    informati  2017 6:40
 
 leukocyte     on in    PM
 
 s in      source  
 
 Blood by      data 
 
 Automated     
 
  count     
 
 Granulocy  1.8 - 7.8  K/mm3  Normal  No   Sep 1 
 
 karen      informati  2017 6:40
 
 [#/volume     on in    PM
 
 ] in      source  
 
 Blood by      data 
 
 Automated     
 
  count     
 
 Granulocy  37.0 -   %  Normal  No   Sep 1 
 
 karen/100   80.0    informati  2017 6:40
 
 leukocyte     on in    PM
 
 s in      source  
 
 Blood by      data 
 
 Automated     
 
  count     
 
 Hematocri  37.0 -   %  Low  No   Sep 1 
 
 t [Volume  47.0    informati  2017 6:40
 
       on in    PM
 
 Fraction]     source  
 
  of Blood     data 
 
 Hemoglobi  12.2 -   g/dL  No   No   Sep 1 
 
 n   16.2   informati  informati  2017 6:40
 
 [Mass/vol    on in   on in    PM
 
 ume] in     source   source  
 
 Blood    data  data 
 
 Lymphocyt  0.7 - 4.5  K/mm3  Normal  No   Sep 1 
 
 es      informati  2017 6:40
 
 [#/volume     on in    PM
 
 ] in      source  
 
 Unspecifi     data 
 
 ed      
 
 specimen      
 
 by      
 
 Automated     
 
  count     
 
 Lymphocyt  10 - 50.0  %  Normal  No   Sep 1 
 
 es      informati  2017 6:40
 
 [#/volume     on in    PM
 
 ] in      source  
 
 Unspecifi     data 
 
 ed      
 
 specimen      
 
 by      
 
 Automated     
 
  count     
 
 Erythrocy  27 - 31.2  pg  Normal  No   Sep 1 
 
 te mean      informati  2017 6:40
 
 corpuscul     on in    PM
 
 ar      source  
 
 hemoglobi     data 
 
 n      
 
 [Entitic      
 
 mass]     
 
 Erythrocy  31.8 -   g/dl  Normal  No   Sep 1 
 
 te mean   35.4    informati  2017 6:40
 
 corpuscul     on in    PM
 
 ar      source  
 
 hemoglobi     data 
 
 n      
 
 concentra     
 
 tion      
 
 [Mass/vol     
 
 ume] by      
 
 Automated     
 
  count     
 
 Erythrocy  82.2 -   fl  Normal  No   Sep 1 
 
 te mean   97.8    informati  2017 6:40
 
 corpuscul     on in    PM
 
 ar volume     source  
 
  [Entitic     data 
 
  volume]      
 
 by      
 
 Automated     
 
  count     
 
 Monocytes  0.1 - 1.0  K/mm3  Normal  No   Sep 1 
 
       informati  2017 6:40
 
 [#/volume     on in    PM
 
 ] in      source  
 
 Blood by      data 
 
 Automated     
 
  count     
 
 Monocytes  1.7 - 9.3  %  Normal  No   Sep 1 
 
 /100      informati  2017 6:40
 
 leukocyte     on in    PM
 
 s in      source  
 
 Blood by      data 
 
 Automated     
 
  count     
 
 Platelet   7.4 -   fl  Low  No   Sep 1 
 
 mean   10.4    informati  2017 6:40
 
 volume      on in    PM
 
 [Entitic      source  
 
 volume]      data 
 
 in Blood      
 
 by      
 
 Automated     
 
  count     
 
 Platelets  142 - 424  K/mm3  Normal  No   Sep 1 
 
       informati  2017 6:40
 
 [#/volume     on in    PM
 
 ] in      source  
 
 Blood     data 
 
 Erythrocy  4.2 - 5.4  M/mm3  Low  No   Sep 1 
 
 karen      informati  2017 6:40
 
 [#/volume     on in    PM
 
 ] in      source  
 
 Amniotic      data 
 
 fluid     
 
 Erythrocy  11.5 -   %  Normal  No   Sep 1 
 
 te   17.5    informati  2017 6:40
 
 distribut     on in    PM
 
 ion width     source  
 
  [Entitic     data 
 
  volume]      
 
 by      
 
 Automated     
 
  count     
 
 Leukocyte  4.8 -   K/MM3  Normal  No   Sep 1 
 
 s   10.8    informati   6:40
 
 [#/volume     on in    PM
 
 ] in      source  
 
 Blood     data 
 
 
 
 
 HepC Qnt-ARUP
 
 
 
 
 Observa  Value  Referen  Units  Interpr  Notes  Date
 
 tion   ce    etation  
 
   Range    
 
 Hepatit  7.3  No   log_IU  No   INTERPR   
 
 is C    informa   informa  ETIVE   2017 
 
 RNA   tion in   tion in  INFORMA  3:50 PM
 
    source    source  TION:  
 
    data    data  Hepatit 
 
      is C  
 
      Virus  
 
      by  
 
      Quantit 
 
      ative  
 
      PCR\.br 
 
      \The  
 
      quantit 
 
      ative  
 
      range  
 
      of this 
 
       assay  
 
      is 1.2  
 
      - 8.0  
 
      log  
 
      IU/mL  
 
      (15-\.b 
 
      r\100,0 
 
      00,000  
 
      IU/mL). 
 
      \.br\Li 
 
      elaine of  
 
      detecti 
 
      on  
 
      (LOD):\ 
 
      .br\15  
 
      IU/mL  
 
      (1.2  
 
      log  
 
      IU/mL)\ 
 
      .br\LOD 
 
       values 
 
       do not 
 
       apply  
 
      to  
 
      diluted 
 
        
 
      specime 
 
      ns.\.br 
 
      \An  
 
      interpr 
 
      etation 
 
       of  
 
      "Not  
 
      Detecte 
 
      d" does 
 
       not  
 
      rule  
 
      out the 
 
        
 
      presenc 
 
      e\.br\o 
 
      f PCR  
 
      inhibit 
 
      ors in  
 
      the  
 
      patient 
 
        
 
      specime 
 
      n or  
 
      hepatit 
 
      is C  
 
      virus  
 
      RNA\.br 
 
      \concen 
 
      tration 
 
      s below 
 
       the  
 
      level  
 
      of  
 
      detecti 
 
      on of  
 
      the  
 
      test.  
 
      Care\.b 
 
      r\shoul 
 
      d be  
 
      taken  
 
      when  
 
      interpr 
 
      eting  
 
      any  
 
      single  
 
      viral  
 
      load\.b 
 
      r\deter 
 
      minatio 
 
      n.\.br\ 
 
      This  
 
      test  
 
      should  
 
      not be  
 
      used  
 
      for  
 
      blood  
 
      donor  
 
      screeni 
 
      ng,  
 
      associa 
 
      regi\.br 
 
      \re-ent 
 
      ry  
 
      protoco 
 
      ls, or  
 
      for  
 
      screeni 
 
      ng  
 
      Human  
 
      Cell,  
 
      Tissues 
 
        
 
      and\.br 
 
      \Cellul 
 
      ar  
 
      Tissue- 
 
      Based  
 
      Product 
 
      s  
 
      (HCT/P) 
 
      . 
 
 HCV   19,000,  No   IU/mL  No   No    
 
 IU-AR[facility]  000  informa   informa  informa  2017 
 
   tion in   tion in  tion in  3:50 PM
 
    source    source   source 
 
    data    data   data 
 
 HCV RNA  Detecte  Not   No   Abnorma  No    
 
    d  Detecte  informa  l  informa  2017 
 
 Interpr   d  tion in   tion in  3:50 PM
 
 etation     source    source 
 
     data    data 
 
 EER HCV  See   No   No   No   Access    
 
  RNA   Note  informa  informa  informa  ARUP   2017 
 
 Quant    tion in  tion in  tion in  Enhance  3:50 PM
 
 RT-PCR-    source   source   source  d  
 
 [facility]    data   data   data  Report  
 
      using  
 
      either  
 
      link  
 
      below:\ 
 
      .br\\.b 
 
      r\-Dire 
 
      ct  
 
      access: 
 
        
 
      https:/ 
 
      [email or web address] 
 
      [id number] 
 
      [id number] 
 
      [id number] 
 
      \.br\\. 
 
      br\-Ent 
 
      er  
 
      Usernam 
 
      e,  
 
      Passwor 
 
      d:  
 
      https:/ 
 
      [email or web address] 
 
      [employer]. 
 
      com\.br 
 
      \  
 
      Usernam 
 
      e:  
 
      9Km?q-E 
 
      7\.br\  
 
      Passwor 
 
      d:  
 
      X!d3+c\ 
 
      .br\Per 
 
      formed  
 
      by EMERITA garcia,\ 
 
      .br\[address] 
 
        
 
      Yusef Cummins  
 
      [facility] – [place]  
 
      [id number]  
 
      [phone]\. 
 
      br\www. 
 
      aruplab 
 
      .com,  
 
      Ramesh Blankenship MD -  
 
      Lab.  
 
      Directo 
 
      r 
 
 
 
 
 EK EKG 12 LEAD
 
 
 
 
 Observa  Value  Referen  Units  Interpr  Notes  Date
 
 tion   ce    etation  
 
   Range    
 
    Station  No   No   No   No    
 
  clara ECG  informa  informa  informa  informa  2017 
 
     tion in  tion in  tion in  tion in  5:20 PM
 
  Study\.   source   source   source   source 
 
  br\St.    data   data   data   data 
 
  Elizabe     
 
  th      
 
  [place]almout     
 
  h\.br\I     
 
  nterpre     
 
  tive      
 
  Stateme     
 
  nts\.br     
 
  \Sinus      
 
  bradyca     
 
  rdia\.b     
 
  r\Chrissie     
 
  l ECG      
 
  except      
 
  for      
 
  rate\.b     
 
  r\Elect     
 
  ronical     
 
  ly      
 
  Signed      
 
  On      
 
  7-15-20     
 
  17      
 
  11:30:5     
 
  4 EDT      
 
  by Price Whitman MD     
 
 
 
 
 [place]yd/GC TMA
 
 
 
 
 Observa  Value  Referen  Units  Interpr  Notes  Date
 
 tion   ce    etation  
 
   Range    
 
 [place]  Negativ  No   No   No   No    
 
 ia   e  informa  informa  informa  informa   
 
 trachom   tion in  tion in  tion in  tion in  12:45 
 
 atis    source   source   source   source  PM
 
    data   data   data   data 
 
 Neisser  Negativ  No   No   No   Testing   
 
 ia   e  informa  informa  informa      
 
 gonorrh   tion in  tion in  tion in  methodo  12:45 
 
 oeae    source   source   source  logy is  PM
 
    data   data   data    
 
      transcr 
 
      iption  
 
      mediate 
 
      d  
 
      amplifi 
 
      cation  
 
      (TMA)  
 
      using  
 
      the  
 
      Aptima  
 
      Combo 2 
 
       assay  
 
      from  
 
      [employer]gn[employer] 
 
      /[employer] 
 
      be.\.br 
 
      \A  
 
      negativ 
 
      e  
 
      result  
 
      does  
 
      not  
 
      complet 
 
      ely  
 
      rule  
 
      out a  
 
      [place] 
 
      ia  
 
      trachom 
 
      atis or 
 
        
 
      Neisser 
 
      ia  
 
      gonorrh 
 
      oeae  
 
      infecti 
 
      on due  
 
      to  
 
      potenti 
 
      al  
 
      inhibit 
 
      ors or  
 
      levels  
 
      present 
 
       below  
 
      the  
 
      limit  
 
      of  
 
      detecti 
 
      on by  
 
      this  
 
      assay.  
 
        
 
      Results 
 
       are  
 
      depende 
 
      nt on  
 
      proper  
 
      collect 
 
      ion and 
 
        
 
      transpo 
 
      rt of  
 
      specime 
 
      n. This 
 
       test  
 
      is  
 
      indicat 
 
      ed for  
 
      medical 
 
        
 
      purpose 
 
      s only  
 
      and  
 
      should  
 
      not be  
 
      used  
 
      for  
 
      legal  
 
      or  
 
      forensi 
 
      c  
 
      purpose 
 
      s.\.br\ 
 
      \.br\Th 
 
      e  
 
      perform 
 
      ance  
 
      charact 
 
      eristic 
 
      s of  
 
      this  
 
      test  
 
      were  
 
      validat 
 
      ed by  
 
      [facility] 
 
      are  
 
      laborat 
 
      ory.  
 
      This  
 
      assay  
 
      is FDA  
 
      cleared 
 
       to  
 
      test  
 
      the  
 
      followi 
 
      ng  
 
      specime 
 
      ns:  
 
      clinici 
 
      an-kelechi 
 
      ected  
 
      endocer 
 
      vical,  
 
      vaginal 
 
       and  
 
      male  
 
      urethra 
 
      l swab  
 
      specime 
 
      ns,  
 
      patient 
 
        
 
      collect 
 
      ed  
 
      vaginal 
 
        
 
      specime 
 
      ns  
 
      within  
 
      a  
 
      clinic  
 
      setting 
 
      , Thin  
 
      Prep  
 
      Specime 
 
      ns in  
 
      Preserv 
 
      Cyt  
 
      Solutio 
 
      n, and  
 
      first-s 
 
      tream,  
 
      unprese 
 
      rved  
 
      male  
 
      urine  
 
      specime 
 
      ns.  
 
      Testing 
 
       on  
 
      female  
 
      urine  
 
      is not  
 
      FDA  
 
      approve 
 
      d by  
 
      this  
 
      methodo 
 
      logy,  
 
      but has 
 
       been  
 
      develop 
 
      ed and  
 
      validat 
 
      ed by  
 
      the [facility] 
 
      are  
 
      laborat 
 
      ory.   
 
      Detaile 
 
      d  
 
      methodo 
 
      logy is 
 
        
 
      availab 
 
      le upon 
 
        
 
      request 
 
      . 
 
 
 
 
 Hep Prf-Ac
 
 
 
 
 Observa  Value  Referen  Units  Interpr  Notes  Date
 
 tion   ce    etation  
 
   Range    
 
 Hepatit  Negativ  Negativ  No   No   No    
 
 is B   e  e  informa  informa  informa  2017 
 
 virus     tion in  tion in  tion in  9:05 AM
 
 surface     source   source   source 
 
  Ag      data   data   data 
 
 [Presen      
 
 ce] in       
 
 Serum       
 
 by       
 
 Immunoa      
 
 ssay      
 
 Hep B   Negativ  Negativ  No   No   No    
 
 Core   e  e  informa  informa  informa  2017 
 
 IgM    tion in  tion in  tion in  9:05 AM
 
     source   source   source 
 
     data   data   data 
 
 Hepatit  Negativ  Negativ  No   No   No    
 
 is A   e  e  informa  informa  informa  2017 
 
 virus     tion in  tion in  tion in  9:04 AM
 
 Ab      source   source   source 
 
 [Units/     data   data   data 
 
 volume]      
 
  in       
 
 Serum       
 
 by       
 
 Radioim      
 
 munoass      
 
 ay       
 
 (MANNY)      
 
 Hep C   Positiv  Negativ  No   Abnorma  High    
 
 Ab  e  e  informa  l  signal/  2017 
 
    tion in   cutoff   9:21 AM
 
     source   ratio  
 
     data   (>= 5,  
 
      Archite 
 
      ct  
 
      Anti-HC 
 
      V). [facility] 
 
      are  
 
      Laborat 
 
      ory  
 
      recomme 
 
      nds  
 
      collect 
 
      ing a  
 
      new  
 
      specime 
 
      n and  
 
      testing 
 
       for  
 
      Hepatit 
 
      is C  
 
      RNA  
 
      Quantit 
 
      ative  
 
      PCR to  
 
      confirm 
 
        
 
      positiv 
 
      ity and 
 
        
 
      provide 
 
       a  
 
      baselin 
 
      e viral 
 
       load  
 
      for  
 
      monitor 
 
      ing  
 
      treatme 
 
      nt  
 
      efficac 
 
      y. 
 
 
 
 
 Mg
 
 
 
 
 Observa  Value  Referen  Units  Interpr  Notes  Date
 
 tion   ce    etation  
 
   Range    
 
 Magnesi  2.0  1.6 -   mg/dL  No   No    
 
 um    2.4   informa  informa   
 
 [Moles/     tion in  tion in  6:37 PM
 
 volume]      source   source 
 
  in       data   data 
 
 Serum       
 
 or       
 
 Plasma      
 
 
 
 
 TSH
 
 
 
 
 Observa  Value  Referen  Units  Interpr  Notes  Date
 
 tion   ce    etation  
 
   Range    
 
 Thyrotr  0.516  0.270 -  mcIU/mL  No   No    
 
 opin     4.200   informa  informa   
 
 [Units/     tion in  tion in  6:37 PM
 
 volume]      source   source 
 
  in       data   data 
 
 Serum       
 
 or       
 
 Plasma      
 
 
 
 
 GGT
 
 
 
 
 Observa  Value  Referen  Units  Interpr  Notes  Date
 
 tion   ce    etation  
 
   Range    
 
 Gamma   52  5 - 36  IU/L  High  No    
 
 glutamy      informa  2017 
 
 l       tion in  6:37 PM
 
 transfe       source 
 
 rase        data 
 
 [Enzyma      
 
 tic       
 
 activit      
 
 y/volum      
 
 e] in       
 
 Serum       
 
 or       
 
 Plasma      
 
 
 
 
 Hemogram
 
 
 
 
 Observa  Value  Referen  Units  Interpr  Notes  Date
 
 tion   ce    etation  
 
   Range    
 
 LEUKOCY  9.7  4.0 -   x10(3)/  No   No    
 
 KAREN   11.0  mcL  informa  informa  2017 
 
     tion in  tion in  3:55 PM
 
      source   source 
 
      data   data 
 
 Erythro  3.86  3.80 -   x10(6)/  No   No    
 
 cytes    5.10  mcL  informa  informa   
 
 [#/volu     tion in  tion in  3:55 PM
 
 me] in       source   source 
 
 Blood       data   data 
 
 by       
 
 Automat      
 
 ed       
 
 count      
 
 Hemoglo  11.7  12.0 -   gm/dL  Low  No    
 
 bin    15.6    informa   
 
 [Mass/v      tion in  3:55 PM
 
 olume]        source 
 
 in        data 
 
 Blood      
 
 Hematoc  35.9  35.7 -   %  No   No    
 
 rit    45.9   informa  informa   
 
 [Volume     tion in  tion in  3:55 PM
 
        source   source 
 
 Fractio      data   data 
 
 n] of       
 
 Blood       
 
 by       
 
 Automat      
 
 ed       
 
 count      
 
 Erythro  93.2  82.5 -   fL  No   No    
 
 cyte    99.8   informa  informa  2017 
 
 mean      tion in  tion in  3:55 PM
 
 corpusc      source   source 
 
 ular       data   data 
 
 volume       
 
 [Entiti      
 
 c       
 
 volume]      
 
  by       
 
 Automat      
 
 ed       
 
 count      
 
 Erythro  30.2  27.0 -   pg  No   No    
 
 cyte    34.3   informa  informa   
 
 mean      tion in  tion in  3:55 PM
 
 corpusc      source   source 
 
 ular       data   data 
 
 hemoglo      
 
 bin       
 
 [Entiti      
 
 c mass]      
 
  by       
 
 Automat      
 
 ed       
 
 count      
 
 Erythro  32.4  32.1 -   gm/dL  No   No    
 
 cyte    35.3   informa  informa  2017 
 
 mean      tion in  tion in  3:55 PM
 
 corpusc      source   source 
 
 ular       data   data 
 
 hemoglo      
 
 bin       
 
 concent      
 
 ration       
 
 [Mass/v      
 
 olume]       
 
 by       
 
 Automat      
 
 ed       
 
 count      
 
 Erythro  15.1  11.5 -   %  High  No    
 
 cyte    15.0    informa  2017 
 
 distrib      tion in  3:55 PM
 
 ution        source 
 
 width        data 
 
 [Ratio]      
 
  by       
 
 Automat      
 
 ed       
 
 count      
 
 Platele  234  144 -   x10(3)/  No   No    
 
 ts    423  mcL  informa  informa  2017 
 
 [#/volu     tion in  tion in  3:55 PM
 
 me] in       source   source 
 
 Blood       data   data 
 
 by       
 
 Automat      
 
 ed       
 
 count      
 
 MPV  8.3  6.8 -   fL  No   No    
 
   10.8   informa  informa  2017 
 
     tion in  tion in  3:55 PM
 
      source   source 
 
      data   data 
 
 
 
 
 UA
 
 
 
 
 Observa  Value  Referen  Units  Interpr  Notes  Date
 
 tion   ce    etation  
 
   Range    
 
 UA   Dorinda  No   No   No   No    
 
 Color   informa  informa  informa  informa  2017 
 
   tion in  tion in  tion in  tion in  8:39 PM
 
    source   source   source   source 
 
    data   data   data   data 
 
 UA   Cloudy  Clear  No   Abnorma  No    
 
 Appear    informa  l  informa  2017 
 
    tion in   tion in  8:39 PM
 
     source    source 
 
     data    data 
 
 UA   Negativ  Negativ  No   No   No    
 
 Glucose  e  e  informa  informa  informa  2017 
 
    tion in  tion in  tion in  8:39 PM
 
     source   source   source 
 
     data   data   data 
 
 UA   Negativ  Negativ  No   No   No    
 
 Ketones  e  e  informa  informa  informa   
 
    tion in  tion in  tion in  8:39 PM
 
     source   source   source 
 
     data   data   data 
 
 UA   Negativ  Negativ  No   No   No    
 
 Blood  e  e  informa  informa  informa  2017 
 
    tion in  tion in  tion in  8:39 PM
 
     source   source   source 
 
     data   data   data 
 
 UA pH  5.0  4.8 -   No   No   Referen   
 
   8.0  informa  informa  ce   2017 
 
    tion in  tion in  range   8:39 PM
 
     source   source  valid  
 
     data   data  for  
 
      random  
 
      specime 
 
      ns  
 
      only. 
 
 UA   100   Negativ  No   Abnorma  No    
 
 Protein  mg/dl  e  informa  l  informa   
 
    tion in   tion in  8:39 PM
 
     source    source 
 
     data    data 
 
 UA   2 mg/dl  <=1   No   Abnorma  No    
 
 Urobili   mg/dl  informa  l  informa  2017 
 
 nogen    tion in   tion in  8:39 PM
 
     source    source 
 
     data    data 
 
 UA   Negativ  Negativ  No   No   No    
 
 Nitrite  e  e  informa  informa  informa  2017 
 
    tion in  tion in  tion in  8:39 PM
 
     source   source   source 
 
     data   data   data 
 
 UA Leuk  Large  Negativ  No   Abnorma  No    
 
  Est   e  informa  l  informa  2017 
 
    tion in   tion in  8:39 PM
 
     source    source 
 
     data    data 
 
 UA Spec  1.028  1.001 -  No   No   Referen   
 
  Grav    1.035  informa  informa  ce   2017 
 
    tion in  tion in  range   8:39 PM
 
     source   source  valid  
 
     data   data  for  
 
      random  
 
      specime 
 
      ns  
 
      only. 
 
 UA WBC  39  0 - 4  /HPF  High  No    
 
      informa  2017 
 
      tion in  8:39 PM
 
       source 
 
       data 
 
 UA RBC  4  0 - 3  /HPF  High  No    
 
      informa  2017 
 
      tion in  8:39 PM
 
       source 
 
       data 
 
 UA   4+  No   No   No   No    
 
 Squam    informa  informa  informa  informa  2017 
 
 Epi   tion in  tion in  tion in  tion in  8:39 PM
 
    source   source   source   source 
 
    data   data   data   data 
 
 UA   3+  No   No   No   No    
 
 Mucous   informa  informa  informa  informa  2017 
 
   tion in  tion in  tion in  tion in  8:39 PM
 
    source   source   source   source 
 
    data   data   data   data 
 
 UA   Trace  No   No   Abnorma  No    
 
 Bacteri   informa  informa  l  informa  2017 
 
 a   tion in  tion in   tion in  8:39 PM
 
    source   source    source 
 
    data   data    data 
 
 UA CA   Few  No   No   No   No    
 
 Ox Flor   informa  informa  informa  informa  2017 
 
   tion in  tion in  tion in  tion in  8:39 PM
 
    source   source   source   source 
 
    data   data   data   data

## 2017-10-07 NOTE — EXTERNAL MEDICAL SUMMARY RPT
Patient Summary
 Created on: 10/05/2017
 
BERYL MANN
External Reference #: [id number]
: 68
Sex: Female
 
Demographics
 
 
 
 Address  [address], [place] [address]
 
 Home Phone  [phone]
 
 Preferred Language  Unknown
 
 Marital Status  Unknown
 
 [employer] Affiliation  Unknown
 
 Race  White
 
 Ethnic Group  Unknown
 
 
Author
 
 
 
 Author            ,            SAMIR DAWSON
 
 Address  Unknown
 
 Phone  [email or web address]
 
 
 
Support
 
 
 
 Name  Relationship  Address  Phone
 
 MACKENZIE,            Next Of Kin  [address]   +1 
 
   DANIEL CLAIREECDEENA,   [phone]
 
   [place]  [address] 
 
 
 
Care Team Providers
 
 
 
 Care Team Member Name  Role  Phone
 
 VINICIO OCOK,   Unavailable  Unavailable
 
 VINICIO COOK   
 
 ELLIS ALL, ELLIS ALL   Unavailable  Unavailable
 
 [facility]   Un[facility]  Unavailable
 
 [facility]   
 
 RAJESH PAMELA,   Unavailable  Unavailable
 
 RAJESH PAMELA   
 
 RAJESH PAMELA,   Unavailable  Unavailable
 
 RAJESH PAMELA   
 
 RAJESH, REYNA,   Unavailable  Unavailable
 
 RAJESH, REYNA   
 
 [facility]  Unavailable  Unavailable
 
  [facility]  
 
    
 
 [facility]   Unavailable  Unavailable
 
 [place]   
 
 [facility] [place]  
 
    
 
 [facility]   Unavailable  Unavailable
 
 [place]  
 
  [facility] [place]  
 
    
 
 [facility]   Unavailable  Unavailable
 
 HEATHER ABNER, HEATHER   Unavailable  Unavailable
 
 ABNER   
 
 PITO ROMERO S,   Unavailable  Unavailable
 
 PITO ROMERO S   
 
 SHAHLA CRUZ,   Unavailable  Unavailable
 
 SHAHLA CRUZ   
 
 [facility]   Unavailable  Unavailable
 
 [facility]   
 
 [facility]   
 
 DANIEL MULLEN,   Unavailable  Unavailable
 
 DANIEL MULLEN   
 
 [facility],  Unavailable  Unavailable
 
  [facility]  
 
    
 
 [facility]   Unavailable  Unavailable
 
 [facility], [facility]   
 
 VÍCTOR GRAVES   Unavailable  Unavailable
 
 VÍCTOR GRAVES   Unavailable  Unavailable
 
 TASH THOMAS,   Unavailable  Unavailable
 
 TASH THOMAS EMMETT P,   Unavailable  Unavailable
 
 SHELLY DUQUE   
 
 [facility]   Unavailable  Unavailable
 
 [facility]   
 
 [facility]   
 
 [facility],   Unavailable  Unavailable
 
 [facility]   
 
 RENUSCH, RENUSCH   Unavailable  Unavailable
 
 GENA ARNOLD,   Unavailable  Unavailable
 
 GENA ARNOLD MD,   Unavailable  Unavailable
 
 ÁLVARO Yanez MD,   Unavailable  Unavailable
 
 ÁLVARO CORDERO A C, WRIGHT,   Unavailable  Unavailable
 
 ASA C   
 
                                            
 
Purpose
                      Continuity of Care Document - 2009 through 10-05-
2017                                                                            
                     
 
Problems
                      
 
 
 Code         Diagnosis    DOS          Provider     Status     
 
                                                               
 
               
 
 [id number]       CUTANEOUS   2017   [facility]              
 
              ABSCESS OF                PHYSICIANS,             
 
      RIGHT UPPER           PLLC                   
 
   LIMB                       
 
                  
 
      
 
 D649         ANEMIA   2015   [facility]              
 
              UNSPECIFIED               PHYSICIANS,             
 
                            PLLC                   
 
                          
 
      
 
 I10          ESSENTIAL   2015   [facility]              
 
              PRIMARY                [facility]              
 
      HYPERTENSIO          INC                     
 
  N                          
 
             
 
 [id number]       CELLULITIS   2015   [facility]              
 
              OF RIGHT                PHYSICIANS,             
 
      UPPER LIMB            PLLC                   
 
                              
 
             
 
 R945         ABNORMAL   2015   [facility]              
 
              RESULTS OF                [facility]              
 
      LIVER           INC                     
 
  FUNCTION                 
 
  STUDIES       
 
                
 
        
 
 Z720         TOBACCO USE  2015   [facility]              
 
                                        [facility]              
 
                       INC                     
 
                 
 
 [id number]       UNSPECIFIED  10-   [facility]              
 
               ASTHMA                [facility]              
 
      UNCOMPLICAT          INC                     
 
  ED                         
 
              
 
 32336        ASTHMA,   2015   [facility]              
 
              UNSPECIFIED               PHYSICIANS              
 
      ,           GROUP                   
 
  UNSPECIFIED                 
 
   STATUS         
 
                
 
        
 
 39131        OTHER   2015   [facility]              
 
              MALAISE AND               PHYSICIANS              
 
       FATIGUE             GROUP                   
 
                              
 
           
 
 7905         OTHER   2015   [facility]              
 
              NONSPECIFIC               PHYSICIANS              
 
       ABNORMAL           GROUP                   
 
  SERUM                  
 
  ENZYME      
 
  LEVELS        
 
                
 
       
 
 55393        DEGEN   2015   [place]              
 
              LUMBAR/LUMB               MEDICAL              
 
      OSACRAL           IMAGING ASS             
 
  INTERVERTEB                 
 
  RAL DISC              
 
                
 
         
 
 7245         UNSPECIFIED  2015   [place]              
 
               BACKACHE                 MEDICAL              
 
                           IMAGING ASS             
 
                        
 
            
 
 7804         DIZZINESS   2015   [place]              
 
              AND                MEDICAL              
 
      GIDDINESS            IMAGING ASS             
 
                              
 
                  
 
 51854        SHORTNESS   2015   [place]              
 
              OF BREATH                 MEDICAL              
 
                           IMAGING ASS             
 
                        
 
            
 
 2859         UNSPECIFIED  2014   [facility]                
 
               ANEMIA                                           
 
                                          
 
          
 
 6202         OTHER AND   2014   [facility]              
 
              UNSPECIFIED               [facility]AMELA                     
 
     OVARIAN                                  
 
  CYST            
 
                
 
 3384         CHRONIC   2010   JOYCELYN COOK                VINICIO W               
 
    SYNDROME                                     
 
                        
 
         
 
 V154         PERS HX   2010   [facility]              
 
              PSYCHOLOGIC               [facility]             
 
    AL TRAUMA                                   
 
  PRS HAZARDS             
 
   HEALTH       
 
                
 
        
 
 21033        REGULAR   2010   [facility]              
 
              ASTIGMATISM               VISION                  
 
                                                 
 
                 
 
 20069        UNSPECIFIED  2010   [facility]ANS              
 
                              SERVICES              
 
    ARTHROPATHY          PSC                     
 
   OTHER                 
 
  SPECIFIED    
 
  SITES         
 
                
 
      
 
 27734        DISPLCMT   2010   [facility]              
 
              LUMBAR                SERVICES              
 
    INTERVERT           PSC                     
 
  DISC W/O                 
 
  MYELOPATHY    
 
                
 
           
 
 7242         LUMBAGO      2010   [facility]ANS              
 
                                        SERVICES              
 
                 PSC                     
 
                 
 
 17388        SPINAL STEN  2010   [facility]              
 
               LUMB REG                [facility]              
 
    W/O           INC                     
 
  NEUROGENIC                 
 
  CLAUDICATIO   
 
  N             
 
             
 
 V571         OTHER   2010   [facility]              
 
              PHYSICAL                [facility]              
 
    THERAPY              INC                     
 
                             
 
        
 
 08461        CONTUSION   2010   [facility]              
 
              OF BACK                   [facility]              
 
                         INC                     
 
                     
 
 8472         LUMBAR   2010   [facility]              
 
              SPR[facility] AND                EMERGENCY              
 
    STRAIN               SERVICES              
 
                ASSOCIATES    
 
                   
 
           
 
         PLACE OF   2010   [place]              
 
              OCCURRENCE,               MEDICAL              
 
     HOME                IMAGING              
 
                ASSOCIATES    
 
                  
 
           
 
         FALL FROM   2010   [place]              
 
              OTHER                MEDICAL              
 
    SLIPPING           IMAGING              
 
  TRIPPING OR   ASSOCIATES    
 
   [facility]                
 
                       
 
           
 
 4660         ACUTE   10-   [place],              
 
              BRONCHITIS                VINICIO W               
 
                                                 
 
                   
 
 5276         MUCOCELE OF  10-   [facility]              
 
               SALIVARY                PATHOLOGY              
 
    GLAND                LABORATORY              
 
                              
 
             
 
 25998        DENTAL   2009   [facility],              
 
              CARIES                DANIEL COOPER                
 
    EXTENDING                                   
 
  INTO PULP            
 
                
 
          
 
 5220         PULPITIS     2009   D[facility]                
 
                                          
 
           
 
 49050        TOOTH   2009   [facility],              
 
              BROKEN FX                DANIEL COOPER                
 
    DUE TO                                   
 
  TRAUMA W/O           
 
  MENTION    
 
  COMP          
 
                
 
 [id number]    Cannabis                [place]              
 
              type drug                [facility]                
 
                                  
 
                
 
 [id number]     Narcotic                [place]              
 
              drug user                 [facility]                
 
                            
 
         
 
 780.2        Syncope                   [facility]                
 
                      
 
         
 
 D64.9        ANEMIA,                                        
 
              UNSPECIFIED                                       
 
                                                      
 
                      
 
 F11.20       Opioid                                        
 
              dependence,                                       
 
                                             
 
  uncomplicat             
 
  ed            
 
              
 
 F12.20       Cannabis                                        
 
              dependence,                                       
 
                                             
 
  uncomplicat             
 
  ed            
 
              
 
 F17.200      NICOTINE                                        
 
              DEPENDENCE,                                       
 
                                              
 
  UNSPECIFIED             
 
  ,    
 
  UNCOMPLICAT   
 
  ED            
 
              
 
 F19.10       OTHER                                        
 
              PSYCHOACTIV                                       
 
     E SUBSTANCE                                       
 
   ABUSE,              
 
  UNCOMPLICAT   
 
  ED            
 
              
 
 F19.20       OTHER                                        
 
              PSYCHOACTIV                                       
 
     E SUBSTANCE                                       
 
                
 
  DEPENDENCE,   
 
      
 
  UNCOMPLICAT   
 
  ED            
 
              
 
 F19.90       OTHER                                        
 
              PSYCHOACTIV                                       
 
     E SUBSTANCE                                       
 
   USE,              
 
  UNSPECIFIED   
 
  ,    
 
  UNCOMPLICAT   
 
  ED            
 
              
 
 L02.91       CUTANEOUS                                        
 
              ABSCESS,                                        
 
     UNSPECIFIED                                       
 
                          
 
            
 
 L03.90       CELLULITIS,                                       
 
                                                      
 
     UNSPECIFIED                                       
 
                          
 
            
 
 R10.9        UNSPECIFIED                                       
 
               ABDOMINAL                                        
 
    PAIN                                              
 
                          
 
 R79.89       OTHER                                        
 
              SPECIFIED                                        
 
     ABNORMAL                                        
 
  FINDINGS OF             
 
   BLOOD    
 
  CHEMISTRY     
 
                
 
          
 
                                                                                
                                                                                
                                                                                
                                                                                
                                                                                
                                                                                
                                                                        
 
Allergies, Adverse Reactions, Alerts
                      
 
 
 Type                
 
 Drug Allergy                
 
            Adverse Reaction to Substance            
 
 
 Substance              Reaction               Severity             
 
                   
 
 Naproxen               I-RASH                 Intermediate         
 
                    
 
 Codeine                I-RASH                 Intermediate         
 
                   
 
                                                                                
                           
 
Medications
                      
 
 
 Na  ND  Rx  Da  Fi  Fi  Am  Da  Di  Ph  RX  Ph  St
 
 me  C   No  te  ll  ll  ou  ys  ag  ar   #  ys  at
 
         rm        s   nt      no  ma      ic  us
 
             Or  Da              si  cy      ia    
 
             de  te              s           n     
 
             re                                    
 
             d                                     
 
                                                   
 
                                                   
 
                                                   
 
                                                  
 
                                   
 
                           
 
                      
 
               
 
              
 
 HI  68      09  09      30  8           00  [place]  Ac
 
 OM  38      -0  -2      .0              00  L-  ti
 
 ET  20      4  9-      00              07  MA  ve
 
 HA  04      20  20                      50  RT    
 
 ZI  10      17  17                      75       
 
 NE  1                                   29  PH    
 
                                            AR    
 
 25                                          MA    
 
                                            CY    
 
 MG                                             
 
                                      #5    
 
 TA                                    91 
 
 BL                                    
 
 ET                                    
 
                                
 
                           
 
                           
 
                           
 
                  
 
                  
 
                  
 
               
 
               
 
 HY  00      09  09      10  3           00  [place]  Ac
 
 DR  40      -0  -2      .0              00  L-  ti
 
 OC  60      4-  9-      00              02  MA  ve
 
 OD  12      20  20                      24  RT    
 
 ON  50      17  17                      17       
 
 -A  1                                   72  PH    
 
 CE                                          AR    
 
 TA                                          MA    
 
 MI                                          CY    
 
 NO                                             
 
 PH                                    #5    
 
 N                                     91 
 
 10                                    
 
 -3                                    
 
 25                             
 
                           
 
                           
 
                           
 
                  
 
                  
 
                  
 
               
 
               
 
               
 
 JAIME  53      09  09      20  10          00  [place]  Ac
 
 LF  74      -0  -2      .0              00  L-  ti
 
 AM  60      4-  9-      00              07  MA  ve
 
 ET  27      20  20                      50  RT    
 
 HO  20      17  17                      75       
 
 XA  5                                   30  PH    
 
 ZO                                          AR    
 
 LE                                          MA    
 
 -T                                          CY    
 
 MP                                              
 
                                      #5    
 
 DS                                    91 
 
                                      
 
 TA                                    
 
 BL                             
 
 ET                        
 
                           
 
                           
 
                  
 
                  
 
                  
 
               
 
               
 
               
 
               
 
 TR  57      09  09      6.  3           00  HO  Ac
 
 AM  66      -0  -2      00              00  ME  ti
 
 AD  40      6-  9-      0               04  TO  ve
 
 OL  37      20  20                      02  WN    
 
    71      17  17                      44       
 
 HC  8                                   70  PH    
 
 L                                           AR    
 
 50                                          MA    
 
                                            CY    
 
 MG                                             
 
                                      OF    
 
 TA                                     
 
 BL                                 CY 
 
 ET                                 NT 
 
                             HI 
 
                        AN 
 
                        A  
 
                           
 
                  
 
                  
 
                  
 
                  
 
                  
 
               
 
               
 
              
 
 CL  00      08  09      40  10          00  HO  Ac
 
 IN  59      -2  -2      .0              00  ME  ti
 
 DA  12      9-  2-      00              06  TO  ve
 
 MY  93      20  20                      09  WN    
 
 CI  20      17  17                      32       
 
 N   1                                   77  PH    
 
 HC                                          AR    
 
 L                                           MA    
 
 30                                          CY    
 
 0                                               
 
 MG                                    OF    
 
                                        
 
 CA                                 CY 
 
 PS                                 NT 
 
 UL                          HI 
 
 E                      AN 
 
                        A  
 
                           
 
                  
 
                  
 
                  
 
                  
 
                  
 
                  
 
                 
 
              
 
 OX  53      08  09      10  2           00  HO  Ac
 
 YC  74      -2  -2      .0              00  ME  ti
 
 OD  60      9-  2-      00              02  TO  ve
 
 ON  20      20  20                      01  WN    
 
 E-  30      17  17                      44       
 
 AC  5                                   93  PH    
 
 ET                                          AR    
 
 AM                                          MA    
 
 IN                                          CY    
 
 OP                                             
 
 HE                                    OF    
 
 N                                       
 
 5-                                 CY 
 
 32                                 NT 
 
 5                           HI 
 
                        AN 
 
                        A  
 
                           
 
                  
 
                  
 
                  
 
                  
 
                  
 
                 
 
               
 
              
 
 SO  00          07          0                   No
 
 DI  40          -0                               
 
 UM  97          3-                              Lo
 
    98          20                              ng
 
 CH  30          13                              er
 
 LO  9                                            
 
 RI                                              Ac
 
 DE                                              ti
 
                                                ve
 
 0.                                              
 
 9%                                          
 
                                            
 
 SO                                          
 
 NOMAN                                          
 
 TI                                      
 
 ON                                    
 
                                       
 
                                    
 
               
 
               
 
               
 
               
 
               
 
               
 
               
 
 Sa  63          07          0                   No
 
 li  80          -0                               
 
 ne  70          3-                              Lo
 
    10          20                              ng
 
 Fl  07          13                              er
 
 us  5                                            
 
 h                                               Ac
 
 10                                              ti
 
 ML                                              ve
 
                                                
 
 Sy                                          
 
 ri                                          
 
 ng                                          
 
 e                                           
 
                                         
 
                                       
 
                                       
 
                                    
 
               
 
               
 
               
 
               
 
              
 
 AL  00      03  05  5   90  30      EA  16  AR  Ac
 
 HI  78      -1  -1      .0          ST  82  NO  ti
 
 AZ  11      7-  7-      00          SI  20  LD  ve
 
 OL  08      20  20                  DE           
 
 AM  90      10  10                         RI    
 
  2  5                               PH      CH    
 
                                    AR      AR    
 
 MG                                  MA      D     
 
                                    CY      W     
 
 TA                                             
 
 BL                         OF            
 
 ET                                   
 
                         CY      
 
                         NT      
 
                  HI      
 
                AN      
 
                A       
 
                       
 
                  
 
                  
 
                  
 
               
 
               
 
               
 
               
 
              
 
     00      03  05  1   15  25      EA  16  GI  Ac
 
     59      -2  -1      0.          ST  98  LB  ti
 
     10      9-  7-      00          SI  21  ER  ve
 
     38      20  20      0           DE      T     
 
     50      10  10                         [place]    
 
     1                               PH      HN    
 
                                     AR       W    
 
                                     MA            
 
                                     CY            
 
                                                
 
                            OF            
 
                                      
 
                           CY      
 
                         NT      
 
                  HI      
 
                AN      
 
                A    
 
                     
 
               
 
               
 
               
 
               
 
               
 
               
 
               
 
              
 
 AL  00      03  04  5   90  30      EA  16  AR  Ac
 
 HI  78      -1  -1      .0          ST  82  NO  ti
 
 AZ  11      7-  7-      00          SI  20  LD  ve
 
 OL  08      20  20                  DE           
 
 AM  90      10  10                         RI    
 
  2  5                               PH      CH    
 
                                    AR      AR    
 
 MG                                  MA      D     
 
                                    CY      W     
 
 TA                                             
 
 BL                         OF            
 
 ET                                   
 
                         CY      
 
                         NT      
 
                  HI      
 
                AN      
 
                A       
 
                       
 
                  
 
                  
 
                  
 
               
 
               
 
               
 
               
 
              
 
     59      10  04  11  8.  20      EA  14  AR  Ac
 
     31      -2  -1      50          ST  82  NO  ti
 
     00      3-  6-      0           SI  09  LD  ve
 
     57      20  20                  DE           
 
     92      09  10                         RI    
 
     0                               PH      CH    
 
                                     AR      AR    
 
                                     MA      D     
 
                                     CY      W     
 
                                                 
 
                            OF            
 
                                     
 
                         CY      
 
                         NT      
 
                  HI      
 
                AN      
 
                A       
 
                       
 
               
 
               
 
               
 
               
 
               
 
               
 
               
 
              
 
 TI  00      03  03  1   90  30      EA  16  GI  Ac
 
 ZA  18      -3  -3      .0          ST  99  LB  ti
 
 NI  54      0-  0-      00          SI  57  ER  ve
 
 DI  40      20  20                  DE      T     
 
 NE  02      10  10                         [place]    
 
    3                               PH      HN    
 
 HC                                  AR       W    
 
 L                                   MA            
 
 4                                   CY            
 
 MG                                             
 
                           OF            
 
 TA                                   
 
 BL                      CY      
 
 ET                      NT      
 
                  HI      
 
                AN      
 
                A    
 
                     
 
                  
 
                  
 
                  
 
                  
 
                  
 
               
 
               
 
              
 
     00      03  03  1   15  25      EA  16  GI  Ac
 
     59      -2  -2      0.          ST  98  LB  ti
 
     10      9-  9-      00          SI  21  ER  ve
 
     38      20  20      0           DE      T     
 
     50      10  10                         [place]    
 
     1                               PH      HN    
 
                                     AR       W    
 
                                     MA            
 
                                     CY            
 
                                                
 
                            OF            
 
                                      
 
                           CY      
 
                         NT      
 
                  HI      
 
                AN      
 
                A    
 
                     
 
               
 
               
 
               
 
               
 
               
 
               
 
               
 
              
 
 AL  00      03  03  5   90  30      EA  16  AR  Ac
 
 HI  78      -1  -1      .0          ST  82  NO  ti
 
 AZ  11      7-  7-      00          SI  20  LD  ve
 
 OL  08      20  20                  DE           
 
 AM  90      10  10                         RI    
 
  2  5                               PH      CH    
 
                                    AR      AR    
 
 MG                                  MA      D     
 
                                    CY      W     
 
 TA                                             
 
 BL                         OF            
 
 ET                                   
 
                         CY      
 
                         NT      
 
                  HI      
 
                AN      
 
                A       
 
                       
 
                  
 
                  
 
                  
 
               
 
               
 
               
 
               
 
              
 
 TI  00      03  03  0   60  30      EA  16  MI  Ac
 
 ZA  18      -0  -0      .0          ST  57  CK  ti
 
 NI  54      1-  1-      00          SI  54     ve
 
 DI  40      20  20                  DE      GR    
 
 NE  02      10  10                         EG    
 
    3                               PH      OR    
 
 HC                                  AR      Y     
 
 L                                   MA      E     
 
 4                                   CY            
 
 MG                                             
 
                           OF            
 
 TA                                   
 
 BL                      CY      
 
 ET                      NT      
 
                  HI      
 
                AN      
 
                A      
 
                     
 
                  
 
                  
 
                  
 
                  
 
                  
 
               
 
               
 
              
 
 LI  63      03  03  5   60  30      EA  16  MI  Ac
 
 DO  48      -0  -0      .0          ST  57  CK  ti
 
 DE  10      1-  1-      00          SI  56     ve
 
 RM  68      20  20                  DE      GR    
 
    70      10  10                         EG    
 
 5%  6                               PH      OR    
 
                                    AR      Y     
 
 PA                                  MA      E     
 
 TC                                  CY            
 
 H                                              
 
                            OF            
 
                                      
 
                         CY      
 
                         NT      
 
                  HI      
 
                AN      
 
                A      
 
                     
 
                 
 
               
 
               
 
               
 
               
 
               
 
               
 
              
 
 AL  67      02  02  00  90  30      EA  16  AR  Ac
 
 HI  25      -1  -2      .0          ST  43  NO  ti
 
 AZ  30      9-  6-      00          SI  25  LD  ve
 
 OL  90      20  20                  DE           
 
 AM  11      10  10                         RI    
 
    1                               PH      CH    
 
 0.                                  AR      AR    
 
 5                                   MA      D     
 
 MG                                  CY      W     
 
                                                
 
 TA                         OF            
 
 BL                         CY         
 
 ET                      NT      
 
                         HI      
 
                  AN      
 
                A       
 
                        
 
                       
 
                  
 
                  
 
                  
 
                  
 
               
 
               
 
              
 
     59      10  12  01  8.  15      EA  14  AR  Ac
 
     31      -2  -0      50          ST  82  NO  ti
 
     00      3-  3-      0           SI  09  LD  ve
 
     57      20  20                  DE           
 
     92      09  09                         RI    
 
     0                               PH      CH    
 
                                     AR      AR    
 
                                     MA      D     
 
                                     CY      W     
 
                                                 
 
                            OF            
 
                           CY         
 
                         NT      
 
                         HI      
 
                  AN      
 
                A       
 
                        
 
                       
 
               
 
               
 
               
 
               
 
               
 
               
 
              
 
     59      10  11  00  8.  15      EA  14  AR  Ac
 
     31      -2  -0      50          ST  82  NO  ti
 
     00      3-  5-      0           SI  09  LD  ve
 
     57      20  20                  DE           
 
     92      09  09                         RI    
 
     0                               PH      CH    
 
                                     AR      AR    
 
                                     MA      D     
 
                                     CY      W     
 
                                                 
 
                            OF            
 
                           CY         
 
                         NT      
 
                         HI      
 
                  AN      
 
                A       
 
                        
 
                       
 
               
 
               
 
               
 
               
 
               
 
               
 
              
 
 AZ  00      10  10  00  6.  6       EA  14  AR  Ac
 
 IT  09      -0  -2      00          ST  56  NO  ti
 
 HR  37      5-  2-      0           SI  82  LD  ve
 
 OM  14      20  20                  DE           
 
 YC  61      09  09                         RI    
 
 IN  8                               PH      CH    
 
                                    AR      AR    
 
 25                                  MA      D     
 
 0                                   CY      W     
 
 MG                                             
 
                           OF            
 
 TA                        CY         
 
 BL                      NT      
 
 ET                      HI      
 
                  AN      
 
                A       
 
                        
 
                       
 
                  
 
                  
 
                  
 
                  
 
                  
 
               
 
              
 
     00      10  10  00  60  20      EA  14  AR  Ac
 
     25      -0  -2      .0          ST  56  NO  ti
 
     83      5-  2-      00          SI  81  LD  ve
 
     67      20  20                  DE           
 
     80      09  09                         RI    
 
     1                               PH      CH    
 
                                     AR      AR    
 
                                     MA      D     
 
                                     CY      W     
 
                                                 
 
                            OF            
 
                            CY         
 
                         NT      
 
                         HI      
 
                  AN      
 
                A       
 
                        
 
                       
 
               
 
               
 
               
 
               
 
               
 
               
 
              
 
 IN  00      07  10  02  60  20      EA  13  MO  Ac
 
 DO  78      -1  -2      .0          ST  52  SE  ti
 
 ME  12      7-  2-      00          SI  24  S   ve
 
 TH  32      20  20                  DE      ST    
 
 AC  51      09  09                         EP    
 
 IN  0                               PH      HE    
 
                                    AR      N     
 
 25                                  MA      A     
 
                                    CY            
 
 MG                                              
 
                           OF            
 
 CA                         CY         
 
 PS                      NT      
 
 UL                      HI      
 
 E                AN      
 
                A       
 
                       
 
                     
 
                  
 
                  
 
                  
 
                  
 
                  
 
                 
 
              
 
 AM  00      09  10  00  15  5       EA  14  HE  Ac
 
 OX  78      -2  -0      .0          ST  43  ND  ti
 
 IC  12      5-  8-      00          SI  14  ER  ve
 
 IL  61      20  20                  DE      SO    
 
 LI  30      09  09                         N     
 
 N   5                               PH      RO    
 
 50                                  AR      BE    
 
 0                                   MA      RT    
 
 MG                                  CY       W    
 
                                               
 
 CA                         OF            
 
 PS                         CY         
 
 UL                      NT      
 
 E                       HI      
 
                  AN      
 
                A       
 
                        
 
                        
 
                  
 
                  
 
                  
 
                  
 
                 
 
               
 
              
 
     00      09  10  00  20  3       EA  14  HE  Ac
 
     05      -2  -0      .0          ST  43  ND  ti
 
     44      5-  8-      00          SI  13  ER  ve
 
     65      20  20                  DE      SO    
 
     02      09  09                         N     
 
     9                               PH      RO    
 
                                     AR      BE    
 
                                     MA      RT    
 
                                     CY       W    
 
                                                
 
                            OF            
 
                            CY         
 
                         NT      
 
                         HI      
 
                  AN      
 
                A       
 
                        
 
                        
 
               
 
               
 
               
 
               
 
               
 
               
 
              
 
 IN  00      07  09  01  60  20      EA  13  MO  Ac
 
 DO  78      -1  -1      .0          ST  52  SE  ti
 
 ME  12      7-  0-      00          SI  24  S   ve
 
 TH  32      20  20                  DE      ST    
 
 AC  51      09  09                         EP    
 
 IN  0                               PH      HE    
 
                                    AR      N     
 
 25                                  MA      A     
 
                                    CY            
 
 MG                                              
 
                           OF            
 
 CA                         CY         
 
 PS                      NT      
 
 UL                      HI      
 
 E                AN      
 
                A       
 
                       
 
                     
 
                  
 
                  
 
                  
 
                  
 
                  
 
                 
 
              
 
 IN  00      07  07  00  60  20      EA  13  MO  Ac
 
 DO  78      -1  -3      .0          ST  52  SE  ti
 
 ME  12      7-  0-      00          SI  24  S   ve
 
 TH  32      20  20                  DE      ST    
 
 AC  51      09  09                         EP    
 
 IN  0                               PH      HE    
 
                                    AR      N     
 
 25                                  MA      A     
 
                                    CY            
 
 MG                                              
 
                           OF            
 
 CA                         CY         
 
 PS                      NT      
 
 UL                      HI      
 
 E                AN      
 
                A       
 
                       
 
                     
 
                  
 
                  
 
                  
 
                  
 
                  
 
                 
 
              
 
 IN  00      06  07  01  30  10      EA  13  MO  Ac
 
 DO  78      -1  -1      .0          ST  15  SE  ti
 
 ME  12      6-  6-      00          SI  69  S   ve
 
 TH  32      20  20                  DE      ST    
 
 AC  51      09  09                         EP    
 
 IN  0                               PH      HE    
 
                                    AR      N     
 
 25                                  MA      A     
 
                                    CY            
 
 MG                                              
 
                           OF            
 
 CA                         CY         
 
 PS                      NT      
 
 UL                      HI      
 
 E                AN      
 
                A       
 
                       
 
                     
 
                  
 
                  
 
                  
 
                  
 
                  
 
                 
 
              
 
 IN  00      06  07  00  30  10      EA  13  MO  Ac
 
 DO  78      -1  -0      .0          ST  15  SE  ti
 
 ME  12      6-  2-      00          SI  69  S   ve
 
 TH  32      20  20                  DE      ST    
 
 AC  51      09  09                         EP    
 
 IN  0                               PH      HE    
 
                                    AR      N     
 
 25                                  MA      A     
 
                                    CY            
 
 MG                                              
 
                           OF            
 
 CA                         CY         
 
 PS                      NT      
 
 UL                      HI      
 
 E                AN      
 
                A       
 
                       
 
                     
 
                  
 
                  
 
                  
 
                  
 
                  
 
                 
 
              
 
     00      06  07  00  60  20      EA  13  MO  Ac
 
     55      -1  -0      .0          ST  15  SE  ti
 
     50      6-  2-      00          SI  70  S   ve
 
     16      20  20                  DE      ST    
 
     30      09  09                         EP    
 
     2                               PH      HE    
 
                                     AR      N     
 
                                     MA      A     
 
                                     CY            
 
                                                 
 
                            OF            
 
                            CY         
 
                         NT      
 
                         HI      
 
                  AN      
 
                A       
 
                       
 
                     
 
               
 
               
 
               
 
               
 
               
 
               
 
              
 
     00      06  06  00  15  3       EA  13  HE  Ac
 
     59      -0  -1      .0          ST  00  ND  ti
 
     10      4  8-      00          SI  77  ER  ve
 
     38      20  20                  DE      SO    
 
     50      09  09                         N     
 
     1                               PH      RO    
 
                                     AR      BE    
 
                                     MA      RT    
 
                                     CY       W    
 
                                                
 
                            OF            
 
                            CY         
 
                         NT      
 
                         HI      
 
                  AN      
 
                A       
 
                        
 
                        
 
               
 
               
 
               
 
               
 
               
 
               
 
              
 
     00      05  05  00  20  3       EA  12  HE  Ac
 
     59      -0  -2      .0          ST  65  ND  ti
 
     10      7          SI  12  ER  ve
 
     38      20  20                  DE      SO    
 
     50      09  09                         N     
 
     1                               PH      RO    
 
                                     AR      BE    
 
                                     MA      RT    
 
                                     CY       W    
 
                                                
 
                            OF            
 
                            CY         
 
                         NT      
 
                         HI      
 
                  AN      
 
                A       
 
                        
 
                        
 
               
 
               
 
               
 
               
 
               
 
               
 
              
 
 HI  00      05  05  00  12  2       EA  12  HE  Ac
 
 OM  78      -0  -2      .0          ST  65  ND  ti
 
 ET  11      7          SI  13  ER  ve
 
 HA  83      20  20                  DE      SO    
 
 ZI  01      09  09                         N     
 
 NE  0                               PH      RO    
 
                                    AR      BE    
 
 25                                  MA      RT    
 
                                    CY       W    
 
 MG                                             
 
                           OF            
 
 TA                         CY         
 
 BL                      NT      
 
 ET                      HI      
 
                  AN      
 
                A       
 
                        
 
                        
 
                  
 
                  
 
                  
 
                  
 
                  
 
               
 
              
 
     00      05  05  00  15  3       EA  12  HE  Ac
 
     59      -0  -2      .0          ST  67  ND  ti
 
     10      8          SI  12  ER  ve
 
     54      20  20                  DE      SO    
 
     00      09  09                         N     
 
     1                               PH      RO    
 
                                     AR      BE    
 
                                     MA      RT    
 
                                     CY       W    
 
                                                
 
                            OF            
 
                            CY         
 
                         NT      
 
                         HI      
 
                  AN      
 
                A       
 
                        
 
                        
 
               
 
               
 
               
 
               
 
               
 
               
 
              
 
 AM  00      05  05  00  30  10      EA  12  ST  Ac
 
 OX  78      -0  -2      .0          ST  60  EP  ti
 
 IC  12      4-  1-      00          SI  38  HE  ve
 
 IL  61      20  20                  DE      NS    
 
 LI  30      09  09                              
 
 N   5                               PH      KE    
 
 50                                  AR      VI    
 
 0                                   MA      N     
 
 MG                                  CY      C     
 
                                                
 
 CA                         OF            
 
 PS                         CY         
 
 UL                      NT      
 
 E                       HI      
 
                  AN      
 
                A       
 
                        
 
                       
 
                  
 
                  
 
                  
 
                  
 
                 
 
               
 
              
 
     00      04  04  00  20  5       EA  12  ST  Ac
 
     17      -1  -2      .0          ST  31  EP  ti
 
     26      3-  3-      00          SI  07  HE  ve
 
     35      20  20                  DE      NS    
 
     97      09  09                              
 
     0                               PH      KE    
 
                                     AR      VI    
 
                                     MA      N     
 
                                     CY      C     
 
                                                
 
                            OF            
 
                            CY         
 
                         NT      
 
                         HI      
 
                  AN      
 
                A       
 
                        
 
                       
 
               
 
               
 
               
 
               
 
               
 
               
 
              
 
 AM  00      04  04  00  30  10      EA  12  ST  Ac
 
 OX  78      -0  -2      .0          ST  26  EP  ti
 
 IC  12      9-  3          SI  87  HE  ve
 
 IL  61      20  20                  DE      NS    
 
 LI  30      09  09                              
 
 N   5                               PH      KE    
 
 50                                  AR      VI    
 
 0                                   MA      N     
 
 MG                                  CY      C     
 
                                                
 
 CA                         OF            
 
 PS                         CY         
 
 UL                      NT      
 
 E                       HI      
 
                  AN      
 
                A       
 
                        
 
                       
 
                  
 
                  
 
                  
 
                  
 
                 
 
               
 
              
 
     00      04  04  00  20  5       EA  12  ST  Ac
 
     60      -0  -2      .0          ST  26  EP  ti
 
     35      9-  3-      00          SI  86  HE  ve
 
     46      20  20                  DE      NS    
 
     82      09  09                              
 
     8                               PH      KE    
 
                                     AR      VI    
 
                                     MA      N     
 
                                     CY      C     
 
                                                
 
                            OF            
 
                            CY         
 
                         NT      
 
                         HI      
 
                  AN      
 
                A       
 
                        
 
                       
 
               
 
               
 
               
 
               
 
               
 
               
 
              
 
 CY  59      04  04  00  21  7       CL  19  WR  Ac
 
 CL  74      -1  -2      .0          IN  16  IG  ti
 
 OB  60      3-  3-      00          IC  91  HT  ve
 
 EN  17      20  20                              
 
 ZA  70      09  09                  PH      AR    
 
 HI  6                               AR      DY    
 
 IN                                  MA       C    
 
 E                                   CY            
 
 10                                                
 
                                                
 
 MG                                       
 
                                      
 
 TA                              
 
 BL                              
 
 ET                       
 
                        
 
                     
 
                  
 
               
 
               
 
               
 
               
 
               
 
               
 
     00      04  04  00  21  7       CL  19  WR  Ac
 
     55      -1  -2      .0          IN  16  IG  ti
 
     51      3-  3-      00          IC  92  HT  ve
 
     88      20  20                              
 
     30      09  09                  PH      AR    
 
     2                               AR      DY    
 
                                     MA       C    
 
                                     CY            
 
                                                   
 
                                                 
 
                                          
 
                                       
 
                                 
 
                                 
 
                          
 
                        
 
                     
 
                  
 
     68      04  04  00  20  10      CL  19  WR  Ac
 
     77      -1  -2      .0          IN  16  IG  ti
 
     40      3-  3-      00          IC  90  HT  ve
 
     12      20  20                              
 
     26      09  09                  PH      AR    
 
     0                               AR      DY    
 
                                     MA       C    
 
                                     CY            
 
                                                   
 
                                                  
 
                                          
 
                                       
 
                                 
 
                                 
 
                          
 
                        
 
                     
 
                  
 
                                                                                
                                                                                
                                                                                
                                                                                
                                                                                
                                                                                
                      
 
Vital Signs
                      2013 17:32            
 
 
 Name         Value        Interpretat  Reference   Comment    
 
                           ion          Range                   
 
                               
 
      
 
 Body   98.7 [degF]                                       
 
 Temperature                                                    
 
                                                            
 
                      
 
 BP   108 mm[Hg]                                        
 
 Diastolic                                                      
 
                                                           
 
                    
 
 BP Systolic  174 mm[Hg]                                        
 
                                                                
 
                                                       
 
              
 
 Heart   78 /min                                           
 
 Rate/Pulse                                                     
 
                                                        
 
                     
 
 O2%          98 %                                              
 
                                                               
 
                                        
 
              
 
 Respiratory  18 /min                                           
 
  Rate                                                          
 
                                                        
 
                
 
            2013 16:15            
 
 
 Name         Value        Interpretat  Reference   Comment    
 
                           ion          Range                   
 
                               
 
      
 
 BP   98 mm[Hg]                                         
 
 Diastolic                                                      
 
                                                          
 
                    
 
 BP Systolic  161 mm[Hg]                                        
 
                                                                
 
                                                       
 
              
 
 Heart   85 /min                                           
 
 Rate/Pulse                                                     
 
                                                        
 
                     
 
 O2%          95 %                                              
 
                                                               
 
                                        
 
              
 
 Respiratory  20 /min                                           
 
  Rate                                                          
 
                                                        
 
                
 
                                                                                
                           
 
Results
                      
 
 
 Labs                
 
 
 
 
 Lab   Lab   Date    Result  Refere  Interp  Status  Commen
 
 Order   Detail                  nces   retati          t     
 
                                 Range   on                    
 
                                                            
 
                                  
 
                
 
 
 
 
 COMPREHENSIVE METABOLIC PANEL (2013 16:11)
 
                 
 
 
 
 
          Glucose    100               complet
 
             013   mg/dL                      ed     
 
        Bld-mCn  16:11                                      
 
  c                                            
 
                                 
 
                 
 
          BUN     8 mg/dL  7-18              complet
 
          Bld-mCn  013                              ed     
 
        c        16:11                                      
 
                                          
 
                            
 
           
 
          Creat     0.9   0.6-1.0           complet
 
          SerPl-m  013   mg/dL                      ed     
 
        Cnc      16:11                                      
 
                                                
 
                                 
 
           
 
          GFR     68   59-               complet
 
          (ESTIMA  013   ML/MIN                     ed     
 
        EVELINE)     16:11                                      
 
                                           
 
                                  
 
             
 
          Sodium     139   136-145           complet
 
          SerPl-s  013   mmoL/L                     ed     
 
        Cnc      16:11                                      
 
                                                
 
                                  
 
           
 
          Potassi  -2  4.0   3.5-5.1           complet
 
          um   013   mmoL/L                     ed     
 
        SerPl-s  16:11                                      
 
  Cnc                                           
 
                                  
 
                   
 
          Chlorid  -2  105               complet
 
          e   013   mmoL/L                     ed     
 
        SerPl-s  16:11                                      
 
  Cnc                                          
 
                                  
 
                   
 
          CO2   03-2  29   21.0-32           complet
 
          SerPl-s  013   mmoL/L   .0                ed     
 
        Cnc      16:11                                      
 
                                                  
 
                                  
 
           
 
          Calcium  03-2  8.9   8.5-10.           complet
 
             013   mg/dL    1                 ed     
 
        SerPl-m  16:11                                      
 
  Cnc                                            
 
                                 
 
                   
 
          Prot   03-2  7.5   6.4-8.2           complet
 
          SerPl-m  013   gm/dL                      ed     
 
        Cnc      16:11                                      
 
                                                
 
                                 
 
           
 
          Albumin  03-2  3.5   3.4-5.0           complet
 
             013   gm/dL                      ed     
 
        SerPl-m  16:11                                      
 
  Cnc                                           
 
                                 
 
                   
 
          Globuli  -2  4.0   1.3-3.2           complet
 
          n   013   gm/dL                      ed     
 
        Ser-mCn  16:11                                      
 
  c                                             
 
                                 
 
                 
 
          Albumin  -2  0.9 UNK  1.1-1.8           complet
 
          /Glob   013                              ed     
 
        SerPl-m  16:11                                      
 
  Rto                                        
 
                              
 
                   
 
          Bilirub  -2  0.6   0.2-1.0           complet
 
             013   mg/dL                      ed     
 
        SerPl-m  16:11                                      
 
  Cnc                                           
 
                                 
 
                   
 
          AST   07-03-2  13 U/L   15-37             complet
 
          SerPl-c  013                              ed     
 
        Cnc      16:11                                      
 
                                          
 
                              
 
           
 
          ALT   07-03-2  33 U/L   30-65             complet
 
          SerPl-c  013                              ed     
 
        Cnc      16:11                                      
 
                                          
 
                              
 
           
 
          ALP   03-2  103 U/L              complet
 
          SerPl-c  013                              ed     
 
        Cnc      16:11                                      
 
                                            
 
                              
 
           
 
 
 
 
 CBC with AUTO DIFF (2013 16:11)
 
                 
 
 
 
 
          WBC #   07-03-2  6.4   4.8-10.           complet
 
          Bld   013   K/MM3    8                 ed     
 
        Auto     16:11                                      
 
                                                 
 
                                 
 
             
 
          RBC #   07-03-2  4.07   4.2-5.4           complet
 
          Bld   013   M/mm3                      ed     
 
        Auto     16:11                                      
 
                                                
 
                                 
 
             
 
          Hgb   -03-2  12.1   12.2-16           complet
 
          Bld-mCn  013   g/dL     .2                ed     
 
        c        16:11                                      
 
                                                  
 
                              
 
           
 
          Hct Fr   -2  37.2 %   37.0-47           complet
 
          Bld      013            .0                ed     
 
                 16:11                                      
 
                                              
 
                      
 
           
 
          MCV RBC  03-2  91.4 fl  82.2-97           complet
 
                   013            .8                ed     
 
                 16:11                                      
 
                                            
 
                      
 
           
 
          MCH RBC  -2  29.8 pg  27-31.2           complet
 
           Qn   013                              ed     
 
        Auto     16:11                                      
 
                                             
 
                              
 
             
 
          MEAN   -2  32.6   31.8-35           complet
 
          CORPUSC  013   g/dl     .4                ed     
 
        ULAR   16:11                                      
 
  HGB                                          
 
  CONC                          
 
                   
 
              
 
        
 
          RDW RBC  -2  14.5 %   11.5-17           complet
 
           Auto    013            .5                ed     
 
                 16:11                                      
 
                                              
 
                         
 
           
 
          Platele  -2  297   142-424           complet
 
          t Bld   013   K/mm3                      ed     
 
        Ql   16:11                                      
 
  Manual                                        
 
                                 
 
                   
 
          
 
          MEAN     7.6 fl   7.4-10.           complet
 
          PLATELE  013            4                 ed     
 
        T   16:11                                      
 
  VOLUME                                     
 
                              
 
                   
 
          
 
          Granulo  -03-2  71.8 %   37.0-80           complet
 
          cytes   013            .0                ed     
 
        Fr Bld   16:11                                      
 
  Auto                                        
 
                              
 
                   
 
        
 
          LYMPH %  -03-2  20.9 %   10-50.0           complet
 
                   013                              ed     
 
                 16:11                                      
 
                                         
 
                      
 
           
 
          Monocyt  03-2  5.7 %    1.7-9.3           complet
 
          es Fr   013                              ed     
 
        Bld   16:11                                      
 
  Auto                                     
 
                              
 
                   
 
        
 
          Eosinop  07-03-2  1.3 %    0.1-12.           complet
 
          hil Fr   013            0                 ed     
 
        Bld   16:11                                      
 
  Auto                                      
 
                              
 
                   
 
        
 
          Basophi  07-03-2  0.3 %    0.1-2.0           complet
 
          ls Fr   013                              ed     
 
        Bld   16:11                                      
 
  Auto                                     
 
                              
 
                   
 
        
 
          Granulo  07-03-2  4.6   1.8-7.8           complet
 
          cytes #  013   K/mm3                      ed     
 
         Bld   16:11                                      
 
  Auto                                          
 
                                 
 
                   
 
        
 
          Lymphoc  07-03-2  1.3   0.7-4.5           complet
 
          ytes Fr  013   K/mm3                      ed     
 
         Bld   16:11                                      
 
  Auto                                          
 
                                 
 
                   
 
        
 
          Monocyt  07-03-2  0.4   0.1-1.0           complet
 
          es #   013   K/mm3                      ed     
 
        Bld   16:11                                      
 
  Auto                                          
 
                                 
 
                   
 
        
 
          Eosinop  07-03-2  0.1   0.0-0.4           complet
 
          hil #   013   K/mm3                      ed     
 
        Bld   16:11                                      
 
  Auto                                          
 
                                 
 
                   
 
        
 
          Basophi  07-03-2  0.0   0-0.2             complet
 
          ls #   013   K/MM3                      ed     
 
        Bld   16:11                                      
 
  Auto                                        
 
                                 
 
                   
 
        
 
 
 
 
 B-HCG Ur Ql (2013 15:51)
 
                 
 
 
 
 
          B-HCG   -2  NEGATIV  NEG               complet
 
          Ur Ql    013   E                          ed     
 
                 15:51                                      
 
                                         
 
                         
 
           
 
 
 
 
 URINALYSIS/COMPLETE (2013 15:51)
 
                 
 
 
 
 
          URINE   -2  YELLOW   YELLOW            complet
 
          COLOR    013                              ed     
 
                 15:51                                      
 
                                           
 
                         
 
           
 
          URINE   07-03-2  CLEAR    CLEAR             complet
 
          APPEARA  013                              ed     
 
        NCE      15:51                                      
 
                                         
 
                              
 
           
 
          URINE   07-03-2  NEGATIV  NEG               complet
 
          GLUCOSE  013   E                          ed     
 
         -   15:51                                      
 
  DIPSTIC                                
 
  K                           
 
                   
 
            
 
          URINE   07-03-2  NEGATIV  NEG               complet
 
          BILIRUB  013   E                          ed     
 
        IN -   15:51                                      
 
  DIPSTIC                                
 
  K                           
 
                   
 
            
 
          URINE   07-03-2  NEGATIV  NEG               complet
 
          KETONE   013   E mg/dL                    ed     
 
                 15:51                                      
 
                                           
 
                               
 
           
 
          URINE   07-03-2  1.020   1.005-1           complet
 
          SPECIFI  013   UNK      .030              ed     
 
        C   15:51                                      
 
  GRAVITY                                          
 
                                
 
                   
 
           
 
          URINE   -03-2  TRACE-I  NEG               complet
 
          BLOOD    013   NTACT                      ed     
 
                 15:51                                      
 
                                           
 
                            
 
           
 
          URINE   07-03-2  6.0 UNK  5.0-8.5           complet
 
          PH       013                              ed     
 
                 15:51                                      
 
                                            
 
                      
 
           
 
          URINE   07-03-2  NEGATIV  NEG               complet
 
          PROTEIN  013   E mg/dL                    ed     
 
         -   15:51                                      
 
  DIPSTIC                                  
 
  K                                
 
                   
 
            
 
          URINE   07-03-2  0.2   NEG               complet
 
          UROBILI  013   E.U./dL                    ed     
 
        NOGEN -  15:51                                      
 
                                     
 
  DIPSTIC                          
 
  K                
 
              
 
            
 
          URINE   07-03-2  NEGATIV  NEG               complet
 
          NITRATE  013   E                          ed     
 
         -   15:51                                      
 
  DIPSTIC                                
 
  K                           
 
                   
 
            
 
          URINE   07-03-2  1+       NEG               complet
 
          LEUK   013                              ed     
 
        ESTERAS  15:51                                     
 
  E                                
 
                              
 
                 
 
          URINE   07-03-2  3-5   0                 complet
 
          RBC      013   rbc/hpf                    ed     
 
                 15:51                                    
 
                                           
 
                           
 
           
 
          URINE   07-03-2  5-10   O                 complet
 
          WBC      013   wbc/hpf                    ed     
 
                 15:51                                    
 
                                           
 
                           
 
           
 
          URINE   07-03-2  5-10   0-5               complet
 
          SQUAMOU  013   #/hpf                      ed     
 
        S CELLS  15:51                                      
 
                                           
 
                                 
 
                
 
          URINE   07-03-2  2+       O                 complet
 
          BACTERI  013                              ed     
 
        A        15:51                                   
 
                                   
 
                            
 
           
 
                                                                                
                                                                                
                                                                                
                                                                                
                                                                                
                                                                                
                                                                                
                                                                       
 
Procedures
                      
 
 
 Procedure  DOS        Code       Location   Performer  Comment  
 
                                                                 
 
                                                 
 
 INCISION     34896      [facility]   [facility]             
 
 &                        PHYSICIAN                     
 
 DRAINAGE                     [facility]            
 
 ABSCESS                  
 
 COMPLICAT             
 
 ED/MULTIP     
 
 LE            
 
               
 
        
 
 IV     38543      [facility]            
 
 INFUSION   5                     [facility]           
 
 THERAPY/P                    INC        INC       
 
 ROPHYLAXI                           
 
 S /DX 1ST             
 
  TO 1 HR      
 
               
 
              
 
 HEPATITIS    36538      [facility]            
 
  C   5                     [facility]           
 
 ANTIBODY                     INC        INC       
 
                                     
 
                      
 
 CULTURE     86405      [facility]            
 
 BACTERIAL  5                     [facility]           
 
  BLOOD                     INC        INC       
 
 AEROBIC                            
 
 W/ID              
 
 ISOLATES      
 
               
 
              
 
 BLOOD     36381      [facility]            
 
 COUNT   5                     [facility]           
 
 COMPLETE                     INC        INC       
 
 AUTO&AUTO                           
 
  DIFRNTL              
 
 WBC           
 
               
 
         
 
 COMPREHEN    80466      [facility]            
 
 SIVE   5                     [facility]           
 
 METABOLIC                    INC        INC       
 
  PANEL                              
 
                       
 
            
 
 HEPATITIS    71133      [facility]            
 
  A   5                     [facility]           
 
 ANTIBODY                     INC        INC       
 
 HAAB                                
 
                       
 
          
 
 [place]     [address]      [facility]            
 
 LACTATE    5                     [facility]           
 
                              INC        INC       
 
                                   
 
             
 
 HEPATITIS    08637      [facility]            
 
  B CORE   5                     [facility]           
 
 ANTIBODY                     INC        INC       
 
 HBCAB                            
 
 TOTAL                 
 
               
 
           
 
 HEPATITIS    32033      [facility]ENDRE[facility] SURF   5                     [facility]           
 
 ANTIBODY                     INC        INC       
 
 HBSAB                               
 
                       
 
           
 
 [place]     [address]      [facility]            
 
 HEPATITIS  5                     [facility]           
 
  B                     INC        INC       
 
 SURFACE                            
 
 ANTIGEN               
 
               
 
             
 
 [place]   10-  [address]      [facility]            
 
 THYROXINE  5                     [facility]           
 
  TOTAL                       INC        INC       
 
                                     
 
                    
 
 COMPREHEN  10-  71078      [facility]            
 
 SIVE   5                     [facility]           
 
 METABOLIC                    INC        INC       
 
  PANEL                              
 
                       
 
            
 
 [place]   10-  [address]      [facility]            
 
 THYROID   5                     [facility]           
 
 STIMULATI                    INC        INC       
 
 NG                            
 
 HORMONE              
 
 TSH           
 
               
 
         
 
 COLLECTIO  10-  95867      [facility]            
 
 N VENOUS   5                     [facility]           
 
 BLOOD                     INC        INC       
 
 VENIPUNCT                           
 
 URE                   
 
               
 
         
 
 HEMOGLOBI  10-  83893      [facility]            
 
 N   5                     [facility]           
 
 GLYCOSYLA                    INC        INC       
 
 EVELINE A1C                             
 
                       
 
             
 
 BLOOD   10-  79248      [facility]            
 
 COUNT   5                     [facility]           
 
 COMPLETE                     INC        INC       
 
 AUTO&AUTO                           
 
  DIFRNTL              
 
 WBC           
 
               
 
         
 
 HEPATITIS    06918      [facility]            
 
  C   5                     [facility]           
 
 ANTIBODY                     INC        INC       
 
                                     
 
                      
 
 BLOOD     17238      [facility]            
 
 COUNT   5                     [facility]           
 
 COMPLETE                     INC        INC       
 
 AUTO&AUTO                           
 
  DIFRNTL              
 
 WBC           
 
               
 
         
 
 COMPREHEN    30828      [facility]            
 
 SIVE   5                     [facility]           
 
 METABOLIC                    INC        INC       
 
  PANEL                              
 
                       
 
            
 
 COLLECTIO    89081      [facility]            
 
 N VENOUS   5                     [facility]           
 
 BLOOD                     INC        INC       
 
 VENIPUNCT                           
 
 URE                   
 
               
 
         
 
 INF AGT           [facility]            
 
 AB DETECT  5                     [facility]           
 
  EIA TECH                    INC        INC       
 
                             
 
 HIV-1&/HI             
 
 V-2 SCR       
 
               
 
             
 
 HEPATITIS    40865      [facility]            
 
  A   5                     [facility]           
 
 ANTIBODY                     INC        INC       
 
 HAAB                                
 
                       
 
          
 
 [place]     [address]      [facility]            
 
 THYROID   5                     [facility]           
 
 STIMULATI                    INC        INC       
 
 NG                            
 
 HORMONE              
 
 TSH           
 
               
 
         
 
 [place]     [address]      [facility]            
 
 AMMONIA    5                     [facility]           
 
                              INC        INC       
 
                                   
 
             
 
 THROMBOPL    29378      [facility]            
 
 ASTIN   5                     [facility]           
 
 TIME                     INC        INC       
 
 PARTIAL                            
 
 PLASMA/WH             
 
 OLE BLOOD     
 
               
 
               
 
 HEPATITIS    07551      [facility] CORE   5                     [facility]           
 
 ANTIBODY                     INC        INC       
 
 HBCAB                            
 
 TOTAL                 
 
               
 
           
 
 PROTHROMB    43841      [facility]            
 
 IN TIME    5                     [facility]           
 
                              INC        INC       
 
                                   
 
             
 
 [place]     [address]      [facility]            
 
 HEPATITIS  5                     [facility]           
 
  B                     INC        INC       
 
 SURFACE                            
 
 ANTIGEN               
 
               
 
             
 
 HEPATITIS    45163      [facility] SURF   5                     [facility]           
 
 ANTIBODY                     INC        INC       
 
 HBSAB                               
 
                       
 
           
 
 [place]     [address]      [facility]            
 
 THYROXINE  5                     [facility]           
 
  TOTAL                       INC        INC       
 
                                     
 
                    
 
 RADIOLOGI    61472      [facility] ELLIS ALL            
 
 C EXAM   5                     MEDICAL                      
 
 CHEST 2                     IMAGING            
 
 VIEWS       ASS        
 
 FRONTAL&L               
 
 ATERAL            
 
               
 
            
 
 CT     [address]      [place]   BOND ALL            
 
 HEAD/BRAI  5                     MEDICAL                      
 
 N W/O                     IMAGING            
 
 CONTRAST       ASS        
 
 MATERIAL                
 
                   
 
              
 
 RADEX     89926      [facility]            
 
 SPINE   5                     MEDICAL                      
 
 LUMBOSACR                    IMAGING            
 
 AL 2/3       ASS        
 
 VIEWS                   
 
                   
 
           
 
 INITIAL     04782      RENEE SADI RENEE SADI            
 
 INPATIENT  4                                                   
 
  CONSULT                                        
 
 NEW/ESTAB       
 
  PT 20      
 
 MIN           
 
               
 
         
 
 [place]     [address]      [facility]JESH   RAJESH            
 
 ABDOMEN &  4                     PAMELA        PAMELA                
 
  PELVIS                                          
 
 W/O                
 
 CONTRAST      
 
 MATERIAL      
 
               
 
              
 
 OPHTH     02695      FISH ARNOLD,            
 
 MEDICAL   0                     VISION     GENA M           
 
 XM&EVAL                                          
 
 COMPRHNSV                       
 
  ESTAB PT     
 
  1/>          
 
               
 
          
 
 TENS           EMPI INC   EMPI INC            
 
 DEVICE   0                                                   
 
 4/MORE                                        
 
 LEADS        
 
 MULTI      
 
 NERVE      
 
 STIMULATI     
 
 ON            
 
               
 
        
 
 APPLICATI    63334      [facility]            
 
 ON   0                     [facility]           
 
 MODALITY                     INC        INC       
 
 1/> AREAS                           
 
  HOT/COLD             
 
  PACKS        
 
               
 
            
 
 APPL     21752      [facility]            
 
 MODALITY   0                     [facility]           
 
 1/> AREAS                    INC        INC       
 
  ELEC                            
 
 STIMJ              
 
 UNATTENDE     
 
 D             
 
               
 
       
 
 THERAPEUT    04261      [facility]            
 
 IC PX 1/>  0                     [facility]           
 
  AREAS                     INC        INC       
 
 EACH 15                            
 
 MIN              
 
 EXERCISES     
 
               
 
               
 
 APPL     42337      [facility]            
 
 MODALITY   0                     [facility]           
 
 1/> AREAS                    INC        INC       
 
  ELEC                            
 
 STIMJ EA              
 
 15 MIN        
 
               
 
            
 
 APPL     47696      [facility]            
 
 MODALITY   0                     [facility]           
 
 1/> AREAS                    INC        INC       
 
  ELEC                            
 
 STIMJ              
 
 UNATTENDE     
 
 D             
 
               
 
       
 
 THERAPEUT    17249      [facility]            
 
 IC PX 1/>  0                     [facility]           
 
  AREAS                     INC        INC       
 
 EACH 15                            
 
 MIN              
 
 EXERCISES     
 
               
 
               
 
 APPLICATI    10372      [facility]            
 
 ON   0                     [facility]           
 
 MODALITY                     INC        INC       
 
 1/> AREAS                           
 
  HOT/COLD             
 
  PACKS        
 
               
 
            
 
 APPLICATI    90278      [facility]            
 
 ON   0                     [facility]           
 
 MODALITY                     INC        INC       
 
 1/> AREAS                           
 
  HOT/COLD             
 
  PACKS        
 
               
 
            
 
 THERAPEUT    05617      [facility]            
 
 IC PX 1/>  0                     [facility]           
 
  AREAS                     INC        INC       
 
 EACH 15                            
 
 MIN              
 
 EXERCISES     
 
               
 
               
 
 PHYSICAL     17780      [facility]            
 
 THERAPY   0                     [facility]           
 
 EVALUATIO                    INC        INC       
 
 N                                   
 
                       
 
       
 
 APPL     89202      [facility]            
 
 MODALITY   0                     [facility]           
 
 1/> AREAS                    INC        INC       
 
  ELEC                            
 
 STIMJ              
 
 UNATTENDE     
 
 D             
 
               
 
       
 
 PSYCHOLOG    17656      PHYSICIAN  [facility]IAM,            
 
 ICAL   0                     S   [facility]          
 
 TESTING                     SERVICES             
 
 ADMN BY       [facility]                  
 
 TECH HI                
 
 HR                
 
               
 
        
 
 RADEX     99023      [place]   NETO            
 
 SPINE   0                     MEDICAL   SHELLY P           
 
 LUMBOSACR                    IMAGING             
 
 AL       ASSOCIATE           
 
 MINIMUM 4    S          
 
  VIEWS                  
 
                 
 
            
 
 LEVEL III  10-  43913      ORAL   ORAL            
 
  SURG   9                     PATHOLOGY  PATHOLOGY          
 
 PATHOLOGY                         
 
        LABORATOR  LABORATOR 
 
 GROSS&[facility]    Y          Y         
 
 ROSCOPIC                          
 
 EXAM              
 
               
 
          
 
 THER     70636      SEBAS MULLEN           
 
 PROPH/DX   9                     DANIEL ROBERT           
 
 NJX IV                     W          W         
 
 PUSH                            
 
 SINGLE/1S         
 
 T      
 
 SBST/DRUG     
 
               
 
               
 
 BIOPSY OF    43302      SEBAS MULLEN           
 
  LIP       9                     DANIEL ROBERT W W         
 
                                
 
         
 
 ORTHOPANT    50907      SEBAS MULLEN           
 
 OGRAM      9                     DANIEL ROBERT W          W         
 
                                 
 
         
 
 DEEP           SEBAS MULLEN           
 
 SEDATION/  DANIEL Ivy ROBERT           
 
 GENERAL                     W          W         
 
 ANESTHESI                           
 
 A-1ST 30          
 
 MINUTES       
 
               
 
             
 
 3D     18261      REYNA C  RAJESH,           
 
 RENDERING  9                      RAJESH   REYNA           
 
  W/INTERP                                         
 
  &                           
 
 POSTPROCE     
 
 SS      
 
 SUPERVISI     
 
 ON            
 
               
 
        
 
 MRI     06750      REYNA C  RAJESH,           
 
 SPINAL   9                      RAJESH   REYNA           
 
 CANAL                                          
 
 LUMBAR                           
 
 W/O      
 
 CONTRAST      
 
 MATERIAL      
 
               
 
              
 
 RADEX     87281      [facility]            
 
 SPINE   9                     [facility]           
 
 LUMBOSACR                    INC        INC       
 
 AL                            
 
 MINIMUM 4             
 
  VIEWS        
 
               
 
            
 
                                                                                
                                                                                
                                                                                
                                                                                
                                                                                
                                                                                
                                                                                
                                                                                
                              
 
Encounters
                      
 
 
 Encounter  Start   End Date   Code       Location   Performer
 
  Type      Date                                                 
 
                                                        
 
                
 
 EMERGENCY    88134      [facility]  
 
    7          7                     PHYSICIAN           
 
 DEPARTMEN                               [facility]         
 
 T VISIT                    
 
 HIGH/URGE             
 
 NT      
 
 SEVERITY      
 
               
 
              
 
 EMERGENCY    73016      [facility] ROMERO 
 
    5          5                     PHYSICIAN  [facility]      
 
 DEPARTMEN                               [facility]             
 
 T VISIT                      
 
 HIGH/URGE             
 
 NT      
 
 SEVERITY      
 
               
 
              
 
 [facility]                [facility]            
 
 -   5          5                     [facility]            
 
 OUTPATIEN                                   INC                 
 
 T                                             
 
                   
 
       
 
 EMERGENCY    27332      [facility]            
 
    5          5                     [facility]            
 
 DEPARTMEN                               INC                 
 
 T VISIT                            
 
 MODERATE          
 
 SEVERITY      
 
               
 
              
 
 HOSPITAL   10-  10-             [facility]            
 
 -   5          5                     [facility]            
 
 OUTPATIEN                                   INC                 
 
 T                                             
 
                   
 
       
 
 OFFICE     45762      [facility]   HEATHER 
 
 OUTPATIEN  5          5                     PHYSICIAN  [facility]      
 
 T VISIT                                S GROUP             
 
 15                      
 
 MINUTES               
 
               
 
             
 
 HOSPITAL                [facility]            
 
 -   5          5                     [facility]            
 
 OUTPATIEN                                   INC                 
 
 T                                             
 
                   
 
       
 
 Emergency    MOHINI Soliz MD
 
 (ER)       3 15:50    3 17:45               [facility]            
 
                                                
 
                  
 
 OFFICE     84262      JOYCE COOK OUTPATIEN  0          0                     VINICIO COOPER
 
 T VISIT                                                    
 
 15                            
 
 MINUTES       
 
               
 
             
 
 OFFICE     46075      JOYCE COOK OUTPATIEN  0          0                     VINICIO COOPER
 
 T VISIT                                                    
 
 15                            
 
 MINUTES       
 
               
 
             
 
 OFFICE     56484      PHYSICIAN  VISHNU CRUZ  0          0                     S   [facility]   
 
 T VISIT                                SERVICES            
 
 25          PSC            
 
 MINUTES                 
 
                   
 
             
 
 OFFICE     05192      JOYCE COOK OUTPATIEN  0          0                     VINICIO COOPER
 
 T VISIT                                                    
 
 15                            
 
 MINUTES       
 
               
 
             
 
 HOSPITAL                [facility]            
 
 -   0          0                     [facility]            
 
 OUTPATIEN                                   INC                 
 
 T                                             
 
                   
 
       
 
 OFFICE     38388      PHYSICIAN  VISHNU THOMAS  0          0                     S   [facility]                                SERVICES            
 
 MINUTES           PSC               
 
                         
 
                 
 
 EMERGENCY    90047      [facility]            
 
    0          0                     [facility]            
 
 DEPARTMEN                               INC                 
 
 T VISIT                            
 
 LOW/MODER         
 
  SEVERITY     
 
               
 
               
 
 HOSPITAL                [facility]            
 
 -   0          0                     [facility]            
 
 OUTPATIEN                                   INC                 
 
 T                                             
 
                   
 
       
 
 EMERGENCY    71021      MERLE ROMERO, 
 
    0          0                     EMERGENCY  [facility]
 
 [facility]                                [facility]           
 
 T VISIT                    
 
 HIGH/URGE     ASSOCIATE 
 
 NT      S         
 
 SEVERITY                
 
                 
 
              
 
 OFFICE   10-  10-  55623      JOYCE COOK OUTPATIEN  9          9                     [facility] [employer]
 
 T NEW 30                                                    
 
 MINUTES                             
 
               
 
             
 
 OFFICE     [id number]      SRI CORDERO, 
 
 CONSULTAT  9          9                     MEDICAL   [facility]
 
 ION                                SERV            
 
 NEW/ESTAB         FOUNDATIO         
 
  PATIENT                
 
 40 MIN                  
 
               
 
            
 
 OFFICE     [id number]      ASA REYES  9          9                     [facility] VISIT                                 [facility]                
 
 15                    
 
 MINUTES            
 
               
 
             
 
 OFFICE     [id number]      ASA REYES  9          9                     [facility] VISIT                                 [facility]                
 
 15                    
 
 MINUTES            
 
               
 
             
 
 H[facility]                [facility]            
 
 -   9          9                     [facility]            
 
 OUTPATIEN                                   INC                 
 
 T                                             
 
                   
 
       
 
 OFFICE     [id number]      ASA REYES  9          9                     [facility] VISIT                                 [facility]                
 
 15                    
 
 MINUTES            
 
               
 
             
 
 OFFICE     [id number]      ASA REYES  9          9                     [facility] VISIT                                 [facility]                
 
 15                    
 
 MINUTES            
 
               
 
             
 
 HOSPITAL                [facility]            
 
 -   9          9                     [facility]            
 
 OUTPATIEN                                   INC                 
 
 T

## 2019-04-14 ENCOUNTER — APPOINTMENT (OUTPATIENT)
Dept: GENERAL RADIOLOGY | Facility: HOSPITAL | Age: 49
End: 2019-04-14

## 2019-04-14 ENCOUNTER — HOSPITAL ENCOUNTER (EMERGENCY)
Facility: HOSPITAL | Age: 49
Discharge: HOME | End: 2019-04-14
Attending: EMERGENCY MEDICINE | Admitting: EMERGENCY MEDICINE

## 2019-04-14 VITALS
BODY MASS INDEX: 31.5 KG/M2 | HEART RATE: 71 BPM | SYSTOLIC BLOOD PRESSURE: 149 MMHG | TEMPERATURE: 98.4 F | DIASTOLIC BLOOD PRESSURE: 84 MMHG | HEIGHT: 70 IN | RESPIRATION RATE: 18 BRPM | OXYGEN SATURATION: 98 % | WEIGHT: 220 LBS

## 2019-04-14 DIAGNOSIS — F11.23 OPIOID DEPENDENCE WITH WITHDRAWAL (HCC): Primary | ICD-10-CM

## 2019-04-14 DIAGNOSIS — F11.93 OPIATE WITHDRAWAL (HCC): ICD-10-CM

## 2019-04-14 LAB
ALBUMIN SERPL-MCNC: 3.8 G/DL (ref 3.5–5.2)
ALBUMIN/GLOB SERPL: 0.7 G/DL
ALP SERPL-CCNC: 200 U/L (ref 39–117)
ALT SERPL W P-5'-P-CCNC: 222 U/L (ref 1–33)
AMPHET+METHAMPHET UR QL: NEGATIVE
AMPHETAMINES UR QL: NEGATIVE
ANION GAP SERPL CALCULATED.3IONS-SCNC: 11 MMOL/L
AST SERPL-CCNC: 180 U/L (ref 1–32)
B-HCG UR QL: NEGATIVE
BACTERIA UR QL AUTO: ABNORMAL /HPF
BARBITURATES UR QL SCN: NEGATIVE
BASOPHILS # BLD AUTO: 0.02 10*3/MM3 (ref 0–0.2)
BASOPHILS NFR BLD AUTO: 0.2 % (ref 0–1.5)
BENZODIAZ UR QL SCN: POSITIVE
BILIRUB SERPL-MCNC: 0.5 MG/DL (ref 0.2–1.2)
BILIRUB UR QL STRIP: ABNORMAL
BUN BLD-MCNC: 12 MG/DL (ref 6–20)
BUN/CREAT SERPL: 15.8 (ref 7–25)
BUPRENORPHINE SERPL-MCNC: NEGATIVE NG/ML
CALCIUM SPEC-SCNC: 9.5 MG/DL (ref 8.6–10.5)
CANNABINOIDS SERPL QL: POSITIVE
CHLORIDE SERPL-SCNC: 97 MMOL/L (ref 98–107)
CLARITY UR: CLEAR
CO2 SERPL-SCNC: 25 MMOL/L (ref 22–29)
COCAINE UR QL: NEGATIVE
COLOR UR: ABNORMAL
CREAT BLD-MCNC: 0.76 MG/DL (ref 0.57–1)
DEPRECATED RDW RBC AUTO: 46.3 FL (ref 37–54)
EOSINOPHIL # BLD AUTO: 0.14 10*3/MM3 (ref 0–0.4)
EOSINOPHIL NFR BLD AUTO: 1.6 % (ref 0.3–6.2)
ERYTHROCYTE [DISTWIDTH] IN BLOOD BY AUTOMATED COUNT: 13.8 % (ref 12.3–15.4)
ETHANOL BLD-MCNC: <10 MG/DL (ref 0–10)
GFR SERPL CREATININE-BSD FRML MDRD: 81 ML/MIN/1.73
GLOBULIN UR ELPH-MCNC: 5.5 GM/DL
GLUCOSE BLD-MCNC: 98 MG/DL (ref 65–99)
GLUCOSE UR STRIP-MCNC: NEGATIVE MG/DL
HCT VFR BLD AUTO: 38.5 % (ref 34–46.6)
HGB BLD-MCNC: 13.2 G/DL (ref 12–15.9)
HGB UR QL STRIP.AUTO: NEGATIVE
HOLD SPECIMEN: NORMAL
HOLD SPECIMEN: NORMAL
HYALINE CASTS UR QL AUTO: ABNORMAL /LPF
IMM GRANULOCYTES # BLD AUTO: 0.02 10*3/MM3 (ref 0–0.05)
IMM GRANULOCYTES NFR BLD AUTO: 0.2 % (ref 0–0.5)
KETONES UR QL STRIP: ABNORMAL
LEUKOCYTE ESTERASE UR QL STRIP.AUTO: ABNORMAL
LYMPHOCYTES # BLD AUTO: 2.52 10*3/MM3 (ref 0.7–3.1)
LYMPHOCYTES NFR BLD AUTO: 28.4 % (ref 19.6–45.3)
MCH RBC QN AUTO: 31.4 PG (ref 26.6–33)
MCHC RBC AUTO-ENTMCNC: 34.3 G/DL (ref 31.5–35.7)
MCV RBC AUTO: 91.7 FL (ref 79–97)
METHADONE UR QL SCN: NEGATIVE
MONOCYTES # BLD AUTO: 0.59 10*3/MM3 (ref 0.1–0.9)
MONOCYTES NFR BLD AUTO: 6.7 % (ref 5–12)
NEUTROPHILS # BLD AUTO: 5.57 10*3/MM3 (ref 1.4–7)
NEUTROPHILS NFR BLD AUTO: 62.9 % (ref 42.7–76)
NITRITE UR QL STRIP: NEGATIVE
OPIATES UR QL: POSITIVE
OXYCODONE UR QL SCN: NEGATIVE
PCP UR QL SCN: NEGATIVE
PH UR STRIP.AUTO: 6 [PH] (ref 5–8)
PLATELET # BLD AUTO: 228 10*3/MM3 (ref 140–450)
PMV BLD AUTO: 9.3 FL (ref 6–12)
POTASSIUM BLD-SCNC: 4.1 MMOL/L (ref 3.5–5.2)
PROPOXYPH UR QL: NEGATIVE
PROT SERPL-MCNC: 9.3 G/DL (ref 6–8.5)
PROT UR QL STRIP: NEGATIVE
RBC # BLD AUTO: 4.2 10*6/MM3 (ref 3.77–5.28)
RBC # UR: ABNORMAL /HPF
REF LAB TEST METHOD: ABNORMAL
SODIUM BLD-SCNC: 133 MMOL/L (ref 136–145)
SP GR UR STRIP: 1.02 (ref 1–1.03)
SQUAMOUS #/AREA URNS HPF: ABNORMAL /HPF
TRICYCLICS UR QL SCN: NEGATIVE
UROBILINOGEN UR QL STRIP: ABNORMAL
WBC NRBC COR # BLD: 8.86 10*3/MM3 (ref 3.4–10.8)
WBC UR QL AUTO: ABNORMAL /HPF
WHOLE BLOOD HOLD SPECIMEN: NORMAL
WHOLE BLOOD HOLD SPECIMEN: NORMAL

## 2019-04-14 PROCEDURE — 99284 EMERGENCY DEPT VISIT MOD MDM: CPT

## 2019-04-14 PROCEDURE — 93005 ELECTROCARDIOGRAM TRACING: CPT | Performed by: PHYSICIAN ASSISTANT

## 2019-04-14 PROCEDURE — 81025 URINE PREGNANCY TEST: CPT | Performed by: EMERGENCY MEDICINE

## 2019-04-14 PROCEDURE — 71046 X-RAY EXAM CHEST 2 VIEWS: CPT

## 2019-04-14 PROCEDURE — 85025 COMPLETE CBC W/AUTO DIFF WBC: CPT | Performed by: PHYSICIAN ASSISTANT

## 2019-04-14 PROCEDURE — 80307 DRUG TEST PRSMV CHEM ANLYZR: CPT | Performed by: EMERGENCY MEDICINE

## 2019-04-14 PROCEDURE — 80306 DRUG TEST PRSMV INSTRMNT: CPT | Performed by: EMERGENCY MEDICINE

## 2019-04-14 PROCEDURE — 80053 COMPREHEN METABOLIC PANEL: CPT | Performed by: PHYSICIAN ASSISTANT

## 2019-04-14 PROCEDURE — 81001 URINALYSIS AUTO W/SCOPE: CPT | Performed by: PHYSICIAN ASSISTANT

## 2019-04-14 RX ORDER — CLONIDINE 0.1 MG/24H
1 PATCH, EXTENDED RELEASE TRANSDERMAL WEEKLY
Status: DISCONTINUED | OUTPATIENT
Start: 2019-04-14 | End: 2019-04-14 | Stop reason: HOSPADM

## 2019-04-14 RX ORDER — ONDANSETRON 4 MG/1
4 TABLET, ORALLY DISINTEGRATING ORAL ONCE
Status: COMPLETED | OUTPATIENT
Start: 2019-04-14 | End: 2019-04-14

## 2019-04-14 RX ORDER — ONDANSETRON 4 MG/1
4 TABLET, FILM COATED ORAL EVERY 8 HOURS PRN
Qty: 20 TABLET | Refills: 0 | Status: SHIPPED | OUTPATIENT
Start: 2019-04-14

## 2019-04-14 RX ADMIN — CLONIDINE 1 PATCH: 0.1 PATCH TRANSDERMAL at 15:45

## 2019-04-14 RX ADMIN — ONDANSETRON 4 MG: 4 TABLET, ORALLY DISINTEGRATING ORAL at 15:18

## 2021-07-09 ENCOUNTER — HOSPITAL ENCOUNTER (OUTPATIENT)
Age: 51
End: 2021-07-09
Payer: MEDICAID

## 2021-07-09 DIAGNOSIS — Z79.899: Primary | ICD-10-CM

## 2021-07-09 PROCEDURE — 80305 DRUG TEST PRSMV DIR OPT OBS: CPT
